# Patient Record
Sex: MALE | Race: WHITE | URBAN - METROPOLITAN AREA
[De-identification: names, ages, dates, MRNs, and addresses within clinical notes are randomized per-mention and may not be internally consistent; named-entity substitution may affect disease eponyms.]

---

## 2023-04-26 ENCOUNTER — OFFICE (OUTPATIENT)
Dept: URBAN - METROPOLITAN AREA CLINIC 112 | Facility: CLINIC | Age: 83
Setting detail: OPHTHALMOLOGY
End: 2023-04-26
Payer: MEDICARE

## 2023-04-26 DIAGNOSIS — H35.3132: ICD-10-CM

## 2023-04-26 DIAGNOSIS — Z96.1: ICD-10-CM

## 2023-04-26 DIAGNOSIS — H26.493: ICD-10-CM

## 2023-04-26 PROCEDURE — 92014 COMPRE OPH EXAM EST PT 1/>: CPT | Performed by: OPHTHALMOLOGY

## 2023-04-26 PROCEDURE — 92134 CPTRZ OPH DX IMG PST SGM RTA: CPT | Performed by: OPHTHALMOLOGY

## 2023-04-26 ASSESSMENT — REFRACTION_AUTOREFRACTION
OS_SPHERE: UTP
OD_AXIS: 086
OD_CYLINDER: -1.50
OD_SPHERE: +0.25

## 2023-04-26 ASSESSMENT — KERATOMETRY
OS_K1POWER_DIOPTERS: 43.75
OD_AXISANGLE_DEGREES: 121
OD_K1POWER_DIOPTERS: 44.25
METHOD_AUTO_MANUAL: AUTO
OS_K2POWER_DIOPTERS: 44.75
OS_AXISANGLE_DEGREES: 160
OD_K2POWER_DIOPTERS: 45.00

## 2023-04-26 ASSESSMENT — REFRACTION_CURRENTRX
OD_AXIS: 41
OS_OVR_VA: 20/
OD_CYLINDER: -0.25
OD_SPHERE: +2.25
OD_SPHERE: +4.25
OS_SPHERE: +4.25
OS_SPHERE: +2.25
OS_OVR_VA: 20/
OD_OVR_VA: 20/
OD_OVR_VA: 20/

## 2023-04-26 ASSESSMENT — VISUAL ACUITY
OD_BCVA: 20/150
OS_BCVA: 20/40

## 2023-04-26 ASSESSMENT — REFRACTION_MANIFEST
OD_ADD: +2.75
OS_SPHERE: -0.50
OS_AXIS: 80
OD_AXIS: 80
OD_SPHERE: PL
OS_VA1: 20/20
OS_CYLINDER: -0.50
OD_VA1: 20/20
OS_ADD: +2.75
OD_CYLINDER: -0.50

## 2023-04-26 ASSESSMENT — TONOMETRY
OS_IOP_MMHG: 16
OD_IOP_MMHG: 21

## 2023-04-26 ASSESSMENT — CONFRONTATIONAL VISUAL FIELD TEST (CVF)
OS_FINDINGS: FULL
OD_FINDINGS: FULL

## 2023-04-26 ASSESSMENT — SPHEQUIV_DERIVED
OS_SPHEQUIV: -0.75
OD_SPHEQUIV: -0.5

## 2023-04-26 ASSESSMENT — AXIALLENGTH_DERIVED
OD_AL: 23.3755
OS_AL: 23.6086

## 2023-05-15 ENCOUNTER — ASC (OUTPATIENT)
Dept: URBAN - METROPOLITAN AREA SURGERY 8 | Facility: SURGERY | Age: 83
Setting detail: OPHTHALMOLOGY
End: 2023-05-15
Payer: MEDICARE

## 2023-05-15 DIAGNOSIS — H26.492: ICD-10-CM

## 2023-05-15 PROBLEM — H26.491 PSC, OBSCURING VISION; RIGHT EYE, LEFT EYE: Status: ACTIVE | Noted: 2023-05-15

## 2023-05-15 PROCEDURE — 66821 AFTER CATARACT LASER SURGERY: CPT | Performed by: OPHTHALMOLOGY

## 2023-05-15 ASSESSMENT — REFRACTION_MANIFEST
OS_ADD: +2.75
OS_SPHERE: -0.50
OS_CYLINDER: -0.50
OD_AXIS: 80
OD_ADD: +2.75
OD_SPHERE: PL
OS_AXIS: 80
OD_VA1: 20/20
OD_CYLINDER: -0.50
OS_VA1: 20/20

## 2023-05-15 ASSESSMENT — KERATOMETRY
OS_K2POWER_DIOPTERS: 44.75
OD_K2POWER_DIOPTERS: 45.00
OD_K1POWER_DIOPTERS: 44.25
METHOD_AUTO_MANUAL: AUTO
OS_K1POWER_DIOPTERS: 43.75
OS_AXISANGLE_DEGREES: 160
OD_AXISANGLE_DEGREES: 121

## 2023-05-15 ASSESSMENT — REFRACTION_CURRENTRX
OD_CYLINDER: -0.25
OD_SPHERE: +2.25
OD_OVR_VA: 20/
OD_SPHERE: +4.25
OD_OVR_VA: 20/
OS_OVR_VA: 20/
OS_OVR_VA: 20/
OD_AXIS: 41
OS_SPHERE: +2.25
OS_SPHERE: +4.25

## 2023-05-15 ASSESSMENT — AXIALLENGTH_DERIVED
OD_AL: 23.3755
OS_AL: 23.6086

## 2023-05-15 ASSESSMENT — REFRACTION_AUTOREFRACTION
OD_CYLINDER: -1.50
OD_AXIS: 086
OD_SPHERE: +0.25
OS_SPHERE: UTP

## 2023-05-15 ASSESSMENT — SPHEQUIV_DERIVED
OD_SPHEQUIV: -0.5
OS_SPHEQUIV: -0.75

## 2023-05-15 ASSESSMENT — VISUAL ACUITY
OS_BCVA: 20/40
OD_BCVA: 20/150

## 2023-06-19 ENCOUNTER — OFFICE (OUTPATIENT)
Dept: URBAN - METROPOLITAN AREA CLINIC 94 | Facility: CLINIC | Age: 83
Setting detail: OPHTHALMOLOGY
End: 2023-06-19
Payer: MEDICARE

## 2023-06-19 DIAGNOSIS — Z96.1: ICD-10-CM

## 2023-06-19 DIAGNOSIS — H35.3132: ICD-10-CM

## 2023-06-19 DIAGNOSIS — H26.491: ICD-10-CM

## 2023-06-19 DIAGNOSIS — H26.492: ICD-10-CM

## 2023-06-19 PROCEDURE — 99024 POSTOP FOLLOW-UP VISIT: CPT | Performed by: OPHTHALMOLOGY

## 2023-06-19 ASSESSMENT — REFRACTION_CURRENTRX
OD_SPHERE: +4.25
OD_OVR_VA: 20/
OS_OVR_VA: 20/
OS_SPHERE: +4.25
OS_SPHERE: +2.25
OS_OVR_VA: 20/
OD_SPHERE: +2.25
OD_CYLINDER: -0.25
OD_AXIS: 41
OD_OVR_VA: 20/

## 2023-06-19 ASSESSMENT — KERATOMETRY
OS_K1POWER_DIOPTERS: 44.00
OS_K2POWER_DIOPTERS: 44.75
OD_K1POWER_DIOPTERS: 44.50
OD_AXISANGLE_DEGREES: 162
OD_K2POWER_DIOPTERS: 45.00
OS_AXISANGLE_DEGREES: 005
METHOD_AUTO_MANUAL: AUTO

## 2023-06-19 ASSESSMENT — AXIALLENGTH_DERIVED
OS_AL: 23.6114
OS_AL: 23.5143

## 2023-06-19 ASSESSMENT — REFRACTION_MANIFEST
OS_VA1: 20/50-
OD_VA1: 20/40-
OS_CYLINDER: -0.75
OD_ADD: +3.25
OS_ADD: +3.25
OD_AXIS: 90
OS_SPHERE: -0.25
OD_CYLINDER: -0.75
OS_AXIS: 85
OD_SPHERE: PL

## 2023-06-19 ASSESSMENT — TONOMETRY
OD_IOP_MMHG: 16
OS_IOP_MMHG: 13

## 2023-06-19 ASSESSMENT — VISUAL ACUITY
OS_BCVA: 20/40-2
OD_BCVA: 20/70-2

## 2023-06-19 ASSESSMENT — SPHEQUIV_DERIVED
OS_SPHEQUIV: -0.625
OS_SPHEQUIV: -0.875

## 2023-06-19 ASSESSMENT — REFRACTION_AUTOREFRACTION
OD_AXIS: 088
OS_AXIS: 084
OS_CYLINDER: -1.25
OD_CYLINDER: -1.25
OS_SPHERE: -0.25
OD_SPHERE: PLANO

## 2023-11-28 ENCOUNTER — EMERGENCY (EMERGENCY)
Facility: HOSPITAL | Age: 83
LOS: 1 days | Discharge: HOME | End: 2023-11-28
Attending: EMERGENCY MEDICINE
Payer: MEDICARE

## 2023-11-28 VITALS
DIASTOLIC BLOOD PRESSURE: 90 MMHG | WEIGHT: 166.01 LBS | OXYGEN SATURATION: 96 % | HEART RATE: 77 BPM | TEMPERATURE: 98 F | RESPIRATION RATE: 18 BRPM | SYSTOLIC BLOOD PRESSURE: 128 MMHG

## 2023-11-28 LAB
ALBUMIN SERPL ELPH-MCNC: 3.8 G/DL — SIGNIFICANT CHANGE UP (ref 3.3–5.2)
ALBUMIN SERPL ELPH-MCNC: 3.8 G/DL — SIGNIFICANT CHANGE UP (ref 3.3–5.2)
ALP SERPL-CCNC: 166 U/L — HIGH (ref 40–120)
ALP SERPL-CCNC: 166 U/L — HIGH (ref 40–120)
ALT FLD-CCNC: 572 U/L — HIGH
ALT FLD-CCNC: 572 U/L — HIGH
ANION GAP SERPL CALC-SCNC: 14 MMOL/L — SIGNIFICANT CHANGE UP (ref 5–17)
ANION GAP SERPL CALC-SCNC: 14 MMOL/L — SIGNIFICANT CHANGE UP (ref 5–17)
AST SERPL-CCNC: 771 U/L — HIGH
AST SERPL-CCNC: 771 U/L — HIGH
BASOPHILS # BLD AUTO: 0.14 K/UL — SIGNIFICANT CHANGE UP (ref 0–0.2)
BASOPHILS # BLD AUTO: 0.14 K/UL — SIGNIFICANT CHANGE UP (ref 0–0.2)
BASOPHILS NFR BLD AUTO: 1.1 % — SIGNIFICANT CHANGE UP (ref 0–2)
BASOPHILS NFR BLD AUTO: 1.1 % — SIGNIFICANT CHANGE UP (ref 0–2)
BILIRUB SERPL-MCNC: 0.6 MG/DL — SIGNIFICANT CHANGE UP (ref 0.4–2)
BILIRUB SERPL-MCNC: 0.6 MG/DL — SIGNIFICANT CHANGE UP (ref 0.4–2)
BUN SERPL-MCNC: 16.8 MG/DL — SIGNIFICANT CHANGE UP (ref 8–20)
BUN SERPL-MCNC: 16.8 MG/DL — SIGNIFICANT CHANGE UP (ref 8–20)
CALCIUM SERPL-MCNC: 9.4 MG/DL — SIGNIFICANT CHANGE UP (ref 8.4–10.5)
CALCIUM SERPL-MCNC: 9.4 MG/DL — SIGNIFICANT CHANGE UP (ref 8.4–10.5)
CHLORIDE SERPL-SCNC: 101 MMOL/L — SIGNIFICANT CHANGE UP (ref 96–108)
CHLORIDE SERPL-SCNC: 101 MMOL/L — SIGNIFICANT CHANGE UP (ref 96–108)
CK MB CFR SERPL CALC: 264.4 NG/ML — HIGH (ref 0–6.7)
CK MB CFR SERPL CALC: 264.4 NG/ML — HIGH (ref 0–6.7)
CK SERPL-CCNC: CRITICAL HIGH U/L (ref 30–200)
CK SERPL-CCNC: CRITICAL HIGH U/L (ref 30–200)
CO2 SERPL-SCNC: 22 MMOL/L — SIGNIFICANT CHANGE UP (ref 22–29)
CO2 SERPL-SCNC: 22 MMOL/L — SIGNIFICANT CHANGE UP (ref 22–29)
CREAT SERPL-MCNC: 0.62 MG/DL — SIGNIFICANT CHANGE UP (ref 0.5–1.3)
CREAT SERPL-MCNC: 0.62 MG/DL — SIGNIFICANT CHANGE UP (ref 0.5–1.3)
CRP SERPL-MCNC: 7 MG/L — HIGH
CRP SERPL-MCNC: 7 MG/L — HIGH
EGFR: 95 ML/MIN/1.73M2 — SIGNIFICANT CHANGE UP
EGFR: 95 ML/MIN/1.73M2 — SIGNIFICANT CHANGE UP
EOSINOPHIL # BLD AUTO: 0.26 K/UL — SIGNIFICANT CHANGE UP (ref 0–0.5)
EOSINOPHIL # BLD AUTO: 0.26 K/UL — SIGNIFICANT CHANGE UP (ref 0–0.5)
EOSINOPHIL NFR BLD AUTO: 2.1 % — SIGNIFICANT CHANGE UP (ref 0–6)
EOSINOPHIL NFR BLD AUTO: 2.1 % — SIGNIFICANT CHANGE UP (ref 0–6)
ERYTHROCYTE [SEDIMENTATION RATE] IN BLOOD: 56 MM/HR — HIGH (ref 0–15)
ERYTHROCYTE [SEDIMENTATION RATE] IN BLOOD: 56 MM/HR — HIGH (ref 0–15)
GLUCOSE SERPL-MCNC: 93 MG/DL — SIGNIFICANT CHANGE UP (ref 70–99)
GLUCOSE SERPL-MCNC: 93 MG/DL — SIGNIFICANT CHANGE UP (ref 70–99)
HCT VFR BLD CALC: 38.1 % — LOW (ref 39–50)
HCT VFR BLD CALC: 38.1 % — LOW (ref 39–50)
HGB BLD-MCNC: 13.1 G/DL — SIGNIFICANT CHANGE UP (ref 13–17)
HGB BLD-MCNC: 13.1 G/DL — SIGNIFICANT CHANGE UP (ref 13–17)
IMM GRANULOCYTES NFR BLD AUTO: 0.5 % — SIGNIFICANT CHANGE UP (ref 0–0.9)
IMM GRANULOCYTES NFR BLD AUTO: 0.5 % — SIGNIFICANT CHANGE UP (ref 0–0.9)
LYMPHOCYTES # BLD AUTO: 17 % — SIGNIFICANT CHANGE UP (ref 13–44)
LYMPHOCYTES # BLD AUTO: 17 % — SIGNIFICANT CHANGE UP (ref 13–44)
LYMPHOCYTES # BLD AUTO: 2.08 K/UL — SIGNIFICANT CHANGE UP (ref 1–3.3)
LYMPHOCYTES # BLD AUTO: 2.08 K/UL — SIGNIFICANT CHANGE UP (ref 1–3.3)
MCHC RBC-ENTMCNC: 31.8 PG — SIGNIFICANT CHANGE UP (ref 27–34)
MCHC RBC-ENTMCNC: 31.8 PG — SIGNIFICANT CHANGE UP (ref 27–34)
MCHC RBC-ENTMCNC: 34.4 GM/DL — SIGNIFICANT CHANGE UP (ref 32–36)
MCHC RBC-ENTMCNC: 34.4 GM/DL — SIGNIFICANT CHANGE UP (ref 32–36)
MCV RBC AUTO: 92.5 FL — SIGNIFICANT CHANGE UP (ref 80–100)
MCV RBC AUTO: 92.5 FL — SIGNIFICANT CHANGE UP (ref 80–100)
MONOCYTES # BLD AUTO: 1.06 K/UL — HIGH (ref 0–0.9)
MONOCYTES # BLD AUTO: 1.06 K/UL — HIGH (ref 0–0.9)
MONOCYTES NFR BLD AUTO: 8.7 % — SIGNIFICANT CHANGE UP (ref 2–14)
MONOCYTES NFR BLD AUTO: 8.7 % — SIGNIFICANT CHANGE UP (ref 2–14)
NEUTROPHILS # BLD AUTO: 8.65 K/UL — HIGH (ref 1.8–7.4)
NEUTROPHILS # BLD AUTO: 8.65 K/UL — HIGH (ref 1.8–7.4)
NEUTROPHILS NFR BLD AUTO: 70.6 % — SIGNIFICANT CHANGE UP (ref 43–77)
NEUTROPHILS NFR BLD AUTO: 70.6 % — SIGNIFICANT CHANGE UP (ref 43–77)
PLATELET # BLD AUTO: 416 K/UL — HIGH (ref 150–400)
PLATELET # BLD AUTO: 416 K/UL — HIGH (ref 150–400)
POTASSIUM SERPL-MCNC: 4.7 MMOL/L — SIGNIFICANT CHANGE UP (ref 3.5–5.3)
POTASSIUM SERPL-MCNC: 4.7 MMOL/L — SIGNIFICANT CHANGE UP (ref 3.5–5.3)
POTASSIUM SERPL-SCNC: 4.7 MMOL/L — SIGNIFICANT CHANGE UP (ref 3.5–5.3)
POTASSIUM SERPL-SCNC: 4.7 MMOL/L — SIGNIFICANT CHANGE UP (ref 3.5–5.3)
PROT SERPL-MCNC: 7.2 G/DL — SIGNIFICANT CHANGE UP (ref 6.6–8.7)
PROT SERPL-MCNC: 7.2 G/DL — SIGNIFICANT CHANGE UP (ref 6.6–8.7)
RBC # BLD: 4.12 M/UL — LOW (ref 4.2–5.8)
RBC # BLD: 4.12 M/UL — LOW (ref 4.2–5.8)
RBC # FLD: 13.8 % — SIGNIFICANT CHANGE UP (ref 10.3–14.5)
RBC # FLD: 13.8 % — SIGNIFICANT CHANGE UP (ref 10.3–14.5)
SODIUM SERPL-SCNC: 137 MMOL/L — SIGNIFICANT CHANGE UP (ref 135–145)
SODIUM SERPL-SCNC: 137 MMOL/L — SIGNIFICANT CHANGE UP (ref 135–145)
WBC # BLD: 12.25 K/UL — HIGH (ref 3.8–10.5)
WBC # BLD: 12.25 K/UL — HIGH (ref 3.8–10.5)
WBC # FLD AUTO: 12.25 K/UL — HIGH (ref 3.8–10.5)
WBC # FLD AUTO: 12.25 K/UL — HIGH (ref 3.8–10.5)

## 2023-11-28 PROCEDURE — 73030 X-RAY EXAM OF SHOULDER: CPT | Mod: 26,50

## 2023-11-28 PROCEDURE — 86140 C-REACTIVE PROTEIN: CPT

## 2023-11-28 PROCEDURE — 84443 ASSAY THYROID STIM HORMONE: CPT

## 2023-11-28 PROCEDURE — 84436 ASSAY OF TOTAL THYROXINE: CPT

## 2023-11-28 PROCEDURE — 99284 EMERGENCY DEPT VISIT MOD MDM: CPT

## 2023-11-28 PROCEDURE — 99283 EMERGENCY DEPT VISIT LOW MDM: CPT

## 2023-11-28 PROCEDURE — 82550 ASSAY OF CK (CPK): CPT

## 2023-11-28 PROCEDURE — 36415 COLL VENOUS BLD VENIPUNCTURE: CPT

## 2023-11-28 PROCEDURE — 80053 COMPREHEN METABOLIC PANEL: CPT

## 2023-11-28 PROCEDURE — 85652 RBC SED RATE AUTOMATED: CPT

## 2023-11-28 PROCEDURE — 73030 X-RAY EXAM OF SHOULDER: CPT

## 2023-11-28 PROCEDURE — 82553 CREATINE MB FRACTION: CPT

## 2023-11-28 PROCEDURE — 85025 COMPLETE CBC W/AUTO DIFF WBC: CPT

## 2023-11-28 RX ORDER — SODIUM CHLORIDE 9 MG/ML
1000 INJECTION INTRAMUSCULAR; INTRAVENOUS; SUBCUTANEOUS ONCE
Refills: 0 | Status: COMPLETED | OUTPATIENT
Start: 2023-11-28 | End: 2023-11-28

## 2023-11-28 RX ADMIN — SODIUM CHLORIDE 1000 MILLILITER(S): 9 INJECTION INTRAMUSCULAR; INTRAVENOUS; SUBCUTANEOUS at 13:31

## 2023-11-28 NOTE — ED PROVIDER NOTE - NS ED ROS FT
Review of Systems:  	•	CONSTITUTIONAL - no fever or chills. No weight loss  	•	EYES - no eye pain, no blurred vision  	•	ENT - no sore throat, no rhinorrhea   	•	RESPIRATORY - no shortness of breath, no cough  	•	CARDIAC - no chest pain, no palpitations  	•	GI - no abd pain, no nausea, no vomiting, no diarrhea, no constipation, no bleeding  	•	GENITO-URINARY - No dysuria, frequency, or hematuria  	•	MUSCULOSKELETAL - +WEAKNESS  	•	NEUROLOGIC - no headache, no gait abnormality, no LOC  	•	SKIN - No abrasions, no lacerations, no rashes  	•	PSYCH - no anxiety, non suicidal, non homicidal, no hallucination, no depression

## 2023-11-28 NOTE — ED PROVIDER NOTE - DATE/TIME 1
Set to eprescribe pending your approval    LAST SEEN: 1/19/21    NEXT APPOINTMENT: NONE 28-Nov-2023 15:00

## 2023-11-28 NOTE — ED PROVIDER NOTE - CLINICAL SUMMARY MEDICAL DECISION MAKING FREE TEXT BOX
83-year-old male patient presents the ED complaining of weakness.  Patient neurologically intact.  Proximal muscle weakness in his shoulder and legs.  Will workup for statin-induced myopathy,  myositis, thyroid induced myopathy. 83-year-old male patient presents the ED complaining of weakness.  Patient neurologically intact.  Proximal muscle weakness in his shoulder and legs.  Will workup for statin-induced myopathy,  myositis, thyroid induced myopathy.    The pt is clinically sober, AA&Ox3, free from distracting injury.  Throughout our interactions in the ED today, the pt has demonstrated concrete thinking/reasoning, has maintained an orderly/reasonable conversation, appears to have intact insight/judgment/reason and therefore in our opinion has capacity to make decisions.  Given the pt’s presentation, we communicated our concern for myositis, possible rhabdomyolysis in laymans terms.    The pt verbalized an understanding of our worries. We’ve told the patient that the ED evaluation is incomplete & many troublesome conditions haven’t been r/o. We have discussed the need for further ED w/u so we can get more information about rhabdomyolysis.  We have discussed the range of possible dx, potential testing & tx options.  We’ve made  numerous efforts to prevent the pt from leaving AMA.  Our discussions included the potential outcomes of leaving AMA, including worsening of their condition, becoming permanently disabled/in pain/critically ill, or death.  Despite these efforts, we were unable to convince the pt to stay.  The pt is refusing any  further care and is leaving against medical advice. We have attempted to offer tx/rx/guidance for any dangerous conditions which are most likely and/or dangerous.  We have answered all questions and have implored the pt to return ASAP to complete the w/u.  A staff member witnessed the patient consenting to AMA.

## 2023-11-28 NOTE — ED PROVIDER NOTE - DIFFERENTIAL DIAGNOSIS
Differential Diagnosis Statin induced myopathy versus myositis versus GBS versus other neurological cause of weakness  Versus thyroid induced myopathy

## 2023-11-28 NOTE — ED PROVIDER NOTE - PATIENT PORTAL LINK FT
You can access the FollowMyHealth Patient Portal offered by [facility] by registering at the following website: [email or web address]. By joining [employer]’s FollowMyHealth portal, you will also be able to view your health information using other applications (apps) compatible with our system.

## 2023-11-28 NOTE — ED PROVIDER NOTE - OBJECTIVE STATEMENT
An 83-year-old male patient with a history of hyperlipidemia, hypertension presents the ED complaining of muscle weakness.  Patient states that over the past 3 days he has been experiencing weakness in his shoulders and his legs.  Patient states that he feels like he is having difficulty lifting his arms over his head secondary to weakness.  Patient also reports feeling like he is weak when he is walking.  He denies any numbness or tingling, vision changes, difficulty ambulating, headache or dizziness.  No fevers at home.  No chest pain or shortness of breath.  No abdominal pain.  Patient is taking Lipitor, states that he stopped taking 1 week ago at the discretion of his primary care doctor.  patient states that he was also prescribed a medication recently for his liver, but is unsure what it was.  No further complaints at this time

## 2023-11-28 NOTE — ED PROVIDER NOTE - NSFOLLOWUPINSTRUCTIONS_ED_ALL_ED_FT
Myopathy      Please follow-up with your primary care doctor tomorrow.  Please discontinue taking your statin medication and the new medication that was prescribed.  Doctor.  Please hydrate well over the next couple days.  Please return to the ED for any worsening symptoms.    Myopathy is a condition that causes muscles to be weak and not work well. There are many different types of myopathies. Myopathies may be passed from parent to child (inherited), or they may be caused by external factors (acquired).    Inherited myopathies may cause symptoms at birth or early in life. Acquired myopathies may start suddenly at any age. There is no cure for most myopathies.    What are the causes?  Common causes of myopathy include:  Endocrine disorders, such as thyroid disease.  Metabolic disorders, which are usually inherited.  Infection or inflammation of the muscles. This is often triggered by viruses or because the body's defense system (immune system) is attacking the muscles.  Certain medicines, such as cholesterol-lowering medicines.  In some cases, the cause is not known.    What increases the risk?  You are more likely to develop this condition if you:  Have a family history of myopathy.  Take medicines that lower cholesterol (statins).  What are the signs or symptoms?  Symptoms of myopathy can range from mild to severe. Common symptoms of this condition include:  Muscle weakness.  Cramps.  Stiffness.  Sudden muscle tightening (spasms).  These symptoms are usually felt close to the center of the body (proximal). Depending on the type of myopathy, one muscle group may be more affected than others. In inherited myopathies, symptoms vary among family members.    Other symptoms of myopathy include:  Muscle pain or tenderness.  Muscle weakness that gets progressively worse.  Feeling tired (fatigue).  Heart problems.  Trouble breathing.  Trouble swallowing.  Double vision.  How is this diagnosed?  A person having a blood sample taken from the arm.  This condition is diagnosed based on your medical history and tests, which may include:  Blood tests.  Removal of a small piece of muscle tissue to be tested (biopsy).  Electromyogram (EMG).  MRI.  Electrocardiogram (ECG).  Genetic testing.  How is this treated?  Treatment for this condition depends on the type of myopathy. Treatment may include:  Over-the-counter medicines such as acetaminophen or ibuprofen.  Prescription medicines, such as disease-modifying antirheumatic drugs (DMARDs) or drugs that suppress the immune system.  Physical therapy.  A brace to help stabilize your muscles.  Follow these instructions at home:  If you have a brace:    Wear the brace as told by your health care provider. Remove it only as told by your health care provider.  Check the skin around the brace every day. Tell your health care provider about any concerns.  Loosen the brace if any part of your body tingles, becomes numb, or turns cold and blue.  Keep the brace clean.  If the brace is not waterproof:  Do not let it get wet.  Cover it with a watertight covering when you take a bath or shower.  Ask your health care provider when it is safe to drive if you are wearing a brace.  General instructions    Take over-the-counter and prescription medicines only as told by your health care provider.  Maintain a healthy weight. Follow instructions from your health care provider about eating, drinking, and physical activity.  If physical therapy is prescribed, do exercises as told by your health care provider or physical therapist.  Keep all follow-up visits. This is important.  Contact a health care provider if:  You have trouble managing your symptoms at home.  You have a fever.  Get help right away if:  You have breathing problems.  You have chest pain.  These symptoms may be an emergency. Get help right away. Call 911.  Do not wait to see if the symptoms will go away.  Do not drive yourself to the hospital.  Summary  Myopathy is a condition that causes muscles to be weak and not work well.  There is no cure for most myopathies.  This condition may be treated with medicines, physical therapy, and a brace to help stabilize your muscles.  This information is not intended to replace advice given to you by your health care provider. Make sure you discuss any questions you have with your health care provider.

## 2023-11-28 NOTE — ED PROVIDER NOTE - ATTENDING CONTRIBUTION TO CARE
I, Marta Mancera, have personally seen and examined this patient. I have fully participated in the care of this patient. I have reviewed all pertinent clinical information, including history, physical exam, plan and the Resident's note and agree except as noted below.     Patient with progressive weakness initially of the upper extremities and then also slightly the lower extremities notes unable to lift his arms past shoulder height has difficulty going up stairs but able to walk.  Bilateral.  No headache no vision changes.   No tenderness  to proximal muscles.  On exam has 5/5 strength with gait intact, some difficulty getting from sit to stand. No sensory deficits.    labs noted for rhabdo, normal renal function.   Splane to patient concern for myositis may be progressive could become more weak and puts him at risk of falls.  Also concern he could have progression of his rhabdomyolysis which can cause kidney failure resulting in electrolyte derangements cardiac arrhythmias permanent disability and death.  Explained to patient he would benefit from admission for IV hydration and continued monitoring and further workup.  Patient declines extensive discussion patient leaving AGAINST MEDICAL ADVICE

## 2023-11-28 NOTE — ED PROVIDER NOTE - PHYSICAL EXAMINATION
Const: Pt is well appearing. Awake, alert and oriented to person, place, & time. In no acute distress.   HEENT: NC/AT. Moist mucous membranes.  Eyes: PERRLA. No scleral icterus. EOMI.  Neck:. Soft and supple. Full ROM without pain. No cervical midline tenderness.   Cardiac: Regular rate and regular rhythm. +S1/S2. Peripheral pulses 2+ and symmetric. No LE edema.  Resp: Speaking in full sentences, breath sounds equal and clear bilaterally. No wheezes, rales or rhonchi.  Abd: Soft, non-tender, non-distended. Normal bowel sounds in all 4 quadrants. No guarding or rebound.  MSK: Patient has difficulty raising arms above his head secondary to what he says is weakness.  Patient has full strength in , flexion extension of the arms.  Skin: No rashes, abrasions or lacerations.  Lymph: No cervical lymphadenopathy.  Neuro: A&Ox3, moving all extremities symmetrically, follows commands, motor maria de jesus upper and lower ext 5/5, sensory symm and intact CN 2-12 grossly intact, no ataxia, no nystagmus, no dysmetria, no ddk, symm maria de jesus, no pronator drift

## 2023-11-28 NOTE — ED ADULT NURSE NOTE - OBJECTIVE STATEMENT
Pt c/o weakness for the past week. States last week he woke up and could not raise his arms above his head. Says it has gotten better since then but still feels stiffness in the legs and arms. Denies fevers, chills, cp, N/V/D. Denies falls/LOC.

## 2023-11-28 NOTE — ED PROVIDER NOTE - PROGRESS NOTE DETAILS
Discussed with patient results, extreme elevated creatinine kinase, elevated liver enzymes likely secondary to his medications.  Discussed with patient need to stay inpatient for IV fluids, renal function monitoring, concern for rhabdo.  Patient is adamant that he does not want to stay in the hospital, states that he will go home and follow-up with his primary care doctor.  I discussed with patient the risk of going home including falls, acute kidney failure, worsening weakness, and even death.  Patient states that he understands all these risks and still wishes to go home.  Discussed with patient that he should follow-up with his primary care doctor and return to the ED for any worsening symptoms, hydrate well at home.  Discontinue his medication until he follows up with his primary care doctor.  Patient understands risk and agrees to sign out AMA.

## 2023-12-02 ENCOUNTER — INPATIENT (INPATIENT)
Facility: HOSPITAL | Age: 83
LOS: 9 days | Discharge: HOME | DRG: 501 | End: 2023-12-12
Attending: STUDENT IN AN ORGANIZED HEALTH CARE EDUCATION/TRAINING PROGRAM | Admitting: HOSPITALIST
Payer: MEDICARE

## 2023-12-02 VITALS
DIASTOLIC BLOOD PRESSURE: 73 MMHG | SYSTOLIC BLOOD PRESSURE: 141 MMHG | TEMPERATURE: 98 F | RESPIRATION RATE: 22 BRPM | WEIGHT: 169.76 LBS | HEART RATE: 90 BPM | OXYGEN SATURATION: 96 % | HEIGHT: 66 IN

## 2023-12-02 DIAGNOSIS — R74.8 ABNORMAL LEVELS OF OTHER SERUM ENZYMES: ICD-10-CM

## 2023-12-02 LAB
24R-OH-CALCIDIOL SERPL-MCNC: 32.2 NG/ML — SIGNIFICANT CHANGE UP (ref 30–80)
24R-OH-CALCIDIOL SERPL-MCNC: 32.2 NG/ML — SIGNIFICANT CHANGE UP (ref 30–80)
ALBUMIN SERPL ELPH-MCNC: 3.4 G/DL — SIGNIFICANT CHANGE UP (ref 3.3–5.2)
ALBUMIN SERPL ELPH-MCNC: 3.4 G/DL — SIGNIFICANT CHANGE UP (ref 3.3–5.2)
ALP SERPL-CCNC: 157 U/L — HIGH (ref 40–120)
ALP SERPL-CCNC: 157 U/L — HIGH (ref 40–120)
ALT FLD-CCNC: 637 U/L — HIGH
ALT FLD-CCNC: 637 U/L — HIGH
ANION GAP SERPL CALC-SCNC: 13 MMOL/L — SIGNIFICANT CHANGE UP (ref 5–17)
ANION GAP SERPL CALC-SCNC: 13 MMOL/L — SIGNIFICANT CHANGE UP (ref 5–17)
APPEARANCE UR: ABNORMAL
APPEARANCE UR: ABNORMAL
AST SERPL-CCNC: 761 U/L — HIGH
AST SERPL-CCNC: 761 U/L — HIGH
BACTERIA # UR AUTO: NEGATIVE /HPF — SIGNIFICANT CHANGE UP
BACTERIA # UR AUTO: NEGATIVE /HPF — SIGNIFICANT CHANGE UP
BASOPHILS # BLD AUTO: 0.11 K/UL — SIGNIFICANT CHANGE UP (ref 0–0.2)
BASOPHILS # BLD AUTO: 0.11 K/UL — SIGNIFICANT CHANGE UP (ref 0–0.2)
BASOPHILS NFR BLD AUTO: 0.9 % — SIGNIFICANT CHANGE UP (ref 0–2)
BASOPHILS NFR BLD AUTO: 0.9 % — SIGNIFICANT CHANGE UP (ref 0–2)
BILIRUB SERPL-MCNC: 0.5 MG/DL — SIGNIFICANT CHANGE UP (ref 0.4–2)
BILIRUB SERPL-MCNC: 0.5 MG/DL — SIGNIFICANT CHANGE UP (ref 0.4–2)
BILIRUB UR-MCNC: ABNORMAL
BILIRUB UR-MCNC: ABNORMAL
BUN SERPL-MCNC: 15.7 MG/DL — SIGNIFICANT CHANGE UP (ref 8–20)
BUN SERPL-MCNC: 15.7 MG/DL — SIGNIFICANT CHANGE UP (ref 8–20)
CALCIUM SERPL-MCNC: 8.8 MG/DL — SIGNIFICANT CHANGE UP (ref 8.4–10.5)
CALCIUM SERPL-MCNC: 8.8 MG/DL — SIGNIFICANT CHANGE UP (ref 8.4–10.5)
CALCIUM SERPL-MCNC: 9.3 MG/DL — SIGNIFICANT CHANGE UP (ref 8.4–10.5)
CALCIUM SERPL-MCNC: 9.3 MG/DL — SIGNIFICANT CHANGE UP (ref 8.4–10.5)
CAST: 7 /LPF — HIGH (ref 0–4)
CAST: 7 /LPF — HIGH (ref 0–4)
CHLORIDE SERPL-SCNC: 100 MMOL/L — SIGNIFICANT CHANGE UP (ref 96–108)
CHLORIDE SERPL-SCNC: 100 MMOL/L — SIGNIFICANT CHANGE UP (ref 96–108)
CK MB CFR SERPL CALC: 244.8 NG/ML — HIGH (ref 0–6.7)
CK MB CFR SERPL CALC: 244.8 NG/ML — HIGH (ref 0–6.7)
CK SERPL-CCNC: CRITICAL HIGH U/L (ref 30–200)
CK SERPL-CCNC: CRITICAL HIGH U/L (ref 30–200)
CO2 SERPL-SCNC: 24 MMOL/L — SIGNIFICANT CHANGE UP (ref 22–29)
CO2 SERPL-SCNC: 24 MMOL/L — SIGNIFICANT CHANGE UP (ref 22–29)
COLOR SPEC: SIGNIFICANT CHANGE UP
COLOR SPEC: SIGNIFICANT CHANGE UP
CREAT SERPL-MCNC: 0.52 MG/DL — SIGNIFICANT CHANGE UP (ref 0.5–1.3)
CREAT SERPL-MCNC: 0.52 MG/DL — SIGNIFICANT CHANGE UP (ref 0.5–1.3)
CRP SERPL-MCNC: 7 MG/L — HIGH
CRP SERPL-MCNC: 7 MG/L — HIGH
CRP SERPL-MCNC: 8 MG/L — HIGH
CRP SERPL-MCNC: 8 MG/L — HIGH
DIFF PNL FLD: ABNORMAL
DIFF PNL FLD: ABNORMAL
EGFR: 101 ML/MIN/1.73M2 — SIGNIFICANT CHANGE UP
EGFR: 101 ML/MIN/1.73M2 — SIGNIFICANT CHANGE UP
EOSINOPHIL # BLD AUTO: 0.25 K/UL — SIGNIFICANT CHANGE UP (ref 0–0.5)
EOSINOPHIL # BLD AUTO: 0.25 K/UL — SIGNIFICANT CHANGE UP (ref 0–0.5)
EOSINOPHIL NFR BLD AUTO: 2.1 % — SIGNIFICANT CHANGE UP (ref 0–6)
EOSINOPHIL NFR BLD AUTO: 2.1 % — SIGNIFICANT CHANGE UP (ref 0–6)
ERYTHROCYTE [SEDIMENTATION RATE] IN BLOOD: 34 MM/HR — HIGH (ref 0–15)
ERYTHROCYTE [SEDIMENTATION RATE] IN BLOOD: 34 MM/HR — HIGH (ref 0–15)
ERYTHROCYTE [SEDIMENTATION RATE] IN BLOOD: 53 MM/HR — HIGH (ref 0–15)
ERYTHROCYTE [SEDIMENTATION RATE] IN BLOOD: 53 MM/HR — HIGH (ref 0–15)
FERRITIN SERPL-MCNC: 620 NG/ML — HIGH (ref 30–400)
FERRITIN SERPL-MCNC: 620 NG/ML — HIGH (ref 30–400)
FOLATE SERPL-MCNC: 6.8 NG/ML — SIGNIFICANT CHANGE UP
FOLATE SERPL-MCNC: 6.8 NG/ML — SIGNIFICANT CHANGE UP
GLUCOSE SERPL-MCNC: 92 MG/DL — SIGNIFICANT CHANGE UP (ref 70–99)
GLUCOSE SERPL-MCNC: 92 MG/DL — SIGNIFICANT CHANGE UP (ref 70–99)
GLUCOSE UR QL: NEGATIVE MG/DL — SIGNIFICANT CHANGE UP
GLUCOSE UR QL: NEGATIVE MG/DL — SIGNIFICANT CHANGE UP
HCT VFR BLD CALC: 36.9 % — LOW (ref 39–50)
HCT VFR BLD CALC: 36.9 % — LOW (ref 39–50)
HGB BLD-MCNC: 12.7 G/DL — LOW (ref 13–17)
HGB BLD-MCNC: 12.7 G/DL — LOW (ref 13–17)
IMM GRANULOCYTES NFR BLD AUTO: 0.5 % — SIGNIFICANT CHANGE UP (ref 0–0.9)
IMM GRANULOCYTES NFR BLD AUTO: 0.5 % — SIGNIFICANT CHANGE UP (ref 0–0.9)
IRON SATN MFR SERPL: 13 % — LOW (ref 16–55)
IRON SATN MFR SERPL: 13 % — LOW (ref 16–55)
IRON SATN MFR SERPL: 36 UG/DL — LOW (ref 59–158)
IRON SATN MFR SERPL: 36 UG/DL — LOW (ref 59–158)
KETONES UR-MCNC: NEGATIVE MG/DL — SIGNIFICANT CHANGE UP
KETONES UR-MCNC: NEGATIVE MG/DL — SIGNIFICANT CHANGE UP
LDH SERPL L TO P-CCNC: 1208 U/L — HIGH (ref 98–192)
LDH SERPL L TO P-CCNC: 1208 U/L — HIGH (ref 98–192)
LDH SERPL L TO P-CCNC: 1285 U/L — HIGH (ref 98–192)
LDH SERPL L TO P-CCNC: 1285 U/L — HIGH (ref 98–192)
LEUKOCYTE ESTERASE UR-ACNC: ABNORMAL
LEUKOCYTE ESTERASE UR-ACNC: ABNORMAL
LYMPHOCYTES # BLD AUTO: 1.85 K/UL — SIGNIFICANT CHANGE UP (ref 1–3.3)
LYMPHOCYTES # BLD AUTO: 1.85 K/UL — SIGNIFICANT CHANGE UP (ref 1–3.3)
LYMPHOCYTES # BLD AUTO: 15.3 % — SIGNIFICANT CHANGE UP (ref 13–44)
LYMPHOCYTES # BLD AUTO: 15.3 % — SIGNIFICANT CHANGE UP (ref 13–44)
MCHC RBC-ENTMCNC: 32.1 PG — SIGNIFICANT CHANGE UP (ref 27–34)
MCHC RBC-ENTMCNC: 32.1 PG — SIGNIFICANT CHANGE UP (ref 27–34)
MCHC RBC-ENTMCNC: 34.4 GM/DL — SIGNIFICANT CHANGE UP (ref 32–36)
MCHC RBC-ENTMCNC: 34.4 GM/DL — SIGNIFICANT CHANGE UP (ref 32–36)
MCV RBC AUTO: 93.2 FL — SIGNIFICANT CHANGE UP (ref 80–100)
MCV RBC AUTO: 93.2 FL — SIGNIFICANT CHANGE UP (ref 80–100)
MONOCYTES # BLD AUTO: 1.05 K/UL — HIGH (ref 0–0.9)
MONOCYTES # BLD AUTO: 1.05 K/UL — HIGH (ref 0–0.9)
MONOCYTES NFR BLD AUTO: 8.7 % — SIGNIFICANT CHANGE UP (ref 2–14)
MONOCYTES NFR BLD AUTO: 8.7 % — SIGNIFICANT CHANGE UP (ref 2–14)
NEUTROPHILS # BLD AUTO: 8.8 K/UL — HIGH (ref 1.8–7.4)
NEUTROPHILS # BLD AUTO: 8.8 K/UL — HIGH (ref 1.8–7.4)
NEUTROPHILS NFR BLD AUTO: 72.5 % — SIGNIFICANT CHANGE UP (ref 43–77)
NEUTROPHILS NFR BLD AUTO: 72.5 % — SIGNIFICANT CHANGE UP (ref 43–77)
NITRITE UR-MCNC: NEGATIVE — SIGNIFICANT CHANGE UP
NITRITE UR-MCNC: NEGATIVE — SIGNIFICANT CHANGE UP
NT-PROBNP SERPL-SCNC: 71 PG/ML — SIGNIFICANT CHANGE UP (ref 0–300)
NT-PROBNP SERPL-SCNC: 71 PG/ML — SIGNIFICANT CHANGE UP (ref 0–300)
PH UR: 5.5 — SIGNIFICANT CHANGE UP (ref 5–8)
PH UR: 5.5 — SIGNIFICANT CHANGE UP (ref 5–8)
PLATELET # BLD AUTO: 427 K/UL — HIGH (ref 150–400)
PLATELET # BLD AUTO: 427 K/UL — HIGH (ref 150–400)
POTASSIUM SERPL-MCNC: 4.4 MMOL/L — SIGNIFICANT CHANGE UP (ref 3.5–5.3)
POTASSIUM SERPL-MCNC: 4.4 MMOL/L — SIGNIFICANT CHANGE UP (ref 3.5–5.3)
POTASSIUM SERPL-SCNC: 4.4 MMOL/L — SIGNIFICANT CHANGE UP (ref 3.5–5.3)
POTASSIUM SERPL-SCNC: 4.4 MMOL/L — SIGNIFICANT CHANGE UP (ref 3.5–5.3)
PROT SERPL-MCNC: 6.8 G/DL — SIGNIFICANT CHANGE UP (ref 6.6–8.7)
PROT SERPL-MCNC: 6.8 G/DL — SIGNIFICANT CHANGE UP (ref 6.6–8.7)
PROT UR-MCNC: 100 MG/DL
PROT UR-MCNC: 100 MG/DL
PTH-INTACT FLD-MCNC: 23 PG/ML — SIGNIFICANT CHANGE UP (ref 15–65)
PTH-INTACT FLD-MCNC: 23 PG/ML — SIGNIFICANT CHANGE UP (ref 15–65)
RAPID RVP RESULT: SIGNIFICANT CHANGE UP
RAPID RVP RESULT: SIGNIFICANT CHANGE UP
RBC # BLD: 3.96 M/UL — LOW (ref 4.2–5.8)
RBC # BLD: 3.96 M/UL — LOW (ref 4.2–5.8)
RBC # FLD: 13.9 % — SIGNIFICANT CHANGE UP (ref 10.3–14.5)
RBC # FLD: 13.9 % — SIGNIFICANT CHANGE UP (ref 10.3–14.5)
RBC CASTS # UR COMP ASSIST: 272 /HPF — HIGH (ref 0–4)
RBC CASTS # UR COMP ASSIST: 272 /HPF — HIGH (ref 0–4)
SARS-COV-2 RNA SPEC QL NAA+PROBE: SIGNIFICANT CHANGE UP
SARS-COV-2 RNA SPEC QL NAA+PROBE: SIGNIFICANT CHANGE UP
SODIUM SERPL-SCNC: 137 MMOL/L — SIGNIFICANT CHANGE UP (ref 135–145)
SODIUM SERPL-SCNC: 137 MMOL/L — SIGNIFICANT CHANGE UP (ref 135–145)
SP GR SPEC: 1.03 — SIGNIFICANT CHANGE UP (ref 1–1.03)
SP GR SPEC: 1.03 — SIGNIFICANT CHANGE UP (ref 1–1.03)
SQUAMOUS # UR AUTO: 1 /HPF — SIGNIFICANT CHANGE UP (ref 0–5)
SQUAMOUS # UR AUTO: 1 /HPF — SIGNIFICANT CHANGE UP (ref 0–5)
T3 SERPL-MCNC: 95 NG/DL — SIGNIFICANT CHANGE UP (ref 80–200)
T3 SERPL-MCNC: 95 NG/DL — SIGNIFICANT CHANGE UP (ref 80–200)
T4 AB SER-ACNC: 5.6 UG/DL — SIGNIFICANT CHANGE UP (ref 4.5–12)
T4 AB SER-ACNC: 5.6 UG/DL — SIGNIFICANT CHANGE UP (ref 4.5–12)
TIBC SERPL-MCNC: 276 UG/DL — SIGNIFICANT CHANGE UP (ref 220–430)
TIBC SERPL-MCNC: 276 UG/DL — SIGNIFICANT CHANGE UP (ref 220–430)
TRANSFERRIN SERPL-MCNC: 193 MG/DL — SIGNIFICANT CHANGE UP (ref 180–329)
TRANSFERRIN SERPL-MCNC: 193 MG/DL — SIGNIFICANT CHANGE UP (ref 180–329)
TSH SERPL-MCNC: 1.31 UIU/ML — SIGNIFICANT CHANGE UP (ref 0.27–4.2)
TSH SERPL-MCNC: 1.31 UIU/ML — SIGNIFICANT CHANGE UP (ref 0.27–4.2)
UROBILINOGEN FLD QL: 1 MG/DL — SIGNIFICANT CHANGE UP (ref 0.2–1)
UROBILINOGEN FLD QL: 1 MG/DL — SIGNIFICANT CHANGE UP (ref 0.2–1)
VIT B12 SERPL-MCNC: 340 PG/ML — SIGNIFICANT CHANGE UP (ref 232–1245)
VIT B12 SERPL-MCNC: 340 PG/ML — SIGNIFICANT CHANGE UP (ref 232–1245)
WBC # BLD: 12.12 K/UL — HIGH (ref 3.8–10.5)
WBC # BLD: 12.12 K/UL — HIGH (ref 3.8–10.5)
WBC # FLD AUTO: 12.12 K/UL — HIGH (ref 3.8–10.5)
WBC # FLD AUTO: 12.12 K/UL — HIGH (ref 3.8–10.5)
WBC UR QL: 1 /HPF — SIGNIFICANT CHANGE UP (ref 0–5)
WBC UR QL: 1 /HPF — SIGNIFICANT CHANGE UP (ref 0–5)

## 2023-12-02 PROCEDURE — 99223 1ST HOSP IP/OBS HIGH 75: CPT

## 2023-12-02 PROCEDURE — 93010 ELECTROCARDIOGRAM REPORT: CPT

## 2023-12-02 PROCEDURE — 99497 ADVNCD CARE PLAN 30 MIN: CPT | Mod: 25

## 2023-12-02 PROCEDURE — 99285 EMERGENCY DEPT VISIT HI MDM: CPT

## 2023-12-02 PROCEDURE — 71046 X-RAY EXAM CHEST 2 VIEWS: CPT | Mod: 26

## 2023-12-02 PROCEDURE — 99222 1ST HOSP IP/OBS MODERATE 55: CPT

## 2023-12-02 RX ORDER — PREGABALIN 225 MG/1
1000 CAPSULE ORAL DAILY
Refills: 0 | Status: DISCONTINUED | OUTPATIENT
Start: 2023-12-02 | End: 2023-12-12

## 2023-12-02 RX ORDER — ENOXAPARIN SODIUM 100 MG/ML
40 INJECTION SUBCUTANEOUS EVERY 24 HOURS
Refills: 0 | Status: COMPLETED | OUTPATIENT
Start: 2023-12-02 | End: 2023-12-07

## 2023-12-02 RX ORDER — PANTOPRAZOLE SODIUM 20 MG/1
40 TABLET, DELAYED RELEASE ORAL
Refills: 0 | Status: DISCONTINUED | OUTPATIENT
Start: 2023-12-02 | End: 2023-12-12

## 2023-12-02 RX ORDER — SODIUM CHLORIDE 9 MG/ML
1000 INJECTION INTRAMUSCULAR; INTRAVENOUS; SUBCUTANEOUS ONCE
Refills: 0 | Status: COMPLETED | OUTPATIENT
Start: 2023-12-02 | End: 2023-12-02

## 2023-12-02 RX ORDER — FOLIC ACID 0.8 MG
1 TABLET ORAL DAILY
Refills: 0 | Status: DISCONTINUED | OUTPATIENT
Start: 2023-12-02 | End: 2023-12-12

## 2023-12-02 RX ORDER — SODIUM CHLORIDE 9 MG/ML
1000 INJECTION, SOLUTION INTRAVENOUS
Refills: 0 | Status: DISCONTINUED | OUTPATIENT
Start: 2023-12-02 | End: 2023-12-03

## 2023-12-02 RX ORDER — LISINOPRIL 2.5 MG/1
10 TABLET ORAL DAILY
Refills: 0 | Status: DISCONTINUED | OUTPATIENT
Start: 2023-12-02 | End: 2023-12-03

## 2023-12-02 RX ADMIN — SODIUM CHLORIDE 1000 MILLILITER(S): 9 INJECTION INTRAMUSCULAR; INTRAVENOUS; SUBCUTANEOUS at 12:05

## 2023-12-02 RX ADMIN — SODIUM CHLORIDE 100 MILLILITER(S): 9 INJECTION, SOLUTION INTRAVENOUS at 15:27

## 2023-12-02 RX ADMIN — SODIUM CHLORIDE 1000 MILLILITER(S): 9 INJECTION INTRAMUSCULAR; INTRAVENOUS; SUBCUTANEOUS at 14:37

## 2023-12-02 RX ADMIN — SODIUM CHLORIDE 100 MILLILITER(S): 9 INJECTION, SOLUTION INTRAVENOUS at 20:17

## 2023-12-02 NOTE — ED ADULT NURSE NOTE - NSSEPSISSUSPECTED_ED_A_ED
[FreeTextEntry1] : Impression: Sprain anterior talofibular ligament right ankle\par \par This patient will discontinue the ankle Aircast as she is doing quite well.  She will be allowed to return to gym and playground this coming Monday.  She can go back to school tomorrow.  She will return as necessary. No

## 2023-12-02 NOTE — ED PROVIDER NOTE - CLINICAL SUMMARY MEDICAL DECISION MAKING FREE TEXT BOX
I, Isrrael Valadez MD, personally saw the patient with the resident, and completed the key components of the history and physical exam. I then discussed the management plan with the resident.  Patient with a past medical history of hyperlipidemia previously on a statin which was recently stopped is presenting for generalized muscle weakness and mild pain.  Present for the past week.  Was seen at this hospital and left AGAINST MEDICAL ADVICE after being found to have elevated CK.  Came back today due to persistent symptoms.  Denies any recent falls, fevers.  No recent diarrhea illnesses.  No history of similar episode. Concern for rhabdomyolysis versus a polymyositis versus less likely viral illness causing Guillain-Barré given no objective weakness and he is ambulatory here.  States that he feels tightness when he tries to raise his arms above his shoulders.  Lab work obtained, still with elevated CK.  Fluids given.  Will plan for admission.  Isrrael Valadez MD. I, Isrrael Valadez MD, personally saw the patient with the resident, and completed the key components of the history and physical exam. I then discussed the management plan with the resident.  Patient with a past medical history of hyperlipidemia previously on a statin which was recently stopped is presenting for generalized muscle weakness and mild pain.  Present for the past week.  Was seen at this hospital and left AGAINST MEDICAL ADVICE after being found to have elevated CK.  Came back today due to persistent symptoms.  Denies any recent falls, fevers.  No recent diarrhea illnesses.  No history of similar episode. Concern for rhabdomyolysis versus a polymyositis versus less likely viral illness causing Guillain-Barré given no objective weakness and he is ambulatory here.  States that he feels tightness when he tries to raise his arms above his shoulders.  Lab work obtained, still with elevated CK.  Fluids given.  Will plan for admission.  Isrrael Valdaez MD. Concern for rhabdomyolysis versus a polymyositis versus less likely viral illness causing Guillain-Barré given no objective weakness and he is ambulatory here.  States that he feels tightness when he tries to raise his arms above his shoulders.  Lab work obtained, still with elevated CK.  Fluids given.  Will plan for admission.  Isrrael Valadez MD.

## 2023-12-02 NOTE — ED ADULT TRIAGE NOTE - CHIEF COMPLAINT QUOTE
I was here last week for the same thing. both my shoulders hurt, I have trouble lifting them up, also a little weak in the legs, and SOB

## 2023-12-02 NOTE — ED PROVIDER NOTE - PHYSICAL EXAMINATION
Constitutional: Awake, Alert, non-toxic. No acute distress.  HEAD: Normocephalic, atraumatic.   EYES: PERRL, EOM intact, conjunctiva and sclera are clear bilaterally.  ENT: External ears normal. No rhinorrhea, no tracheal deviation   NECK: Supple, non-tender  CARDIOVASCULAR: regular rate and rhythm.  RESPIRATORY: Normal respiratory effort; breath sounds CTAB, no wheezes, rhonchi, or rales. Speaking in full sentences. No accessory muscle use.   ABDOMEN: Soft; non-tender, non-distended. No rebound or guarding.   MSK:  no lower extremity edema, no deformities  SKIN: Warm, dry  NEURO: A&O x3. Sensory and motor functions are grossly intact though endorses feeling limited raising his arms above his head. able to ambulate. Speech is normal. No facial droop.  PSYCH: Appearance and judgement seem appropriate for gender and age.

## 2023-12-02 NOTE — H&P ADULT - CONVERSATION DETAILS
pt is educated about advance directives, to discuss with his family and inform them about his wishes re advance directives , CPR , intubation   he wishes to get CPR / intubation to be kept alive at present   all questions answered

## 2023-12-02 NOTE — H&P ADULT - DECREASED STRENGTH DETAIL
can not lift both upper ext to or shoulder over 30 % , not able to lift  comb hair over shoulder   RLE not able to lift bended as sitting in the chair   able to get out of chair and stand

## 2023-12-02 NOTE — H&P ADULT - ASSESSMENT
84 yo M with HTN , hyperlipidemia presented with shoulder arm pain , weakness of arms and lower leg high CPK       1- Elevated CPK with muscle weakness ., myalgias   neurology and rheumotology consult pending recommandation   work up  serology myositis work up serology ordered ;  , aldolase , anti Phylicia 1 . BELIA , Anca .,LDH   TSH , hepatitis profile ,   IVF ordered   trend CPK   M1 2 ab , myoglobulin   hold lipitor   check LFTS . blood cx   PT   may need muscle biopsy     2- HTN   on lisinopril     DVT prophylaxis    82 yo M with HTN , hyperlipidemia presented with shoulder arm pain , weakness of arms and lower leg high CPK       1- Elevated CPK with muscle weakness ., myalgias   neurology and rheumotology consult pending recommandation   work up  serology myositis work up serology ordered ;  , aldolase , anti Phylicia 1 . BELIA , Anca .,LDH   TSH , hepatitis profile ,   IVF ordered   trend CPK   M1 2 ab , myoglobulin   hold lipitor   check LFTS . blood cx   PT   may need muscle biopsy     2- HTN   on lisinopril     DVT prophylaxis

## 2023-12-02 NOTE — ED PROVIDER NOTE - ATTENDING CONTRIBUTION TO CARE
I, Isrrael Valadez MD, personally saw the patient with the resident, and completed the key components of the history and physical exam. I then discussed the management plan with the resident.

## 2023-12-02 NOTE — CHART NOTE - NSCHARTNOTEFT_GEN_A_CORE
Received consult from Dr. Moeller and discussed over the phone.    Preliminary rec as follows:  Obtain myositis markers, ACE level, Hep B/C, Quantiferon TB. Agree with other labwork already obtained by primary doctor.  Obtain MRI of most affected proximal limb (e.g. upper arms, thighs). If signs of active inflammatory myositis, patient will need biopsy of affected muscle group by surgery for pathology.  Given severely high CPK, if no concern for active infection, start PDN 60mg/day for now as we continue to watch above results.    Formal note to follow.

## 2023-12-02 NOTE — PATIENT PROFILE ADULT - FALL HARM RISK - UNIVERSAL INTERVENTIONS
Bed in lowest position, wheels locked, appropriate side rails in place/Call bell, personal items and telephone in reach/Instruct patient to call for assistance before getting out of bed or chair/Non-slip footwear when patient is out of bed/[place] to call system/Physically safe environment - no spills, clutter or unnecessary equipment/Purposeful Proactive Rounding/Room/bathroom lighting operational, light cord in reach

## 2023-12-02 NOTE — H&P ADULT - NSHPLABSRESULTS_GEN_ALL_CORE
12.7   12.12 )-----------( 427      ( 02 Dec 2023 12:32 )             36.9     12-02    137  |  100  |  15.7  ----------------------------<  92  4.4   |  24.0  |  0.52    Ca    8.8      02 Dec 2023 12:32    TPro  6.8  /  Alb  3.4  /  TBili  0.5  /  DBili  x   /  AST  761<H>  /  ALT  637<H>  /  AlkPhos  157<H>  12-02  cpkeatine Kinase, Serum (12.02.23 @ 12:32)   Creatine Kinase, Serum: [id number]: TYPE:(C=Critical, N=Notification, A=Abnormal) C   TESTS: CPK   DATE/TIME CALLED: 12/02/2023 13:42:13 EST   CALLED TO: DR. SPRAGUE   READ BACK (2 Patient Identifiers)(Y/N): Y   READ BACK VALUES (Y/N): Y   CALLED BY: JV U/L      Historical Values  Creatine Kinase, Serum (12.02.23 @ 12:32)   Creatine Kinase, Serum: [id number]: TYPE:(C=Critical, N=Notification, A=Abnormal) C   TESTS: CPK   DATE/TIME CALLED: 12/02/2023 13:42:13 EST   CALLED TO: DR. SPRAGUE   READ BACK (2 Patient Identifiers)(Y/N): Y   READ BACK VALUES (Y/N): Y   CALLED BY: JV U/L  Creatine Kinase, Serum (11.28.23 @ 11:14)   Creatine Kinase, Serum: [id number]: TYPE:(C=Critical, N=Notification, A=Abnormal) C 12.7   12.12 )-----------( 427      ( 02 Dec 2023 12:32 )             36.9     12-02    137  |  100  |  15.7  ----------------------------<  92  4.4   |  24.0  |  0.52    Ca    8.8      02 Dec 2023 12:32    TPro  6.8  /  Alb  3.4  /  TBili  0.5  /  DBili  x   /  AST  761<H>  /  ALT  637<H>  /  AlkPhos  157<H>  12-02  cpkeatine Kinase, Serum (12.02.23 @ 12:32)   Creatine Kinase, Serum: [id number]: TYPE:(C=Critical, N=Notification, A=Abnormal) C   TESTS: CPK   DATE/TIME CALLED: 12/02/2023 13:42:13 EST   CALLED TO: DR. SPRAGUE   READ BACK (2 Patient Identifiers)(Y/N): Y   READ BACK VALUES (Y/N): Y   CALLED BY: JV U/L      Historical Values  Creatine Kinase, Serum (12.02.23 @ 12:32)   Creatine Kinase, Serum: [id number]: TYPE:(C=Critical, N=Notification, A=Abnormal) C   TESTS: CPK   DATE/TIME CALLED: 12/02/2023 13:42:13 EST   CALLED TO: DR. SPRAGUE   READ BACK (2 Patient Identifiers)(Y/N): Y   READ BACK VALUES (Y/N): Y   CALLED BY: JV U/L  Creatine Kinase, Serum (11.28.23 @ 11:14)   Creatine Kinase, Serum: [id number]: TYPE:(C=Critical, N=Notification, A=Abnormal) C

## 2023-12-02 NOTE — H&P ADULT - NSHPPHYSICALEXAM_GEN_ALL_CORE
Vital Signs Last 24 Hrs  T(C): 36.6 (02 Dec 2023 10:38), Max: 36.6 (02 Dec 2023 10:38)  T(F): 97.9 (02 Dec 2023 10:38), Max: 97.9 (02 Dec 2023 10:38)  HR: 90 (02 Dec 2023 10:38) (90 - 90)  BP: 141/73 (02 Dec 2023 10:38) (141/73 - 141/73)  BP(mean): --  RR: 22 (02 Dec 2023 10:38) (22 - 22)  SpO2: 96% (02 Dec 2023 10:38) (96% - 96%)    Parameters below as of 02 Dec 2023 10:38  Patient On (Oxygen Delivery Method): room air

## 2023-12-02 NOTE — ED PROVIDER NOTE - OBJECTIVE STATEMENT
Patient with a past medical history of hyperlipidemia previously on a statin which was recently stopped is presenting for generalized muscle weakness and mild pain.  Present for the past week.  Was seen at this hospital and left AGAINST MEDICAL ADVICE after being found to have elevated CK.  Came back today due to persistent symptoms.  Denies any recent falls, fevers.  No recent diarrhea illnesses.  No history of similar episode.

## 2023-12-02 NOTE — ED ADULT NURSE NOTE - OBJECTIVE STATEMENT
patient has multiple complaints, bilateral shoulder pain with bilateral leg weakness and sob as claimed.

## 2023-12-02 NOTE — H&P ADULT - HISTORY OF PRESENT ILLNESS
Pt is  83-year-old male with PMHx of HTN , hyperlipidemia presented to the ED with  complaining of muscle weakness.  He was in the ED for same nov 28 signed AMA   He states taht started about 1 months ago with weakness in right shoulder some pain and unable to lift his shoulder , then was feeling left shoulder now he can not raise his arm over to shoulder . He also admits now having weakness in legs right worse then left leg thigh area and feesl like stiff , can not lift sitting in the chair   He denies any febrile ilness last few weeks , no fever , no HA , no vision problems ,no dizziness , no cough , no SOB   + dry mouth  Patient was  taking Lipitor, states that he stopped taking 1 week ago per PCP   patient states that he was also prescribed a medication recently for his liver, but is unsure what it was.  No further complaints at this time

## 2023-12-03 LAB
ALBUMIN SERPL ELPH-MCNC: 2.9 G/DL — LOW (ref 3.3–5.2)
ALBUMIN SERPL ELPH-MCNC: 2.9 G/DL — LOW (ref 3.3–5.2)
ALP SERPL-CCNC: 136 U/L — HIGH (ref 40–120)
ALP SERPL-CCNC: 136 U/L — HIGH (ref 40–120)
ALT FLD-CCNC: 522 U/L — HIGH
ALT FLD-CCNC: 522 U/L — HIGH
ANION GAP SERPL CALC-SCNC: 14 MMOL/L — SIGNIFICANT CHANGE UP (ref 5–17)
ANION GAP SERPL CALC-SCNC: 14 MMOL/L — SIGNIFICANT CHANGE UP (ref 5–17)
AST SERPL-CCNC: 600 U/L — HIGH
AST SERPL-CCNC: 600 U/L — HIGH
BILIRUB DIRECT SERPL-MCNC: 0.1 MG/DL — SIGNIFICANT CHANGE UP (ref 0–0.3)
BILIRUB DIRECT SERPL-MCNC: 0.1 MG/DL — SIGNIFICANT CHANGE UP (ref 0–0.3)
BILIRUB INDIRECT FLD-MCNC: 0.3 MG/DL — SIGNIFICANT CHANGE UP (ref 0.2–1)
BILIRUB INDIRECT FLD-MCNC: 0.3 MG/DL — SIGNIFICANT CHANGE UP (ref 0.2–1)
BILIRUB SERPL-MCNC: 0.4 MG/DL — SIGNIFICANT CHANGE UP (ref 0.4–2)
BILIRUB SERPL-MCNC: 0.4 MG/DL — SIGNIFICANT CHANGE UP (ref 0.4–2)
BUN SERPL-MCNC: 18.6 MG/DL — SIGNIFICANT CHANGE UP (ref 8–20)
BUN SERPL-MCNC: 18.6 MG/DL — SIGNIFICANT CHANGE UP (ref 8–20)
CALCIUM SERPL-MCNC: 8.7 MG/DL — SIGNIFICANT CHANGE UP (ref 8.4–10.5)
CALCIUM SERPL-MCNC: 8.7 MG/DL — SIGNIFICANT CHANGE UP (ref 8.4–10.5)
CHLORIDE SERPL-SCNC: 104 MMOL/L — SIGNIFICANT CHANGE UP (ref 96–108)
CHLORIDE SERPL-SCNC: 104 MMOL/L — SIGNIFICANT CHANGE UP (ref 96–108)
CK MB CFR SERPL CALC: 169.6 NG/ML — HIGH (ref 0–6.7)
CK MB CFR SERPL CALC: 169.6 NG/ML — HIGH (ref 0–6.7)
CK SERPL-CCNC: CRITICAL HIGH U/L (ref 30–200)
CK SERPL-CCNC: CRITICAL HIGH U/L (ref 30–200)
CO2 SERPL-SCNC: 22 MMOL/L — SIGNIFICANT CHANGE UP (ref 22–29)
CO2 SERPL-SCNC: 22 MMOL/L — SIGNIFICANT CHANGE UP (ref 22–29)
CORTIS AM PEAK SERPL-MCNC: 6.5 UG/DL — SIGNIFICANT CHANGE UP (ref 6–18.4)
CORTIS AM PEAK SERPL-MCNC: 6.5 UG/DL — SIGNIFICANT CHANGE UP (ref 6–18.4)
CREAT SERPL-MCNC: 0.56 MG/DL — SIGNIFICANT CHANGE UP (ref 0.5–1.3)
CREAT SERPL-MCNC: 0.56 MG/DL — SIGNIFICANT CHANGE UP (ref 0.5–1.3)
CULTURE RESULTS: SIGNIFICANT CHANGE UP
CULTURE RESULTS: SIGNIFICANT CHANGE UP
EGFR: 98 ML/MIN/1.73M2 — SIGNIFICANT CHANGE UP
EGFR: 98 ML/MIN/1.73M2 — SIGNIFICANT CHANGE UP
ENA JO1 AB SER-ACNC: <0.2 AI — SIGNIFICANT CHANGE UP
ENA JO1 AB SER-ACNC: <0.2 AI — SIGNIFICANT CHANGE UP
GGT SERPL-CCNC: 29 U/L — SIGNIFICANT CHANGE UP (ref 9–50)
GGT SERPL-CCNC: 29 U/L — SIGNIFICANT CHANGE UP (ref 9–50)
GLUCOSE SERPL-MCNC: 98 MG/DL — SIGNIFICANT CHANGE UP (ref 70–99)
GLUCOSE SERPL-MCNC: 98 MG/DL — SIGNIFICANT CHANGE UP (ref 70–99)
HAV IGM SER-ACNC: SIGNIFICANT CHANGE UP
HAV IGM SER-ACNC: SIGNIFICANT CHANGE UP
HBV CORE IGM SER-ACNC: SIGNIFICANT CHANGE UP
HBV CORE IGM SER-ACNC: SIGNIFICANT CHANGE UP
HBV SURFACE AB SER-ACNC: SIGNIFICANT CHANGE UP
HBV SURFACE AB SER-ACNC: SIGNIFICANT CHANGE UP
HBV SURFACE AG SER-ACNC: SIGNIFICANT CHANGE UP
HBV SURFACE AG SER-ACNC: SIGNIFICANT CHANGE UP
HCV AB S/CO SERPL IA: 0.06 S/CO — SIGNIFICANT CHANGE UP (ref 0–0.99)
HCV AB S/CO SERPL IA: 0.06 S/CO — SIGNIFICANT CHANGE UP (ref 0–0.99)
HCV AB SERPL-IMP: SIGNIFICANT CHANGE UP
HCV AB SERPL-IMP: SIGNIFICANT CHANGE UP
MYOGLOBIN SERPL-MCNC: 6780 NG/ML — HIGH (ref 28–72)
MYOGLOBIN SERPL-MCNC: 6780 NG/ML — HIGH (ref 28–72)
POTASSIUM SERPL-MCNC: 4 MMOL/L — SIGNIFICANT CHANGE UP (ref 3.5–5.3)
POTASSIUM SERPL-MCNC: 4 MMOL/L — SIGNIFICANT CHANGE UP (ref 3.5–5.3)
POTASSIUM SERPL-SCNC: 4 MMOL/L — SIGNIFICANT CHANGE UP (ref 3.5–5.3)
POTASSIUM SERPL-SCNC: 4 MMOL/L — SIGNIFICANT CHANGE UP (ref 3.5–5.3)
PROT SERPL-MCNC: 5.6 G/DL — LOW (ref 6.6–8.7)
PROT SERPL-MCNC: 5.6 G/DL — LOW (ref 6.6–8.7)
RHEUMATOID FACT SERPL-ACNC: <10 IU/ML — SIGNIFICANT CHANGE UP (ref 0–13)
RHEUMATOID FACT SERPL-ACNC: <10 IU/ML — SIGNIFICANT CHANGE UP (ref 0–13)
SODIUM SERPL-SCNC: 140 MMOL/L — SIGNIFICANT CHANGE UP (ref 135–145)
SODIUM SERPL-SCNC: 140 MMOL/L — SIGNIFICANT CHANGE UP (ref 135–145)
SPECIMEN SOURCE: SIGNIFICANT CHANGE UP
SPECIMEN SOURCE: SIGNIFICANT CHANGE UP
T4 FREE SERPL-MCNC: 1.1 NG/DL — SIGNIFICANT CHANGE UP (ref 0.9–1.8)
T4 FREE SERPL-MCNC: 1.1 NG/DL — SIGNIFICANT CHANGE UP (ref 0.9–1.8)

## 2023-12-03 PROCEDURE — 99232 SBSQ HOSP IP/OBS MODERATE 35: CPT

## 2023-12-03 PROCEDURE — 99223 1ST HOSP IP/OBS HIGH 75: CPT

## 2023-12-03 PROCEDURE — 71045 X-RAY EXAM CHEST 1 VIEW: CPT | Mod: 26

## 2023-12-03 PROCEDURE — 99233 SBSQ HOSP IP/OBS HIGH 50: CPT

## 2023-12-03 RX ORDER — SODIUM BICARBONATE 1 MEQ/ML
0.08 SYRINGE (ML) INTRAVENOUS
Qty: 75 | Refills: 0 | Status: DISCONTINUED | OUTPATIENT
Start: 2023-12-03 | End: 2023-12-03

## 2023-12-03 RX ORDER — BUDESONIDE, MICRONIZED 100 %
0.5 POWDER (GRAM) MISCELLANEOUS EVERY 12 HOURS
Refills: 0 | Status: DISCONTINUED | OUTPATIENT
Start: 2023-12-03 | End: 2023-12-03

## 2023-12-03 RX ORDER — HYDROCORTISONE 20 MG
40 TABLET ORAL EVERY 8 HOURS
Refills: 0 | Status: DISCONTINUED | OUTPATIENT
Start: 2023-12-03 | End: 2023-12-03

## 2023-12-03 RX ORDER — IPRATROPIUM/ALBUTEROL SULFATE 18-103MCG
3 AEROSOL WITH ADAPTER (GRAM) INHALATION EVERY 6 HOURS
Refills: 0 | Status: DISCONTINUED | OUTPATIENT
Start: 2023-12-03 | End: 2023-12-03

## 2023-12-03 RX ORDER — SODIUM BICARBONATE 1 MEQ/ML
0.23 SYRINGE (ML) INTRAVENOUS
Qty: 150 | Refills: 0 | Status: DISCONTINUED | OUTPATIENT
Start: 2023-12-03 | End: 2023-12-05

## 2023-12-03 RX ADMIN — PREGABALIN 1000 MICROGRAM(S): 225 CAPSULE ORAL at 13:10

## 2023-12-03 RX ADMIN — Medication 1 MILLIGRAM(S): at 13:10

## 2023-12-03 RX ADMIN — Medication 120 MEQ/KG/HR: at 15:02

## 2023-12-03 RX ADMIN — LISINOPRIL 10 MILLIGRAM(S): 2.5 TABLET ORAL at 05:09

## 2023-12-03 RX ADMIN — Medication 60 MILLIGRAM(S): at 05:09

## 2023-12-03 RX ADMIN — ENOXAPARIN SODIUM 40 MILLIGRAM(S): 100 INJECTION SUBCUTANEOUS at 05:08

## 2023-12-03 RX ADMIN — PANTOPRAZOLE SODIUM 40 MILLIGRAM(S): 20 TABLET, DELAYED RELEASE ORAL at 05:09

## 2023-12-03 NOTE — CONSULT NOTE ADULT - ASSESSMENT
The patient is a 83y Male who is followed by neurology because of weakness, proximal>distal for about 3 weeks, painless with elevated muscle enzymes (rhabdo) and myoglobinuria.  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   Given overall picture this does not appear to be consistent with stroke or spine disease (cord compression or myelopathy)  PT/OT eval/treat  Watch renal function given rhabdomyolysis, may need higher bicarb solution    Further recommendations pending results of ordered work up.    will follow with you    Piyush George MD PhD   [id number] The patient is a 83y Male who is followed by neurology because of weakness, proximal>distal for about 3 weeks, painless with elevated muscle enzymes (rhabdo) and myoglobinuria.  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   Given overall picture this does not appear to be consistent with stroke or spine disease (cord compression or myelopathy)  PT/OT eval/treat  Watch renal function given rhabdomyolysis, may need higher bicarb solution    Further recommendations pending results of ordered work up.    will follow with you    Piyush George MD PhD   [id number] The patient is a 83y Male who is followed by neurology because of weakness, proximal>distal for about 3 weeks, painless with elevated muscle enzymes (rhabdo) and myoglobinuria.  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   Given overall picture this does not appear to be consistent with stroke or spine disease (cord compression or myelopathy)  PT/OT eval/treat  Watch renal function given rhabdomyolysis, may need higher bicarb solution    Further recommendations pending results of ordered work up.    will follow with you    Piyush George MD PhD   [id number]

## 2023-12-03 NOTE — CONSULT NOTE ADULT - SUBJECTIVE AND OBJECTIVE BOX
[facility]                                     Neu[facility] at [place]                                 Doris Coker, & Dar                                  [address]                                        [place], [place], [address]                                             [phone]    CC: weakness  HPI: The patient is a 83y Male who presented with b/l proximal weakness for 3 weeks.  It started in the right arm and proceeded to effect left arm and then legs.  He is unable to raise arms to brush hair or arise from seated position without using hands to push up.  He says it started suddenly 3 weeks ago.  He also noted his urine being dark at roughly the same time.  He has no family history of weakness to suggest a muscular dystrophy and has no physical features of myotonic dystrophy.  he was at [facility] about  a week ago with CPK about 15,000 but apparently signed out AMA prior to evaluation  he returned yesterday because he was not getting better.  Neurology is asked to evaluate.    PAST MEDICAL & SURGICAL HISTORY:  HLD (hyperlipidemia)    Hypertension      MEDICATIONS  (STANDING):  cyanocobalamin 1000 MICROGram(s) Oral daily  enoxaparin Injectable 40 milliGRAM(s) SubCutaneous every 24 hours  folic acid 1 milliGRAM(s) Oral daily  lactated ringers. 1000 milliLiter(s) (100 mL/Hr) IV Continuous <Continuous>  lisinopril 10 milliGRAM(s) Oral daily  pantoprazole    Tablet 40 milliGRAM(s) Oral before breakfast  predniSONE   Tablet 60 milliGRAM(s) Oral daily    MEDICATIONS  (PRN):    Allergies    No Known Allergies    Intolerances    SOCIAL HISTORY:  no tob,   (+) alcohol on occasion  no drugs    FAMILY HISTORY:  No family history of muscle weakness    ROS: 14 point ROS negative other than what is present in HPI or below    Vital Signs Last 24 Hrs  T(C): 36.9 (03 Dec 2023 10:55), Max: 36.9 (03 Dec 2023 10:55)  T(F): 98.4 (03 Dec 2023 10:55), Max: 98.4 (03 Dec 2023 10:55)  HR: 70 (03 Dec 2023 10:55) (66 - 86)  BP: 115/62 (03 Dec 2023 10:55) (115/62 - 147/71)  BP(mean): 89 (02 Dec 2023 19:13) (89 - 89)  RR: 16 (03 Dec 2023 10:55) (16 - 20)  SpO2: 94% (03 Dec 2023 10:55) (92% - 99%)    Parameters below as of 03 Dec 2023 10:55  Patient On (Oxygen Delivery Method): room air      General: NAD    Detailed Neurologic Exam:    Mental status: The patient is awake and alert and has normal attention span.  The patient is fully oriented in 3 spheres. The patient is oriented to current events. The patient is able to name objects, follow commands, repeat sentences.    Cranial nerves: Pupils equal and react symmetrically to light. There is no visual field deficit to confrontation. Extraocular motion is full with no nystagmus. There is no ptosis. Facial sensation is intact. Facial musculature is symmetric. Palate elevates symmetrically. Tongue is midline.    Motor: There is normal bulk and tone.  There is no tremor.  Strength is 1-2/5 in the right deltoid unable to abduct well  has 4+/5 distal power in arm. Has 2-3/5 IPS better (4+/5) in distal right leg.   Strength is 1-2/5 in the left deltoid unable to abduct well  has 4+/5 distal power in arm. Has 2/5 IPS better (4+/5) in distal leftleg.     Sensation: Intact to light touch and pin in 4 extremities    Reflexes: 2+ patella and blecps and plantar responses are flexor.    Cerebellar: There is no dysmetria on finger to nose testing given limitations of shoulder weakness.    Gait : deferred    LABS:                         12.7   12.12 )-----------( 427      ( 02 Dec 2023 12:32 )             36.9       12-02    137  |  100  |  15.7  ----------------------------<  92  4.4   |  24.0  |  0.52    Ca    8.8      02 Dec 2023 12:32    TPro  5.6<L>  /  Alb  2.9<L>  /  TBili  0.4  /  DBili  0.1  /  AST  600<H>  /  ALT  522<H>  /  AlkPhos  136<H>  12-03    Sedimentation Rate, Erythrocyte (12.02.23 @ 16:38)   Sedimentation Rate, Erythrocyte: 53:   C-Reactive Protein, Serum (12.02.23 @ 16:38)   C-Reactive Protein, Serum: 8 mg/L  Creatine Kinase, Serum in AM (12.03.23 @ 03:33)   Creatine Kinase, Serum: 22046:  CKMB Units (12.03.23 @ 03:33)   CKMB Units: 169.6 ng/mLHepatic Function Panel in AM (12.03.23 @ 03:33)   Myoglobin (12.02.23 @ 16:38)   Myoglobin: 6780 ng/mL  Lactate Dehydrogenase, Serum (12.02.23 @ 16:38)   Lactate Dehydrogenase, Serum: 1208 U/L  Indirect Reacting Bilirubin: 0.3 mg/dL  Protein Total: 5.6 g/dL  Albumin: 2.9 g/dL  Bilirubin Total: 0.4 mg/dL  Bilirubin Direct: 0.1 mg/dL  Alkaline Phosphatase: 136 U/L  Aspartate Aminotransferase (AST/SGOT): 600 U/L  Alanine Aminotransferase (ALT/SGPT): 522 U/L  Gamma Glutamyl Transferase, Serum (12.02.23 @ 16:38)   Gamma Glutamyl Transferase, Serum: 29 U/L  Transferrin, Serum (12.02.23 @ 16:38)   Transferrin, Serum: 193 mg/dL  Ferritin (12.02.23 @ 16:38)   Rheumatoid Factor Quant, Serum or Plasma (12.02.23 @ 16:38)   Rheumatoid Factor Quant, Serum or Plasma: <10: Test Repeated IU/mLFerritin: 620 ng/mLVitamin  Iron with Total Binding Capacity (12.02.23 @ 16:38)   Iron - Total Binding Capacity.: 276 ug/dL  % Saturation, Iron: 13 %  Iron Total: 36 ug/dL   B12, Serum (12.02.23 @ 16:38)   Vitamin B12, Serum: 340 pg/mL  Rheumatoid Factor Quant, Serum or Plasma (12.02.23 @ 16:38)   Rheumatoid Factor Quant, Serum or Plasma: <10: Test Repeated IU/mL  Urinalysis + Microscopic Examination (12.02.23 @ 15:00)   pH Urine: 5.5  Urine Appearance: Turbid  Color: Dark Yellow  Specific Gravity: 1.027  Protein, Urine: 100 mg/dL  Glucose Qualitative, Urine: Negative mg/dL  Ketone - Urine: Negative mg/dL  Blood, Urine: Large  Bilirubin: Small  Urobilinogen: 1.0 mg/dL  Leukocyte Esterase Concentration: Small  Nitrite: Negative  White Blood Cell - Urine: 1 /HPF  Red Blood Cell - Urine: 272 /HPF  Bacteria: Negative /HPF  Cast: 7 /LPF  Epithelial Cells: 1 /HPF  RADIOLOGY & ADDITIONAL STUDIES (independently reviewed unless otherwise noted):  no neuro studies                                [facility]                                     Neurology at [place]                                 Doris Coker, & Dar                                  [address]                                        [place], [place], [address]                                             [phone]    CC: weakness  HPI: The patient is a 83y Male who presented with b/l proximal weakness for 3 weeks.  It started in the right arm and proceeded to effect left arm and then legs.  He is unable to raise arms to brush hair or arise from seated position without using hands to push up.  He says it started suddenly 3 weeks ago.  He also noted his urine being dark at roughly the same time.  He has no family history of weakness to suggest a muscular dystrophy and has no physical features of myotonic dystrophy.  he was at [facility] about  a week ago with CPK about 15,000 but apparently signed out AMA prior to evaluation  he returned yesterday because he was not getting better.  Neurology is asked to evaluate.    PAST MEDICAL & SURGICAL HISTORY:  HLD (hyperlipidemia)    Hypertension      MEDICATIONS  (STANDING):  cyanocobalamin 1000 MICROGram(s) Oral daily  enoxaparin Injectable 40 milliGRAM(s) SubCutaneous every 24 hours  folic acid 1 milliGRAM(s) Oral daily  lactated ringers. 1000 milliLiter(s) (100 mL/Hr) IV Continuous <Continuous>  lisinopril 10 milliGRAM(s) Oral daily  pantoprazole    Tablet 40 milliGRAM(s) Oral before breakfast  predniSONE   Tablet 60 milliGRAM(s) Oral daily    MEDICATIONS  (PRN):    Allergies    No Known Allergies    Intolerances    SOCIAL HISTORY:  no tob,   (+) alcohol on occasion  no drugs    FAMILY HISTORY:  No family history of muscle weakness    ROS: 14 point ROS negative other than what is present in HPI or below    Vital Signs Last 24 Hrs  T(C): 36.9 (03 Dec 2023 10:55), Max: 36.9 (03 Dec 2023 10:55)  T(F): 98.4 (03 Dec 2023 10:55), Max: 98.4 (03 Dec 2023 10:55)  HR: 70 (03 Dec 2023 10:55) (66 - 86)  BP: 115/62 (03 Dec 2023 10:55) (115/62 - 147/71)  BP(mean): 89 (02 Dec 2023 19:13) (89 - 89)  RR: 16 (03 Dec 2023 10:55) (16 - 20)  SpO2: 94% (03 Dec 2023 10:55) (92% - 99%)    Parameters below as of 03 Dec 2023 10:55  Patient On (Oxygen Delivery Method): room air      General: NAD    Detailed Neurologic Exam:    Mental status: The patient is awake and alert and has normal attention span.  The patient is fully oriented in 3 spheres. The patient is oriented to current events. The patient is able to name objects, follow commands, repeat sentences.    Cranial nerves: Pupils equal and react symmetrically to light. There is no visual field deficit to confrontation. Extraocular motion is full with no nystagmus. There is no ptosis. Facial sensation is intact. Facial musculature is symmetric. Palate elevates symmetrically. Tongue is midline.    Motor: There is normal bulk and tone.  There is no tremor.  Strength is 1-2/5 in the right deltoid unable to abduct well  has 4+/5 distal power in arm. Has 2-3/5 IPS better (4+/5) in distal right leg.   Strength is 1-2/5 in the left deltoid unable to abduct well  has 4+/5 distal power in arm. Has 2/5 IPS better (4+/5) in distal leftleg.     Sensation: Intact to light touch and pin in 4 extremities    Reflexes: 2+ patella and blecps and plantar responses are flexor.    Cerebellar: There is no dysmetria on finger to nose testing given limitations of shoulder weakness.    Gait : deferred    LABS:                         12.7   12.12 )-----------( 427      ( 02 Dec 2023 12:32 )             36.9       12-02    137  |  100  |  15.7  ----------------------------<  92  4.4   |  24.0  |  0.52    Ca    8.8      02 Dec 2023 12:32    TPro  5.6<L>  /  Alb  2.9<L>  /  TBili  0.4  /  DBili  0.1  /  AST  600<H>  /  ALT  522<H>  /  AlkPhos  136<H>  12-03    Sedimentation Rate, Erythrocyte (12.02.23 @ 16:38)   Sedimentation Rate, Erythrocyte: 53:   C-Reactive Protein, Serum (12.02.23 @ 16:38)   C-Reactive Protein, Serum: 8 mg/L  Creatine Kinase, Serum in AM (12.03.23 @ 03:33)   Creatine Kinase, Serum: 57774:  CKMB Units (12.03.23 @ 03:33)   CKMB Units: 169.6 ng/mLHepatic Function Panel in AM (12.03.23 @ 03:33)   Myoglobin (12.02.23 @ 16:38)   Myoglobin: 6780 ng/mL  Lactate Dehydrogenase, Serum (12.02.23 @ 16:38)   Lactate Dehydrogenase, Serum: 1208 U/L  Indirect Reacting Bilirubin: 0.3 mg/dL  Protein Total: 5.6 g/dL  Albumin: 2.9 g/dL  Bilirubin Total: 0.4 mg/dL  Bilirubin Direct: 0.1 mg/dL  Alkaline Phosphatase: 136 U/L  Aspartate Aminotransferase (AST/SGOT): 600 U/L  Alanine Aminotransferase (ALT/SGPT): 522 U/L  Gamma Glutamyl Transferase, Serum (12.02.23 @ 16:38)   Gamma Glutamyl Transferase, Serum: 29 U/L  Transferrin, Serum (12.02.23 @ 16:38)   Transferrin, Serum: 193 mg/dL  Ferritin (12.02.23 @ 16:38)   Rheumatoid Factor Quant, Serum or Plasma (12.02.23 @ 16:38)   Rheumatoid Factor Quant, Serum or Plasma: <10: Test Repeated IU/mLFerritin: 620 ng/mLVitamin  Iron with Total Binding Capacity (12.02.23 @ 16:38)   Iron - Total Binding Capacity.: 276 ug/dL  % Saturation, Iron: 13 %  Iron Total: 36 ug/dL   B12, Serum (12.02.23 @ 16:38)   Vitamin B12, Serum: 340 pg/mL  Rheumatoid Factor Quant, Serum or Plasma (12.02.23 @ 16:38)   Rheumatoid Factor Quant, Serum or Plasma: <10: Test Repeated IU/mL  Urinalysis + Microscopic Examination (12.02.23 @ 15:00)   pH Urine: 5.5  Urine Appearance: Turbid  Color: Dark Yellow  Specific Gravity: 1.027  Protein, Urine: 100 mg/dL  Glucose Qualitative, Urine: Negative mg/dL  Ketone - Urine: Negative mg/dL  Blood, Urine: Large  Bilirubin: Small  Urobilinogen: 1.0 mg/dL  Leukocyte Esterase Concentration: Small  Nitrite: Negative  White Blood Cell - Urine: 1 /HPF  Red Blood Cell - Urine: 272 /HPF  Bacteria: Negative /HPF  Cast: 7 /LPF  Epithelial Cells: 1 /HPF  RADIOLOGY & ADDITIONAL STUDIES (independently reviewed unless otherwise noted):  no neuro studies                                [facility]                                     Neurology at [place]                                 Doris Coker, & Dar                                  [address]                                        [place], [place], [address]                                             [phone]    CC: weakness  HPI: The patient is a 83y Male who presented with b/l proximal weakness for 3 weeks.  It started in the right arm and proceeded to effect left arm and then legs.  He is unable to raise arms to brush hair or arise from seated position without using hands to push up.  He says it started suddenly 3 weeks ago.  He also noted his urine being dark at roughly the same time.  He has no family history of weakness to suggest a muscular dystrophy and has no physical features of myotonic dystrophy.  he was at [facility] about  a week ago with CPK about 15,000 but apparently signed out AMA prior to evaluation  he returned yesterday because he was not getting better.  Neurology is asked to evaluate.    PAST MEDICAL & SURGICAL HISTORY:  HLD (hyperlipidemia)    Hypertension      MEDICATIONS  (STANDING):  cyanocobalamin 1000 MICROGram(s) Oral daily  enoxaparin Injectable 40 milliGRAM(s) SubCutaneous every 24 hours  folic acid 1 milliGRAM(s) Oral daily  lactated ringers. 1000 milliLiter(s) (100 mL/Hr) IV Continuous <Continuous>  lisinopril 10 milliGRAM(s) Oral daily  pantoprazole    Tablet 40 milliGRAM(s) Oral before breakfast  predniSONE   Tablet 60 milliGRAM(s) Oral daily    MEDICATIONS  (PRN):    Allergies    No Known Allergies    Intolerances    SOCIAL HISTORY:  no tob,   (+) alcohol on occasion  no drugs    FAMILY HISTORY:  No family history of muscle weakness    ROS: 14 point ROS negative other than what is present in HPI or below    Vital Signs Last 24 Hrs  T(C): 36.9 (03 Dec 2023 10:55), Max: 36.9 (03 Dec 2023 10:55)  T(F): 98.4 (03 Dec 2023 10:55), Max: 98.4 (03 Dec 2023 10:55)  HR: 70 (03 Dec 2023 10:55) (66 - 86)  BP: 115/62 (03 Dec 2023 10:55) (115/62 - 147/71)  BP(mean): 89 (02 Dec 2023 19:13) (89 - 89)  RR: 16 (03 Dec 2023 10:55) (16 - 20)  SpO2: 94% (03 Dec 2023 10:55) (92% - 99%)    Parameters below as of 03 Dec 2023 10:55  Patient On (Oxygen Delivery Method): room air      General: NAD    Detailed Neurologic Exam:    Mental status: The patient is awake and alert and has normal attention span.  The patient is fully oriented in 3 spheres. The patient is oriented to current events. The patient is able to name objects, follow commands, repeat sentences.    Cranial nerves: Pupils equal and react symmetrically to light. There is no visual field deficit to confrontation. Extraocular motion is full with no nystagmus. There is no ptosis. Facial sensation is intact. Facial musculature is symmetric. Palate elevates symmetrically. Tongue is midline.    Motor: There is normal bulk and tone.  There is no tremor.  Strength is 1-2/5 in the right deltoid unable to abduct well  has 4+/5 distal power in arm. Has 2-3/5 IPS better (4+/5) in distal right leg.   Strength is 1-2/5 in the left deltoid unable to abduct well  has 4+/5 distal power in arm. Has 2/5 IPS better (4+/5) in distal leftleg.     Sensation: Intact to light touch and pin in 4 extremities    Reflexes: 2+ patella and blecps and plantar responses are flexor.    Cerebellar: There is no dysmetria on finger to nose testing given limitations of shoulder weakness.    Gait : deferred    LABS:                         12.7   12.12 )-----------( 427      ( 02 Dec 2023 12:32 )             36.9       12-02    137  |  100  |  15.7  ----------------------------<  92  4.4   |  24.0  |  0.52    Ca    8.8      02 Dec 2023 12:32    TPro  5.6<L>  /  Alb  2.9<L>  /  TBili  0.4  /  DBili  0.1  /  AST  600<H>  /  ALT  522<H>  /  AlkPhos  136<H>  12-03    Sedimentation Rate, Erythrocyte (12.02.23 @ 16:38)   Sedimentation Rate, Erythrocyte: 53:   C-Reactive Protein, Serum (12.02.23 @ 16:38)   C-Reactive Protein, Serum: 8 mg/L  Creatine Kinase, Serum in AM (12.03.23 @ 03:33)   Creatine Kinase, Serum: 46257:  CKMB Units (12.03.23 @ 03:33)   CKMB Units: 169.6 ng/mLHepatic Function Panel in AM (12.03.23 @ 03:33)   Myoglobin (12.02.23 @ 16:38)   Myoglobin: 6780 ng/mL  Lactate Dehydrogenase, Serum (12.02.23 @ 16:38)   Lactate Dehydrogenase, Serum: 1208 U/L  Indirect Reacting Bilirubin: 0.3 mg/dL  Protein Total: 5.6 g/dL  Albumin: 2.9 g/dL  Bilirubin Total: 0.4 mg/dL  Bilirubin Direct: 0.1 mg/dL  Alkaline Phosphatase: 136 U/L  Aspartate Aminotransferase (AST/SGOT): 600 U/L  Alanine Aminotransferase (ALT/SGPT): 522 U/L  Gamma Glutamyl Transferase, Serum (12.02.23 @ 16:38)   Gamma Glutamyl Transferase, Serum: 29 U/L  Transferrin, Serum (12.02.23 @ 16:38)   Transferrin, Serum: 193 mg/dL  Ferritin (12.02.23 @ 16:38)   Rheumatoid Factor Quant, Serum or Plasma (12.02.23 @ 16:38)   Rheumatoid Factor Quant, Serum or Plasma: <10: Test Repeated IU/mLFerritin: 620 ng/mLVitamin  Iron with Total Binding Capacity (12.02.23 @ 16:38)   Iron - Total Binding Capacity.: 276 ug/dL  % Saturation, Iron: 13 %  Iron Total: 36 ug/dL   B12, Serum (12.02.23 @ 16:38)   Vitamin B12, Serum: 340 pg/mL  Rheumatoid Factor Quant, Serum or Plasma (12.02.23 @ 16:38)   Rheumatoid Factor Quant, Serum or Plasma: <10: Test Repeated IU/mL  Urinalysis + Microscopic Examination (12.02.23 @ 15:00)   pH Urine: 5.5  Urine Appearance: Turbid  Color: Dark Yellow  Specific Gravity: 1.027  Protein, Urine: 100 mg/dL  Glucose Qualitative, Urine: Negative mg/dL  Ketone - Urine: Negative mg/dL  Blood, Urine: Large  Bilirubin: Small  Urobilinogen: 1.0 mg/dL  Leukocyte Esterase Concentration: Small  Nitrite: Negative  White Blood Cell - Urine: 1 /HPF  Red Blood Cell - Urine: 272 /HPF  Bacteria: Negative /HPF  Cast: 7 /LPF  Epithelial Cells: 1 /HPF  RADIOLOGY & ADDITIONAL STUDIES (independently reviewed unless otherwise noted):  no neuro studies

## 2023-12-03 NOTE — PROGRESS NOTE ADULT - ASSESSMENT
84 yo M with HTN , hyperlipidemia presented with shoulder arm pain , weakness of arms and lower leg high CPK       1-  muscle weakness   r/o myopathy inflamation vs infection   work up serology ordered   add na bicarbonate iv infusion   check myoglobulin in urine , serum in am   rhabdo IVF   monitor CPK   neurology and rheumotology rec appreciated   started empirical po prednisone 60 mg daily 12/2   MR of right shoulder ;   may need muscle biopsy   hold lipitor   trend  CPK , LFTS   monitor kidney function     2- HTN   will hold lisnipril givn risk of renal issues in setting of rhabdo high CPK   BP is controlled   start amlodipine as needed for BP control     DVT prophylaxis     OOB   DC clinical progress    82 yo M with HTN , hyperlipidemia presented with shoulder arm pain , weakness of arms and lower leg high CPK       1-  muscle weakness   r/o myopathy inflamation vs infection   work up serology ordered   add na bicarbonate iv infusion   check myoglobulin in urine , serum in am   rhabdo IVF   monitor CPK   neurology and rheumotology rec appreciated   started empirical po prednisone 60 mg daily 12/2   MR of right shoulder ;   may need muscle biopsy   hold lipitor   trend  CPK , LFTS   monitor kidney function     2- HTN   will hold lisnipril givn risk of renal issues in setting of rhabdo high CPK   BP is controlled   start amlodipine as needed for BP control     DVT prophylaxis     OOB   DC clinical progress

## 2023-12-03 NOTE — PROGRESS NOTE ADULT - SUBJECTIVE AND OBJECTIVE BOX
Patient is a 83y old  Male who presents with a chief complaint of     Patient seen and examined at bedside. No overnight events reported.     ALLERGIES:  No Known Allergies    MEDICATIONS  (STANDING):  cyanocobalamin 1000 MICROGram(s) Oral daily  enoxaparin Injectable 40 milliGRAM(s) SubCutaneous every 24 hours  folic acid 1 milliGRAM(s) Oral daily  lactated ringers. 1000 milliLiter(s) (100 mL/Hr) IV Continuous <Continuous>  lisinopril 10 milliGRAM(s) Oral daily  pantoprazole    Tablet 40 milliGRAM(s) Oral before breakfast  predniSONE   Tablet 60 milliGRAM(s) Oral daily    MEDICATIONS  (PRN):    Vital Signs Last 24 Hrs  T(F): 98.4 (03 Dec 2023 10:55), Max: 98.4 (03 Dec 2023 10:55)  HR: 70 (03 Dec 2023 10:55) (66 - 86)  BP: 115/62 (03 Dec 2023 10:55) (115/62 - 147/71)  RR: 16 (03 Dec 2023 10:55) (16 - 20)  SpO2: 94% (03 Dec 2023 10:55) (92% - 99%)  I&O's Summary    03 Dec 2023 07:01  -  03 Dec 2023 14:06  --------------------------------------------------------  IN: 480 mL / OUT: 0 mL / NET: 480 mL        PHYSICAL EXAM:  General:   ENT:   Neck:   Lungs:   Cardio: RRR, S1/S2, No murmur  Abdomen: Soft, Nontender, Nondistended; Bowel sounds present  Extremities: No calf tenderness, No pitting edema    LABS:                        12.7   12.12 )-----------( 427      ( 02 Dec 2023 12:32 )             36.9     12-02    137  |  100  |  15.7  ----------------------------<  92  4.4   |  24.0  |  0.52    Ca    8.8      02 Dec 2023 12:32    TPro  5.6  /  Alb  2.9  /  TBili  0.4  /  DBili  0.1  /  AST  600  /  ALT  522  /  AlkPhos  136  12-03                CARDIAC MARKERS ( 03 Dec 2023 03:33 )  x     / x     / 35520 U/L / x     / 169.6 ng/mL  CARDIAC MARKERS ( 02 Dec 2023 12:32 )  x     / x     / 50204 U/L / x     / 244.8 ng/mL        TSH 1.31   TSH with FT4 reflex --  Total T3 95                  Urinalysis Basic - ( 02 Dec 2023 15:00 )    Color: Dark Yellow / Appearance: Turbid / S.027 / pH: x  Gluc: x / Ketone: Negative mg/dL  / Bili: Small / Urobili: 1.0 mg/dL   Blood: x / Protein: 100 mg/dL / Nitrite: Negative   Leuk Esterase: Small / RBC: 272 /HPF / WBC 1 /HPF   Sq Epi: x / Non Sq Epi: 1 /HPF / Bacteria: Negative /HPF          RADIOLOGY & ADDITIONAL TESTS:       Patient is a 83y old  Male who presents with a chief complaint of     Patient seen and examined at bedside. No overnight events reported.     ALLERGIES:  No Known Allergies    MEDICATIONS  (STANDING):  cyanocobalamin 1000 MICROGram(s) Oral daily  enoxaparin Injectable 40 milliGRAM(s) SubCutaneous every 24 hours  folic acid 1 milliGRAM(s) Oral daily  lactated ringers. 1000 milliLiter(s) (100 mL/Hr) IV Continuous <Continuous>  lisinopril 10 milliGRAM(s) Oral daily  pantoprazole    Tablet 40 milliGRAM(s) Oral before breakfast  predniSONE   Tablet 60 milliGRAM(s) Oral daily    MEDICATIONS  (PRN):    Vital Signs Last 24 Hrs  T(F): 98.4 (03 Dec 2023 10:55), Max: 98.4 (03 Dec 2023 10:55)  HR: 70 (03 Dec 2023 10:55) (66 - 86)  BP: 115/62 (03 Dec 2023 10:55) (115/62 - 147/71)  RR: 16 (03 Dec 2023 10:55) (16 - 20)  SpO2: 94% (03 Dec 2023 10:55) (92% - 99%)  I&O's Summary    03 Dec 2023 07:01  -  03 Dec 2023 14:06  --------------------------------------------------------  IN: 480 mL / OUT: 0 mL / NET: 480 mL        PHYSICAL EXAM:  General:   ENT:   Neck:   Lungs:   Cardio: RRR, S1/S2, No murmur  Abdomen: Soft, Nontender, Nondistended; Bowel sounds present  Extremities: No calf tenderness, No pitting edema    LABS:                        12.7   12.12 )-----------( 427      ( 02 Dec 2023 12:32 )             36.9     12-02    137  |  100  |  15.7  ----------------------------<  92  4.4   |  24.0  |  0.52    Ca    8.8      02 Dec 2023 12:32    TPro  5.6  /  Alb  2.9  /  TBili  0.4  /  DBili  0.1  /  AST  600  /  ALT  522  /  AlkPhos  136  12-03                CARDIAC MARKERS ( 03 Dec 2023 03:33 )  x     / x     / 24752 U/L / x     / 169.6 ng/mL  CARDIAC MARKERS ( 02 Dec 2023 12:32 )  x     / x     / 55748 U/L / x     / 244.8 ng/mL        TSH 1.31   TSH with FT4 reflex --  Total T3 95                  Urinalysis Basic - ( 02 Dec 2023 15:00 )    Color: Dark Yellow / Appearance: Turbid / S.027 / pH: x  Gluc: x / Ketone: Negative mg/dL  / Bili: Small / Urobili: 1.0 mg/dL   Blood: x / Protein: 100 mg/dL / Nitrite: Negative   Leuk Esterase: Small / RBC: 272 /HPF / WBC 1 /HPF   Sq Epi: x / Non Sq Epi: 1 /HPF / Bacteria: Negative /HPF          RADIOLOGY & ADDITIONAL TESTS:       Patient is a 83y old  Male who presents with a chief complaint of     Patient seen and examined at bedside. He is stable   denies any pain in arm or leg   c/w weakness feels stiff in the right leg below knee     neurology consult noted , d/w Dr Jefferson rheumotology on  , rec appreciated   MR of arm pending     cont po prednisone     ALLERGIES:  No Known Allergies    MEDICATIONS  (STANDING):  cyanocobalamin 1000 MICROGram(s) Oral daily  enoxaparin Injectable 40 milliGRAM(s) SubCutaneous every 24 hours  folic acid 1 milliGRAM(s) Oral daily  lactated ringers. 1000 milliLiter(s) (100 mL/Hr) IV Continuous <Continuous>  lisinopril 10 milliGRAM(s) Oral daily  pantoprazole    Tablet 40 milliGRAM(s) Oral before breakfast  predniSONE   Tablet 60 milliGRAM(s) Oral daily    MEDICATIONS  (PRN):    Vital Signs Last 24 Hrs  T(F): 98.4 (03 Dec 2023 10:55), Max: 98.4 (03 Dec 2023 10:55)  HR: 70 (03 Dec 2023 10:55) (66 - 86)  BP: 115/62 (03 Dec 2023 10:55) (115/62 - 147/71)  RR: 16 (03 Dec 2023 10:55) (16 - 20)  SpO2: 94% (03 Dec 2023 10:55) (92% - 99%)  I&O's Summary    03 Dec 2023 07:01  -  03 Dec 2023 14:06  --------------------------------------------------------  IN: 480 mL / OUT: 0 mL / NET: 480 mL        PHYSICAL EXAM:  General: awake alert   Neck: supple . no JVD   Lungs: CTA bilateral   Cardio: RRR, S1/S2, No murmur  Abdomen: Soft, Nontender, Nondistended; Bowel sounds present  Extremities: No calf tenderness, No pitting edema  Musculoskletal : arm weakness     LABS:                        12.7   12.12 )-----------( 427      ( 02 Dec 2023 12:32 )             36.9     12-    137  |  100  |  15.7  ----------------------------<  92  4.4   |  24.0  |  0.52    Ca    8.8      02 Dec 2023 12:32    TPro  5.6  /  Alb  2.9  /  TBili  0.4  /  DBili  0.1  /  AST  600  /  ALT  522  /  AlkPhos  136  12-03                CARDIAC MARKERS ( 03 Dec 2023 03:33 )  x     / x     / 57465 U/L / x     / 169.6 ng/mL  CARDIAC MARKERS ( 02 Dec 2023 12:32 )  x     / x     / 33437 U/L / x     / 244.8 ng/mL        TSH 1.31   TSH with FT4 reflex --  Total T3 95                  Urinalysis Basic - ( 02 Dec 2023 15:00 )    Color: Dark Yellow / Appearance: Turbid / S.027 / pH: x  Gluc: x / Ketone: Negative mg/dL  / Bili: Small / Urobili: 1.0 mg/dL   Blood: x / Protein: 100 mg/dL / Nitrite: Negative   Leuk Esterase: Small / RBC: 272 /HPF / WBC 1 /HPF   Sq Epi: x / Non Sq Epi: 1 /HPF / Bacteria: Negative /HPF          RADIOLOGY & ADDITIONAL TESTS:       Patient is a 83y old  Male who presents with a chief complaint of     Patient seen and examined at bedside. He is stable   denies any pain in arm or leg   c/w weakness feels stiff in the right leg below knee     neurology consult noted , d/w Dr Jefferson rheumotology on  , rec appreciated   MR of arm pending     cont po prednisone     ALLERGIES:  No Known Allergies    MEDICATIONS  (STANDING):  cyanocobalamin 1000 MICROGram(s) Oral daily  enoxaparin Injectable 40 milliGRAM(s) SubCutaneous every 24 hours  folic acid 1 milliGRAM(s) Oral daily  lactated ringers. 1000 milliLiter(s) (100 mL/Hr) IV Continuous <Continuous>  lisinopril 10 milliGRAM(s) Oral daily  pantoprazole    Tablet 40 milliGRAM(s) Oral before breakfast  predniSONE   Tablet 60 milliGRAM(s) Oral daily    MEDICATIONS  (PRN):    Vital Signs Last 24 Hrs  T(F): 98.4 (03 Dec 2023 10:55), Max: 98.4 (03 Dec 2023 10:55)  HR: 70 (03 Dec 2023 10:55) (66 - 86)  BP: 115/62 (03 Dec 2023 10:55) (115/62 - 147/71)  RR: 16 (03 Dec 2023 10:55) (16 - 20)  SpO2: 94% (03 Dec 2023 10:55) (92% - 99%)  I&O's Summary    03 Dec 2023 07:01  -  03 Dec 2023 14:06  --------------------------------------------------------  IN: 480 mL / OUT: 0 mL / NET: 480 mL        PHYSICAL EXAM:  General: awake alert   Neck: supple . no JVD   Lungs: CTA bilateral   Cardio: RRR, S1/S2, No murmur  Abdomen: Soft, Nontender, Nondistended; Bowel sounds present  Extremities: No calf tenderness, No pitting edema  Musculoskletal : arm weakness     LABS:                        12.7   12.12 )-----------( 427      ( 02 Dec 2023 12:32 )             36.9     12-    137  |  100  |  15.7  ----------------------------<  92  4.4   |  24.0  |  0.52    Ca    8.8      02 Dec 2023 12:32    TPro  5.6  /  Alb  2.9  /  TBili  0.4  /  DBili  0.1  /  AST  600  /  ALT  522  /  AlkPhos  136  12-03                CARDIAC MARKERS ( 03 Dec 2023 03:33 )  x     / x     / 31184 U/L / x     / 169.6 ng/mL  CARDIAC MARKERS ( 02 Dec 2023 12:32 )  x     / x     / 48531 U/L / x     / 244.8 ng/mL        TSH 1.31   TSH with FT4 reflex --  Total T3 95                  Urinalysis Basic - ( 02 Dec 2023 15:00 )    Color: Dark Yellow / Appearance: Turbid / S.027 / pH: x  Gluc: x / Ketone: Negative mg/dL  / Bili: Small / Urobili: 1.0 mg/dL   Blood: x / Protein: 100 mg/dL / Nitrite: Negative   Leuk Esterase: Small / RBC: 272 /HPF / WBC 1 /HPF   Sq Epi: x / Non Sq Epi: 1 /HPF / Bacteria: Negative /HPF          RADIOLOGY & ADDITIONAL TESTS:

## 2023-12-04 LAB
ACE SERPL-CCNC: 16 U/L — SIGNIFICANT CHANGE UP (ref 14–82)
ACE SERPL-CCNC: 16 U/L — SIGNIFICANT CHANGE UP (ref 14–82)
ALBUMIN SERPL ELPH-MCNC: 2.4 G/DL — LOW (ref 3.3–5.2)
ALBUMIN SERPL ELPH-MCNC: 2.4 G/DL — LOW (ref 3.3–5.2)
ALDOLASE SERPL-CCNC: 100.8 U/L — HIGH (ref 3.3–10.3)
ALDOLASE SERPL-CCNC: 100.8 U/L — HIGH (ref 3.3–10.3)
ALDOLASE SERPL-CCNC: 91.8 U/L — HIGH (ref 3.3–10.3)
ALDOLASE SERPL-CCNC: 91.8 U/L — HIGH (ref 3.3–10.3)
ALP SERPL-CCNC: 116 U/L — SIGNIFICANT CHANGE UP (ref 40–120)
ALP SERPL-CCNC: 116 U/L — SIGNIFICANT CHANGE UP (ref 40–120)
ALT FLD-CCNC: 478 U/L — HIGH
ALT FLD-CCNC: 478 U/L — HIGH
ANION GAP SERPL CALC-SCNC: 11 MMOL/L — SIGNIFICANT CHANGE UP (ref 5–17)
ANION GAP SERPL CALC-SCNC: 11 MMOL/L — SIGNIFICANT CHANGE UP (ref 5–17)
ANION GAP SERPL CALC-SCNC: 12 MMOL/L — SIGNIFICANT CHANGE UP (ref 5–17)
ANION GAP SERPL CALC-SCNC: 12 MMOL/L — SIGNIFICANT CHANGE UP (ref 5–17)
AST SERPL-CCNC: 506 U/L — HIGH
AST SERPL-CCNC: 506 U/L — HIGH
BASOPHILS # BLD AUTO: 0.1 K/UL — SIGNIFICANT CHANGE UP (ref 0–0.2)
BASOPHILS # BLD AUTO: 0.1 K/UL — SIGNIFICANT CHANGE UP (ref 0–0.2)
BASOPHILS NFR BLD AUTO: 0.7 % — SIGNIFICANT CHANGE UP (ref 0–2)
BASOPHILS NFR BLD AUTO: 0.7 % — SIGNIFICANT CHANGE UP (ref 0–2)
BILIRUB SERPL-MCNC: 0.3 MG/DL — LOW (ref 0.4–2)
BILIRUB SERPL-MCNC: 0.3 MG/DL — LOW (ref 0.4–2)
BUN SERPL-MCNC: 14.4 MG/DL — SIGNIFICANT CHANGE UP (ref 8–20)
BUN SERPL-MCNC: 14.4 MG/DL — SIGNIFICANT CHANGE UP (ref 8–20)
BUN SERPL-MCNC: 16.6 MG/DL — SIGNIFICANT CHANGE UP (ref 8–20)
BUN SERPL-MCNC: 16.6 MG/DL — SIGNIFICANT CHANGE UP (ref 8–20)
CALCIUM SERPL-MCNC: 7.9 MG/DL — LOW (ref 8.4–10.5)
CALCIUM SERPL-MCNC: 7.9 MG/DL — LOW (ref 8.4–10.5)
CALCIUM SERPL-MCNC: 8.2 MG/DL — LOW (ref 8.4–10.5)
CALCIUM SERPL-MCNC: 8.2 MG/DL — LOW (ref 8.4–10.5)
CHLORIDE SERPL-SCNC: 102 MMOL/L — SIGNIFICANT CHANGE UP (ref 96–108)
CHLORIDE SERPL-SCNC: 102 MMOL/L — SIGNIFICANT CHANGE UP (ref 96–108)
CHLORIDE SERPL-SCNC: 104 MMOL/L — SIGNIFICANT CHANGE UP (ref 96–108)
CHLORIDE SERPL-SCNC: 104 MMOL/L — SIGNIFICANT CHANGE UP (ref 96–108)
CO2 SERPL-SCNC: 24 MMOL/L — SIGNIFICANT CHANGE UP (ref 22–29)
CO2 SERPL-SCNC: 24 MMOL/L — SIGNIFICANT CHANGE UP (ref 22–29)
CO2 SERPL-SCNC: 28 MMOL/L — SIGNIFICANT CHANGE UP (ref 22–29)
CO2 SERPL-SCNC: 28 MMOL/L — SIGNIFICANT CHANGE UP (ref 22–29)
CREAT SERPL-MCNC: 0.47 MG/DL — LOW (ref 0.5–1.3)
CREAT SERPL-MCNC: 0.47 MG/DL — LOW (ref 0.5–1.3)
CREAT SERPL-MCNC: 0.53 MG/DL — SIGNIFICANT CHANGE UP (ref 0.5–1.3)
CREAT SERPL-MCNC: 0.53 MG/DL — SIGNIFICANT CHANGE UP (ref 0.5–1.3)
EGFR: 100 ML/MIN/1.73M2 — SIGNIFICANT CHANGE UP
EGFR: 100 ML/MIN/1.73M2 — SIGNIFICANT CHANGE UP
EGFR: 104 ML/MIN/1.73M2 — SIGNIFICANT CHANGE UP
EGFR: 104 ML/MIN/1.73M2 — SIGNIFICANT CHANGE UP
EOSINOPHIL # BLD AUTO: 0.27 K/UL — SIGNIFICANT CHANGE UP (ref 0–0.5)
EOSINOPHIL # BLD AUTO: 0.27 K/UL — SIGNIFICANT CHANGE UP (ref 0–0.5)
EOSINOPHIL NFR BLD AUTO: 1.9 % — SIGNIFICANT CHANGE UP (ref 0–6)
EOSINOPHIL NFR BLD AUTO: 1.9 % — SIGNIFICANT CHANGE UP (ref 0–6)
GLUCOSE SERPL-MCNC: 115 MG/DL — HIGH (ref 70–99)
GLUCOSE SERPL-MCNC: 115 MG/DL — HIGH (ref 70–99)
GLUCOSE SERPL-MCNC: 166 MG/DL — HIGH (ref 70–99)
GLUCOSE SERPL-MCNC: 166 MG/DL — HIGH (ref 70–99)
HCT VFR BLD CALC: 30 % — LOW (ref 39–50)
HCT VFR BLD CALC: 30 % — LOW (ref 39–50)
HGB BLD-MCNC: 10.3 G/DL — LOW (ref 13–17)
HGB BLD-MCNC: 10.3 G/DL — LOW (ref 13–17)
IMM GRANULOCYTES NFR BLD AUTO: 0.5 % — SIGNIFICANT CHANGE UP (ref 0–0.9)
IMM GRANULOCYTES NFR BLD AUTO: 0.5 % — SIGNIFICANT CHANGE UP (ref 0–0.9)
LDH SERPL L TO P-CCNC: 912 U/L — HIGH (ref 98–192)
LDH SERPL L TO P-CCNC: 912 U/L — HIGH (ref 98–192)
LYMPHOCYTES # BLD AUTO: 19.8 % — SIGNIFICANT CHANGE UP (ref 13–44)
LYMPHOCYTES # BLD AUTO: 19.8 % — SIGNIFICANT CHANGE UP (ref 13–44)
LYMPHOCYTES # BLD AUTO: 2.78 K/UL — SIGNIFICANT CHANGE UP (ref 1–3.3)
LYMPHOCYTES # BLD AUTO: 2.78 K/UL — SIGNIFICANT CHANGE UP (ref 1–3.3)
MCHC RBC-ENTMCNC: 32 PG — SIGNIFICANT CHANGE UP (ref 27–34)
MCHC RBC-ENTMCNC: 32 PG — SIGNIFICANT CHANGE UP (ref 27–34)
MCHC RBC-ENTMCNC: 34.3 GM/DL — SIGNIFICANT CHANGE UP (ref 32–36)
MCHC RBC-ENTMCNC: 34.3 GM/DL — SIGNIFICANT CHANGE UP (ref 32–36)
MCV RBC AUTO: 93.2 FL — SIGNIFICANT CHANGE UP (ref 80–100)
MCV RBC AUTO: 93.2 FL — SIGNIFICANT CHANGE UP (ref 80–100)
MITOCHONDRIA AB SER-ACNC: SIGNIFICANT CHANGE UP
MITOCHONDRIA AB SER-ACNC: SIGNIFICANT CHANGE UP
MONOCYTES # BLD AUTO: 1.17 K/UL — HIGH (ref 0–0.9)
MONOCYTES # BLD AUTO: 1.17 K/UL — HIGH (ref 0–0.9)
MONOCYTES NFR BLD AUTO: 8.3 % — SIGNIFICANT CHANGE UP (ref 2–14)
MONOCYTES NFR BLD AUTO: 8.3 % — SIGNIFICANT CHANGE UP (ref 2–14)
MYOGLOBIN SERPL-MCNC: 4900 NG/ML — HIGH (ref 28–72)
MYOGLOBIN SERPL-MCNC: 4900 NG/ML — HIGH (ref 28–72)
NEUTROPHILS # BLD AUTO: 9.67 K/UL — HIGH (ref 1.8–7.4)
NEUTROPHILS # BLD AUTO: 9.67 K/UL — HIGH (ref 1.8–7.4)
NEUTROPHILS NFR BLD AUTO: 68.8 % — SIGNIFICANT CHANGE UP (ref 43–77)
NEUTROPHILS NFR BLD AUTO: 68.8 % — SIGNIFICANT CHANGE UP (ref 43–77)
PLATELET # BLD AUTO: 350 K/UL — SIGNIFICANT CHANGE UP (ref 150–400)
PLATELET # BLD AUTO: 350 K/UL — SIGNIFICANT CHANGE UP (ref 150–400)
POTASSIUM SERPL-MCNC: 3.1 MMOL/L — LOW (ref 3.5–5.3)
POTASSIUM SERPL-MCNC: 3.1 MMOL/L — LOW (ref 3.5–5.3)
POTASSIUM SERPL-MCNC: 4.6 MMOL/L — SIGNIFICANT CHANGE UP (ref 3.5–5.3)
POTASSIUM SERPL-MCNC: 4.6 MMOL/L — SIGNIFICANT CHANGE UP (ref 3.5–5.3)
POTASSIUM SERPL-SCNC: 3.1 MMOL/L — LOW (ref 3.5–5.3)
POTASSIUM SERPL-SCNC: 3.1 MMOL/L — LOW (ref 3.5–5.3)
POTASSIUM SERPL-SCNC: 4.6 MMOL/L — SIGNIFICANT CHANGE UP (ref 3.5–5.3)
POTASSIUM SERPL-SCNC: 4.6 MMOL/L — SIGNIFICANT CHANGE UP (ref 3.5–5.3)
PROT SERPL-MCNC: 5 G/DL — LOW (ref 6.6–8.7)
PROT SERPL-MCNC: 5 G/DL — LOW (ref 6.6–8.7)
RBC # BLD: 3.22 M/UL — LOW (ref 4.2–5.8)
RBC # BLD: 3.22 M/UL — LOW (ref 4.2–5.8)
RBC # FLD: 13.8 % — SIGNIFICANT CHANGE UP (ref 10.3–14.5)
RBC # FLD: 13.8 % — SIGNIFICANT CHANGE UP (ref 10.3–14.5)
SODIUM SERPL-SCNC: 140 MMOL/L — SIGNIFICANT CHANGE UP (ref 135–145)
SODIUM SERPL-SCNC: 140 MMOL/L — SIGNIFICANT CHANGE UP (ref 135–145)
SODIUM SERPL-SCNC: 141 MMOL/L — SIGNIFICANT CHANGE UP (ref 135–145)
SODIUM SERPL-SCNC: 141 MMOL/L — SIGNIFICANT CHANGE UP (ref 135–145)
SSA-52 (RO52) (ENA) ANTIBODY, IGG: 9 AU/ML — SIGNIFICANT CHANGE UP (ref 0–40)
SSA-52 (RO52) (ENA) ANTIBODY, IGG: 9 AU/ML — SIGNIFICANT CHANGE UP (ref 0–40)
SSA-60 (RO60) (ENA) ANTIBODY, IGG: 0 AU/ML — SIGNIFICANT CHANGE UP (ref 0–40)
SSA-60 (RO60) (ENA) ANTIBODY, IGG: 0 AU/ML — SIGNIFICANT CHANGE UP (ref 0–40)
WBC # BLD: 14.06 K/UL — HIGH (ref 3.8–10.5)
WBC # BLD: 14.06 K/UL — HIGH (ref 3.8–10.5)
WBC # FLD AUTO: 14.06 K/UL — HIGH (ref 3.8–10.5)
WBC # FLD AUTO: 14.06 K/UL — HIGH (ref 3.8–10.5)

## 2023-12-04 PROCEDURE — 99223 1ST HOSP IP/OBS HIGH 75: CPT

## 2023-12-04 PROCEDURE — 99232 SBSQ HOSP IP/OBS MODERATE 35: CPT

## 2023-12-04 PROCEDURE — 99233 SBSQ HOSP IP/OBS HIGH 50: CPT

## 2023-12-04 RX ORDER — POTASSIUM CHLORIDE 20 MEQ
40 PACKET (EA) ORAL EVERY 4 HOURS
Refills: 0 | Status: COMPLETED | OUTPATIENT
Start: 2023-12-04 | End: 2023-12-04

## 2023-12-04 RX ADMIN — ENOXAPARIN SODIUM 40 MILLIGRAM(S): 100 INJECTION SUBCUTANEOUS at 06:04

## 2023-12-04 RX ADMIN — Medication 120 MEQ/KG/HR: at 22:25

## 2023-12-04 RX ADMIN — Medication 60 MILLIGRAM(S): at 06:05

## 2023-12-04 RX ADMIN — PREGABALIN 1000 MICROGRAM(S): 225 CAPSULE ORAL at 12:44

## 2023-12-04 RX ADMIN — Medication 120 MEQ/KG/HR: at 12:43

## 2023-12-04 RX ADMIN — PANTOPRAZOLE SODIUM 40 MILLIGRAM(S): 20 TABLET, DELAYED RELEASE ORAL at 06:05

## 2023-12-04 RX ADMIN — Medication 40 MILLIEQUIVALENT(S): at 06:05

## 2023-12-04 RX ADMIN — Medication 1 MILLIGRAM(S): at 12:44

## 2023-12-04 RX ADMIN — Medication 120 MEQ/KG/HR: at 16:34

## 2023-12-04 RX ADMIN — Medication 40 MILLIEQUIVALENT(S): at 09:18

## 2023-12-04 NOTE — PROGRESS NOTE ADULT - ASSESSMENT
The patient is a 83y Male who is followed by neurology because of weakness, proximal>distal for about 3 weeks, painless with elevated muscle enzymes (rhabdo) and myoglobinuria.  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     - myoglobin and LDH trending down with steroids    - need to follow serial CPKs  Given overall picture this does not appear to be consistent with stroke or spine disease (cord compression or myelopathy)  PT/OT eval/treat  Watch renal function given rhabdomyolysis, may need higher bicarb solution - stable BUN/Cr 14.4/0.53    MRI RUE pending    will follow with you    Piyush George MD PhD   [id number] The patient is a 83y Male who is followed by neurology because of weakness, proximal>distal for about 3 weeks, painless with elevated muscle enzymes (rhabdo) and myoglobinuria.  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     - myoglobin and LDH trending down with steroids    - need to follow serial CPKs  Given overall picture this does not appear to be consistent with stroke or spine disease (cord compression or myelopathy)  PT/OT eval/treat  Watch renal function given rhabdomyolysis, may need higher bicarb solution - stable BUN/Cr 14.4/0.53    MRI RUE pending    will follow with you    Piyush George MD PhD   [id number]

## 2023-12-04 NOTE — PROGRESS NOTE ADULT - SUBJECTIVE AND OBJECTIVE BOX
[facility]                                     Neurology at [place]                                 Doris Coker, & Dar                                  [address]                                        [place], [place], [address]                                             [phone]    CC: weakness  HPI: The patient is a 83y Male who presented with b/l proximal weakness for 3 weeks.  It started in the right arm and proceeded to effect left arm and then legs.  He is unable to raise arms to brush hair or arise from seated position without using hands to push up.  He says it started suddenly 3 weeks ago.  He also noted his urine being dark at roughly the same time.  He has no family history of weakness to suggest a muscular dystrophy and has no physical features of myotonic dystrophy.  he was at [facility] about  a week ago with CPK about 15,000 but apparently signed out AMA prior to evaluation  he returned yesterday because he was not getting better.  Neurology is asked to evaluate.    Interval history: no neuro events, remains with proximal weakness of 4 extremities, has neck soreness    Review of systems (neurology): Denies headache or dizziness. (+) weakness, no numbness.  Denies speech/language deficits. Denies diplopia/blurred vision.  Denies confusion    MEDICATIONS  (STANDING):  cyanocobalamin 1000 MICROGram(s) Oral daily  enoxaparin Injectable 40 milliGRAM(s) SubCutaneous every 24 hours  folic acid 1 milliGRAM(s) Oral daily  pantoprazole    Tablet 40 milliGRAM(s) Oral before breakfast  predniSONE   Tablet 60 milliGRAM(s) Oral daily  sodium bicarbonate  Infusion 0.234 mEq/kG/Hr (120 mL/Hr) IV Continuous <Continuous>    MEDICATIONS  (PRN):    Vital Signs Last 24 Hrs  T(C): 36.7 (04 Dec 2023 11:27), Max: 37.1 (03 Dec 2023 18:02)  T(F): 98 (04 Dec 2023 11:27), Max: 98.8 (03 Dec 2023 18:02)  HR: 70 (04 Dec 2023 11:27) (64 - 71)  BP: 147/71 (04 Dec 2023 11:27) (114/57 - 147/71)  BP(mean): 81 (03 Dec 2023 18:02) (81 - 81)  RR: 17 (04 Dec 2023 11:27) (16 - 17)  SpO2: 96% (04 Dec 2023 11:27) (93% - 96%)    Parameters below as of 04 Dec 2023 11:27  Patient On (Oxygen Delivery Method): room air    Detailed Neurologic Exam:    Mental status: The patient is awake and alert and has normal attention span.  The patient is fully oriented in 3 spheres. The patient is oriented to current events. The patient is able to name objects, follow commands, repeat sentences.    Cranial nerves: Pupils equal and react symmetrically to light. There is no visual field deficit to confrontation. Extraocular motion is full with no nystagmus. There is no ptosis. Facial sensation is intact. Facial musculature is symmetric. Palate elevates symmetrically. Tongue is midline.    Motor: There is normal bulk and tone.  There is no tremor.  Strength is 1-2/5 in the right deltoid unable to abduct well  has 4+/5 distal power in arm. Has 2/5 IPS better (4+/5) in distal right leg.   Strength is 1-2/5 in the left deltoid unable to abduct well  has 4+/5 distal power in arm. Has 1-2/5 IPS better (4+/5) in distal leftleg.     Sensation: Intact to light touch and pin in 4 extremities    Reflexes: 2+ patella and biceps and plantar responses are flexor.    Cerebellar: There is no dysmetria on finger to nose testing given limitations of shoulder weakness.    Gait : deferred    LABS:                                    10.3   14.06 )-----------( 350      ( 04 Dec 2023 03:06 )             30.0     12-04    141  |  102  |  14.4  ----------------------------<  166<H>  4.6   |  28.0  |  0.53    Ca    8.2<L>      04 Dec 2023 10:35    TPro  5.0<L>  /  Alb  2.4<L>  /  TBili  0.3<L>  /  DBili  x   /  AST  506<H>  /  ALT  478<H>  /  AlkPhos  116  12-04    LIVER FUNCTIONS - ( 04 Dec 2023 03:06 )  Alb: 2.4 g/dL / Pro: 5.0 g/dL / ALK PHOS: 116 U/L / ALT: 478 U/L / AST: 506 U/L / GGT: x           MOSES-1 Antibody (12.02.23 @ 16:38)   MOSES-1 Antibody: <0.2: Fluorescent Bead Immunoassay   Sedimentation Rate, Erythrocyte (12.02.23 @ 16:38)   Sedimentation Rate, Erythrocyte: 53:   C-Reactive Protein, Serum (12.02.23 @ 16:38)   C-Reactive Protein, Serum: 8 mg/L  Creatine Kinase, Serum in AM (12.03.23 @ 03:33)   Creatine Kinase, Serum: 51642:  CKMB Units (12.03.23 @ 03:33)   CKMB Units: 169.6 ng/mLHepatic Function Panel in AM (12.03.23 @ 03:33)   Myoglobin (12.04.23 @ 03:06)   Myoglobin: 4900 ng/mL  Myoglobin (12.02.23 @ 16:38)   Myoglobin: 6780 ng/mL  Lactate Dehydrogenase, Serum in AM (12.04.23 @ 03:06)   Lactate Dehydrogenase, Serum: 912 U/L  Lactate Dehydrogenase, Serum (12.02.23 @ 16:38)   Lactate Dehydrogenase, Serum: 1208 U/L  Indirect Reacting Bilirubin: 0.3 mg/dL  Protein Total: 5.6 g/dL  Albumin: 2.9 g/dL  Bilirubin Total: 0.4 mg/dL  Bilirubin Direct: 0.1 mg/dL  Alkaline Phosphatase: 136 U/L  Aspartate Aminotransferase (AST/SGOT): 600 U/L  Alanine Aminotransferase (ALT/SGPT): 522 U/L  Gamma Glutamyl Transferase, Serum (12.02.23 @ 16:38)   Gamma Glutamyl Transferase, Serum: 29 U/L  Transferrin, Serum (12.02.23 @ 16:38)   Transferrin, Serum: 193 mg/dL  Ferritin (12.02.23 @ 16:38)   Rheumatoid Factor Quant, Serum or Plasma (12.02.23 @ 16:38)   Rheumatoid Factor Quant, Serum or Plasma: <10: Test Repeated IU/mLFerritin: 620 ng/mLVitamin  Iron with Total Binding Capacity (12.02.23 @ 16:38)   Iron - Total Binding Capacity.: 276 ug/dL  % Saturation, Iron: 13 %  Iron Total: 36 ug/dL   B12, Serum (12.02.23 @ 16:38)   Vitamin B12, Serum: 340 pg/mL  Rheumatoid Factor Quant, Serum or Plasma (12.02.23 @ 16:38)   Rheumatoid Factor Quant, Serum or Plasma: <10: Test Repeated IU/mL  Urinalysis + Microscopic Examination (12.02.23 @ 15:00)   pH Urine: 5.5  Urine Appearance: Turbid  Color: Dark Yellow  Specific Gravity: 1.027  Protein, Urine: 100 mg/dL  Glucose Qualitative, Urine: Negative mg/dL  Ketone - Urine: Negative mg/dL  Blood, Urine: Large  Bilirubin: Small  Urobilinogen: 1.0 mg/dL  Leukocyte Esterase Concentration: Small  Nitrite: Negative  White Blood Cell - Urine: 1 /HPF  Red Blood Cell - Urine: 272 /HPF  Bacteria: Negative /HPF  Cast: 7 /LPF  Epithelial Cells: 1 /HPF  RADIOLOGY & ADDITIONAL STUDIES (independently reviewed unless otherwise noted):  no neuro studies                                [facility]                                     Neurology at [place]                                 Doris Coker, & Dar                                  [address]                                        [place], [place], [address]                                             [phone]    CC: weakness  HPI: The patient is a 83y Male who presented with b/l proximal weakness for 3 weeks.  It started in the right arm and proceeded to effect left arm and then legs.  He is unable to raise arms to brush hair or arise from seated position without using hands to push up.  He says it started suddenly 3 weeks ago.  He also noted his urine being dark at roughly the same time.  He has no family history of weakness to suggest a muscular dystrophy and has no physical features of myotonic dystrophy.  he was at [facility] about  a week ago with CPK about 15,000 but apparently signed out AMA prior to evaluation  he returned yesterday because he was not getting better.  Neurology is asked to evaluate.    Interval history: no neuro events, remains with proximal weakness of 4 extremities, has neck soreness    Review of systems (neurology): Denies headache or dizziness. (+) weakness, no numbness.  Denies speech/language deficits. Denies diplopia/blurred vision.  Denies confusion    MEDICATIONS  (STANDING):  cyanocobalamin 1000 MICROGram(s) Oral daily  enoxaparin Injectable 40 milliGRAM(s) SubCutaneous every 24 hours  folic acid 1 milliGRAM(s) Oral daily  pantoprazole    Tablet 40 milliGRAM(s) Oral before breakfast  predniSONE   Tablet 60 milliGRAM(s) Oral daily  sodium bicarbonate  Infusion 0.234 mEq/kG/Hr (120 mL/Hr) IV Continuous <Continuous>    MEDICATIONS  (PRN):    Vital Signs Last 24 Hrs  T(C): 36.7 (04 Dec 2023 11:27), Max: 37.1 (03 Dec 2023 18:02)  T(F): 98 (04 Dec 2023 11:27), Max: 98.8 (03 Dec 2023 18:02)  HR: 70 (04 Dec 2023 11:27) (64 - 71)  BP: 147/71 (04 Dec 2023 11:27) (114/57 - 147/71)  BP(mean): 81 (03 Dec 2023 18:02) (81 - 81)  RR: 17 (04 Dec 2023 11:27) (16 - 17)  SpO2: 96% (04 Dec 2023 11:27) (93% - 96%)    Parameters below as of 04 Dec 2023 11:27  Patient On (Oxygen Delivery Method): room air    Detailed Neurologic Exam:    Mental status: The patient is awake and alert and has normal attention span.  The patient is fully oriented in 3 spheres. The patient is oriented to current events. The patient is able to name objects, follow commands, repeat sentences.    Cranial nerves: Pupils equal and react symmetrically to light. There is no visual field deficit to confrontation. Extraocular motion is full with no nystagmus. There is no ptosis. Facial sensation is intact. Facial musculature is symmetric. Palate elevates symmetrically. Tongue is midline.    Motor: There is normal bulk and tone.  There is no tremor.  Strength is 1-2/5 in the right deltoid unable to abduct well  has 4+/5 distal power in arm. Has 2/5 IPS better (4+/5) in distal right leg.   Strength is 1-2/5 in the left deltoid unable to abduct well  has 4+/5 distal power in arm. Has 1-2/5 IPS better (4+/5) in distal leftleg.     Sensation: Intact to light touch and pin in 4 extremities    Reflexes: 2+ patella and biceps and plantar responses are flexor.    Cerebellar: There is no dysmetria on finger to nose testing given limitations of shoulder weakness.    Gait : deferred    LABS:                                    10.3   14.06 )-----------( 350      ( 04 Dec 2023 03:06 )             30.0     12-04    141  |  102  |  14.4  ----------------------------<  166<H>  4.6   |  28.0  |  0.53    Ca    8.2<L>      04 Dec 2023 10:35    TPro  5.0<L>  /  Alb  2.4<L>  /  TBili  0.3<L>  /  DBili  x   /  AST  506<H>  /  ALT  478<H>  /  AlkPhos  116  12-04    LIVER FUNCTIONS - ( 04 Dec 2023 03:06 )  Alb: 2.4 g/dL / Pro: 5.0 g/dL / ALK PHOS: 116 U/L / ALT: 478 U/L / AST: 506 U/L / GGT: x           MOSES-1 Antibody (12.02.23 @ 16:38)   MOSES-1 Antibody: <0.2: Fluorescent Bead Immunoassay   Sedimentation Rate, Erythrocyte (12.02.23 @ 16:38)   Sedimentation Rate, Erythrocyte: 53:   C-Reactive Protein, Serum (12.02.23 @ 16:38)   C-Reactive Protein, Serum: 8 mg/L  Creatine Kinase, Serum in AM (12.03.23 @ 03:33)   Creatine Kinase, Serum: 68471:  CKMB Units (12.03.23 @ 03:33)   CKMB Units: 169.6 ng/mLHepatic Function Panel in AM (12.03.23 @ 03:33)   Myoglobin (12.04.23 @ 03:06)   Myoglobin: 4900 ng/mL  Myoglobin (12.02.23 @ 16:38)   Myoglobin: 6780 ng/mL  Lactate Dehydrogenase, Serum in AM (12.04.23 @ 03:06)   Lactate Dehydrogenase, Serum: 912 U/L  Lactate Dehydrogenase, Serum (12.02.23 @ 16:38)   Lactate Dehydrogenase, Serum: 1208 U/L  Indirect Reacting Bilirubin: 0.3 mg/dL  Protein Total: 5.6 g/dL  Albumin: 2.9 g/dL  Bilirubin Total: 0.4 mg/dL  Bilirubin Direct: 0.1 mg/dL  Alkaline Phosphatase: 136 U/L  Aspartate Aminotransferase (AST/SGOT): 600 U/L  Alanine Aminotransferase (ALT/SGPT): 522 U/L  Gamma Glutamyl Transferase, Serum (12.02.23 @ 16:38)   Gamma Glutamyl Transferase, Serum: 29 U/L  Transferrin, Serum (12.02.23 @ 16:38)   Transferrin, Serum: 193 mg/dL  Ferritin (12.02.23 @ 16:38)   Rheumatoid Factor Quant, Serum or Plasma (12.02.23 @ 16:38)   Rheumatoid Factor Quant, Serum or Plasma: <10: Test Repeated IU/mLFerritin: 620 ng/mLVitamin  Iron with Total Binding Capacity (12.02.23 @ 16:38)   Iron - Total Binding Capacity.: 276 ug/dL  % Saturation, Iron: 13 %  Iron Total: 36 ug/dL   B12, Serum (12.02.23 @ 16:38)   Vitamin B12, Serum: 340 pg/mL  Rheumatoid Factor Quant, Serum or Plasma (12.02.23 @ 16:38)   Rheumatoid Factor Quant, Serum or Plasma: <10: Test Repeated IU/mL  Urinalysis + Microscopic Examination (12.02.23 @ 15:00)   pH Urine: 5.5  Urine Appearance: Turbid  Color: Dark Yellow  Specific Gravity: 1.027  Protein, Urine: 100 mg/dL  Glucose Qualitative, Urine: Negative mg/dL  Ketone - Urine: Negative mg/dL  Blood, Urine: Large  Bilirubin: Small  Urobilinogen: 1.0 mg/dL  Leukocyte Esterase Concentration: Small  Nitrite: Negative  White Blood Cell - Urine: 1 /HPF  Red Blood Cell - Urine: 272 /HPF  Bacteria: Negative /HPF  Cast: 7 /LPF  Epithelial Cells: 1 /HPF  RADIOLOGY & ADDITIONAL STUDIES (independently reviewed unless otherwise noted):  no neuro studies

## 2023-12-04 NOTE — CONSULT NOTE ADULT - SUBJECTIVE AND OBJECTIVE BOX
***Note is Preliminary and Not Complete***                                          [facility] Rheumatology [facility]                                                 Dr. Иван Jefferson MD                                   [address] [place] [address], [place], [place]  ---------------------------------------------------------------------------------------------------------    HISTORY OF PRESENT ILLNESS:  84 y/o male w/ HTN, HLD.   Patient presented to [facility] for progressive b/l proximal UE and LE weakness x 3 weeks.   Pt was on Lipitor but it was stopped 1 week prior to current hospitalization.     Labwork in hospital with:   CPK 15.3k -> 10.9k   AST, ALT elevated (secondary to muscle damage, likely unrelated to liver function)   WBC 12s, ESR 30s-50s, CRP 7-8, LDH 1.3k   Normal/neg GGT, RF, Moses-1, TSH, PTH, Hep B/C   CXR normal     PAST MEDICAL & SURGICAL HISTORY:  HLD (hyperlipidemia)  Hypertension    REVIEW OF SYSTEMS:  As per HPI    MEDICATIONS  (STANDING):  cyanocobalamin 1000 MICROGram(s) Oral daily  enoxaparin Injectable 40 milliGRAM(s) SubCutaneous every 24 hours  folic acid 1 milliGRAM(s) Oral daily  pantoprazole    Tablet 40 milliGRAM(s) Oral before breakfast  predniSONE   Tablet 60 milliGRAM(s) Oral daily  sodium bicarbonate  Infusion 0.234 mEq/kG/Hr (120 mL/Hr) IV Continuous <Continuous>    MEDICATIONS  (PRN):    Allergies  No Known Allergies  Intolerances    FAMILY HISTORY:  No pertinent family history in first degree relatives    SOCIAL HISTORY:  Denies EtOH, smoking    Vital Signs Last 24 Hrs  T(C): 36.6 (04 Dec 2023 16:04), Max: 37.1 (03 Dec 2023 18:02)  T(F): 97.8 (04 Dec 2023 16:04), Max: 98.8 (03 Dec 2023 18:02)  HR: 66 (04 Dec 2023 16:04) (64 - 71)  BP: 135/79 (04 Dec 2023 16:04) (114/57 - 147/71)  BP(mean): 81 (03 Dec 2023 18:02) (81 - 81)  RR: 17 (04 Dec 2023 16:04) (16 - 17)  SpO2: 93% (04 Dec 2023 16:04) (93% - 96%)    Parameters below as of 04 Dec 2023 11:27  Patient On (Oxygen Delivery Method): room air    Daily     Daily     PHYSICAL EXAM:  Constitutional: Appears in no acute distress.  HEENT: EOMI, PERRL. No conjunctival erythema. Moist mucous membranes. No nasopharyngeal ulcers.  Neck: Supple, no cervical lymphadenopathy, no thyromegaly.  Cardiovascular: RRR. S1, S2 auscultated. No murmurs or rubs.  Respiratory: Clear to auscultation bilaterally. No wheezes, rales, or crackles.  Gastrointestinal: Soft, nontender, nondistended. Bowel sounds present. No organomegaly.  MSK: No active joint swelling, erythema, or warmth. No joint tenderness to palpation. No joint deformities. Normal ROM of neck, back, b/l upper and lower extremities.  Skin: No rashes or lesions.  Neuro: No focal deficits. Motor strength 5/5 in major muscle groups of b/l UE and LE.  Psych: AAOx3. Normal affect and thought process.    LABS:                        10.3   14.06 )-----------( 350      ( 04 Dec 2023 03:06 )             30.0     12-04    141  |  102  |  14.4  ----------------------------<  166<H>  4.6   |  28.0  |  0.53    Ca    8.2<L>      04 Dec 2023 10:35    TPro  5.0<L>  /  Alb  2.4<L>  /  TBili  0.3<L>  /  DBili  x   /  AST  506<H>  /  ALT  478<H>  /  AlkPhos  116  12-04      Urinalysis Basic - ( 04 Dec 2023 10:35 )    Color: x / Appearance: x / SG: x / pH: x  Gluc: 166 mg/dL / Ketone: x  / Bili: x / Urobili: x   Blood: x / Protein: x / Nitrite: x   Leuk Esterase: x / RBC: x / WBC x   Sq Epi: x / Non Sq Epi: x / Bacteria: x      Lactate Dehydrogenase, Serum: 912 U/L *H* [98 - 192] (12-04-23 @ 03:06)  Creatine Kinase, Serum: 56807 U/L *HH* [30 - 200] (12-03-23 @ 03:33)  Angiotensin Converting Enzyme, Serum: 16 U/L [14 - 82] (12-02-23 @ 20:38)  Hepatitis B Surface Antibody: Nonreact (12-02-23 @ 20:38)  Culture Results:   No growth at 24 hours (12-02-23 @ 16:51)  MOSES-1 Antibody: <0.2 AI (12-02-23 @ 16:38)  Rheumatoid Factor Quant, Serum or Plasma: <10 IU/mL [0 - 13] (12-02-23 @ 16:38)  Lactate Dehydrogenase, Serum: 1208 U/L *H* [98 - 192] (12-02-23 @ 16:38)  Hepatitis C Virus Interpretation: Nonreact (12-02-23 @ 16:38)  Hepatitis B Core IgM Antibody: Nonreact (12-02-23 @ 16:38)  Hepatitis B Surface Antigen: Nonreact (12-02-23 @ 16:38)  C-Reactive Protein, Serum: 8 mg/L *H* (12-02-23 @ 16:38)  Sedimentation Rate, Erythrocyte: 53 mm/hr *H* [0 - 15] (12-02-23 @ 16:38)  Culture Results:   No growth at 24 hours (12-02-23 @ 16:38)  Vitamin D, 25-Hydroxy: 32.2 ng/mL [30.0 - 80.0] (12-02-23 @ 16:38)  Protein, Urine: 100 mg/dL *!* (12-02-23 @ 15:00)  Culture Results:   <10,000 CFU/mL Normal Urogenital Heidi (12-02-23 @ 15:00)  Creatine Kinase, Serum: 66958 U/L *HH* [30 - 200] (12-02-23 @ 12:32)  C-Reactive Protein, Serum: 7 mg/L *H* (12-02-23 @ 12:32)  Sedimentation Rate, Erythrocyte: 34 mm/hr *H* [0 - 15] (12-02-23 @ 12:32)  Lactate Dehydrogenase, Serum: 1285 U/L *H* [98 - 192] (12-02-23 @ 12:32)      RADIOLOGY & ADDITIONAL STUDIES:  < from: Xray Chest 1 View-PORTABLE IMMEDIATE (Xray Chest 1 View-PORTABLE IMMEDIATE .) (12.03.23 @ 09:31) >  FINDINGS/  IMPRESSION: Heart size, mediastinal and hilar contours are unchanged and   within normal limits. There may be new mild pulmonary venous congestion.   No large effusions or pneumothorax. Degenerative changes involving both   glenohumeral joints.    ASSESSMENT/PLANS:          Patient has acute onset of worsening b/l proximal UE and LE weakness.     - Pending lab workup for inflammatory myositis   - Pending MRI of RUE for signs of active inflammatory arthritis. 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   - Continue treatment for rhabdomyolysis as per parimary team.  - Please contact us when biopsy results for further taper of prednisone and any next steps.   PLANS:   ***Note is Preliminary and Not Complete***                                          [facility] Rheumatology [facility]                                                 Dr. Иван Jefferson MD                                   [address] [place] [address], [place], [place]  ---------------------------------------------------------------------------------------------------------    HISTORY OF PRESENT ILLNESS:  82 y/o male w/ HTN, HLD.   Patient presented to [facility] for progressive b/l proximal UE and LE weakness x 3 weeks.   Pt was on Lipitor but it was stopped 1 week prior to current hospitalization.     Labwork in hospital with:   CPK 15.3k -> 10.9k   AST, ALT elevated (secondary to muscle damage, likely unrelated to liver function)   WBC 12s, ESR 30s-50s, CRP 7-8, LDH 1.3k   Normal/neg GGT, RF, Moses-1, TSH, PTH, Hep B/C   CXR normal     PAST MEDICAL & SURGICAL HISTORY:  HLD (hyperlipidemia)  Hypertension    REVIEW OF SYSTEMS:  As per HPI    MEDICATIONS  (STANDING):  cyanocobalamin 1000 MICROGram(s) Oral daily  enoxaparin Injectable 40 milliGRAM(s) SubCutaneous every 24 hours  folic acid 1 milliGRAM(s) Oral daily  pantoprazole    Tablet 40 milliGRAM(s) Oral before breakfast  predniSONE   Tablet 60 milliGRAM(s) Oral daily  sodium bicarbonate  Infusion 0.234 mEq/kG/Hr (120 mL/Hr) IV Continuous <Continuous>    MEDICATIONS  (PRN):    Allergies  No Known Allergies  Intolerances    FAMILY HISTORY:  No pertinent family history in first degree relatives    SOCIAL HISTORY:  Denies EtOH, smoking    Vital Signs Last 24 Hrs  T(C): 36.6 (04 Dec 2023 16:04), Max: 37.1 (03 Dec 2023 18:02)  T(F): 97.8 (04 Dec 2023 16:04), Max: 98.8 (03 Dec 2023 18:02)  HR: 66 (04 Dec 2023 16:04) (64 - 71)  BP: 135/79 (04 Dec 2023 16:04) (114/57 - 147/71)  BP(mean): 81 (03 Dec 2023 18:02) (81 - 81)  RR: 17 (04 Dec 2023 16:04) (16 - 17)  SpO2: 93% (04 Dec 2023 16:04) (93% - 96%)    Parameters below as of 04 Dec 2023 11:27  Patient On (Oxygen Delivery Method): room air    Daily     Daily     PHYSICAL EXAM:  Constitutional: Appears in no acute distress.  HEENT: EOMI, PERRL. No conjunctival erythema. Moist mucous membranes. No nasopharyngeal ulcers.  Neck: Supple, no cervical lymphadenopathy, no thyromegaly.  Cardiovascular: RRR. S1, S2 auscultated. No murmurs or rubs.  Respiratory: Clear to auscultation bilaterally. No wheezes, rales, or crackles.  Gastrointestinal: Soft, nontender, nondistended. Bowel sounds present. No organomegaly.  MSK: No active joint swelling, erythema, or warmth. No joint tenderness to palpation. No joint deformities. Normal ROM of neck, back, b/l upper and lower extremities.  Skin: No rashes or lesions.  Neuro: No focal deficits. Motor strength 5/5 in major muscle groups of b/l UE and LE.  Psych: AAOx3. Normal affect and thought process.    LABS:                        10.3   14.06 )-----------( 350      ( 04 Dec 2023 03:06 )             30.0     12-04    141  |  102  |  14.4  ----------------------------<  166<H>  4.6   |  28.0  |  0.53    Ca    8.2<L>      04 Dec 2023 10:35    TPro  5.0<L>  /  Alb  2.4<L>  /  TBili  0.3<L>  /  DBili  x   /  AST  506<H>  /  ALT  478<H>  /  AlkPhos  116  12-04      Urinalysis Basic - ( 04 Dec 2023 10:35 )    Color: x / Appearance: x / SG: x / pH: x  Gluc: 166 mg/dL / Ketone: x  / Bili: x / Urobili: x   Blood: x / Protein: x / Nitrite: x   Leuk Esterase: x / RBC: x / WBC x   Sq Epi: x / Non Sq Epi: x / Bacteria: x      Lactate Dehydrogenase, Serum: 912 U/L *H* [98 - 192] (12-04-23 @ 03:06)  Creatine Kinase, Serum: 42715 U/L *HH* [30 - 200] (12-03-23 @ 03:33)  Angiotensin Converting Enzyme, Serum: 16 U/L [14 - 82] (12-02-23 @ 20:38)  Hepatitis B Surface Antibody: Nonreact (12-02-23 @ 20:38)  Culture Results:   No growth at 24 hours (12-02-23 @ 16:51)  MOSES-1 Antibody: <0.2 AI (12-02-23 @ 16:38)  Rheumatoid Factor Quant, Serum or Plasma: <10 IU/mL [0 - 13] (12-02-23 @ 16:38)  Lactate Dehydrogenase, Serum: 1208 U/L *H* [98 - 192] (12-02-23 @ 16:38)  Hepatitis C Virus Interpretation: Nonreact (12-02-23 @ 16:38)  Hepatitis B Core IgM Antibody: Nonreact (12-02-23 @ 16:38)  Hepatitis B Surface Antigen: Nonreact (12-02-23 @ 16:38)  C-Reactive Protein, Serum: 8 mg/L *H* (12-02-23 @ 16:38)  Sedimentation Rate, Erythrocyte: 53 mm/hr *H* [0 - 15] (12-02-23 @ 16:38)  Culture Results:   No growth at 24 hours (12-02-23 @ 16:38)  Vitamin D, 25-Hydroxy: 32.2 ng/mL [30.0 - 80.0] (12-02-23 @ 16:38)  Protein, Urine: 100 mg/dL *!* (12-02-23 @ 15:00)  Culture Results:   <10,000 CFU/mL Normal Urogenital Heidi (12-02-23 @ 15:00)  Creatine Kinase, Serum: 61202 U/L *HH* [30 - 200] (12-02-23 @ 12:32)  C-Reactive Protein, Serum: 7 mg/L *H* (12-02-23 @ 12:32)  Sedimentation Rate, Erythrocyte: 34 mm/hr *H* [0 - 15] (12-02-23 @ 12:32)  Lactate Dehydrogenase, Serum: 1285 U/L *H* [98 - 192] (12-02-23 @ 12:32)      RADIOLOGY & ADDITIONAL STUDIES:  < from: Xray Chest 1 View-PORTABLE IMMEDIATE (Xray Chest 1 View-PORTABLE IMMEDIATE .) (12.03.23 @ 09:31) >  FINDINGS/  IMPRESSION: Heart size, mediastinal and hilar contours are unchanged and   within normal limits. There may be new mild pulmonary venous congestion.   No large effusions or pneumothorax. Degenerative changes involving both   glenohumeral joints.    ASSESSMENT/PLANS:          Patient has acute onset of worsening b/l proximal UE and LE weakness.     - Pending lab workup for inflammatory myositis   - Pending MRI of RUE for signs of active inflammatory arthritis. 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   - Continue treatment for rhabdomyolysis as per parimary team.  - Please contact us when biopsy results for further taper of prednisone and any next steps.   PLANS:                                           [facility] Rheumat[facility]                                                 Dr. Иван Jefferson MD                                   [address] [place] [address], [place], [place]  ---------------------------------------------------------------------------------------------------------    HISTORY OF PRESENT ILLNESS:  82 y/o male w/ HTN, HLD.   Patient presented to [facility] for progressive, sudden onset b/l proximal UE and LE weakness x 3 weeks.  Pt was on Lipitor but it was stopped 1 week prior to current hospitalization.    Denies joint pains, swelling, redness, dysphagia, Raynaud's, SOB, cough, rash, calcinosis.  ROS negative unless otherwise stated above.    Labwork in hospital with:   CPK 15.3k -> 10.9k   AST, ALT elevated (secondary to muscle damage, likely unrelated to liver function)   WBC 12s, ESR 30s-50s, CRP 7-8, LDH 1.3k   Normal/neg GGT, RF, Moses-1, TSH, PTH, Hep B/C   CXR normal     PAST MEDICAL & SURGICAL HISTORY:  HLD (hyperlipidemia)  Hypertension    REVIEW OF SYSTEMS:  As per HPI    MEDICATIONS  (STANDING):  cyanocobalamin 1000 MICROGram(s) Oral daily  enoxaparin Injectable 40 milliGRAM(s) SubCutaneous every 24 hours  folic acid 1 milliGRAM(s) Oral daily  pantoprazole    Tablet 40 milliGRAM(s) Oral before breakfast  predniSONE   Tablet 60 milliGRAM(s) Oral daily  sodium bicarbonate  Infusion 0.234 mEq/kG/Hr (120 mL/Hr) IV Continuous <Continuous>    MEDICATIONS  (PRN):    Allergies  No Known Allergies  Intolerances    FAMILY HISTORY:  No pertinent family history in first degree relatives    SOCIAL HISTORY:  Denies EtOH, smoking    Vital Signs Last 24 Hrs  T(C): 36.6 (04 Dec 2023 16:04), Max: 37.1 (03 Dec 2023 18:02)  T(F): 97.8 (04 Dec 2023 16:04), Max: 98.8 (03 Dec 2023 18:02)  HR: 66 (04 Dec 2023 16:04) (64 - 71)  BP: 135/79 (04 Dec 2023 16:04) (114/57 - 147/71)  BP(mean): 81 (03 Dec 2023 18:02) (81 - 81)  RR: 17 (04 Dec 2023 16:04) (16 - 17)  SpO2: 93% (04 Dec 2023 16:04) (93% - 96%)    Parameters below as of 04 Dec 2023 11:27  Patient On (Oxygen Delivery Method): room air    Daily     Daily     PHYSICAL EXAM:  Constitutional: Appears in no acute distress.  HEENT: EOMI, PERRL. No conjunctival erythema. Moist mucous membranes. No nasopharyngeal ulcers.  Neck: Supple, no cervical lymphadenopathy, no thyromegaly.  Cardiovascular: RRR. S1, S2 auscultated. No murmurs or rubs.  Respiratory: Clear to auscultation bilaterally. No wheezes, rales, or crackles.  Gastrointestinal: Soft, nontender, nondistended. Bowel sounds present. No organomegaly.  MSK: No active joint swelling, erythema, or warmth. No joint tenderness to palpation. No joint deformities.  Skin: No rashes or lesions.  Neuro: No focal deficits. Motor strength 3+/5 in b/l upper arms, 5-/5 in b/l thighs, otherwise normal.  Psych: AAOx3. Normal affect and thought process.    LABS:                        10.3   14.06 )-----------( 350      ( 04 Dec 2023 03:06 )             30.0     12-04    141  |  102  |  14.4  ----------------------------<  166<H>  4.6   |  28.0  |  0.53    Ca    8.2<L>      04 Dec 2023 10:35    TPro  5.0<L>  /  Alb  2.4<L>  /  TBili  0.3<L>  /  DBili  x   /  AST  506<H>  /  ALT  478<H>  /  AlkPhos  116  12-04      Urinalysis Basic - ( 04 Dec 2023 10:35 )    Color: x / Appearance: x / SG: x / pH: x  Gluc: 166 mg/dL / Ketone: x  / Bili: x / Urobili: x   Blood: x / Protein: x / Nitrite: x   Leuk Esterase: x / RBC: x / WBC x   Sq Epi: x / Non Sq Epi: x / Bacteria: x      Lactate Dehydrogenase, Serum: 912 U/L *H* [98 - 192] (12-04-23 @ 03:06)  Creatine Kinase, Serum: 78982 U/L *HH* [30 - 200] (12-03-23 @ 03:33)  Angiotensin Converting Enzyme, Serum: 16 U/L [14 - 82] (12-02-23 @ 20:38)  Hepatitis B Surface Antibody: Nonreact (12-02-23 @ 20:38)  Culture Results:   No growth at 24 hours (12-02-23 @ 16:51)  MOSES-1 Antibody: <0.2 AI (12-02-23 @ 16:38)  Rheumatoid Factor Quant, Serum or Plasma: <10 IU/mL [0 - 13] (12-02-23 @ 16:38)  Lactate Dehydrogenase, Serum: 1208 U/L *H* [98 - 192] (12-02-23 @ 16:38)  Hepatitis C Virus Interpretation: Nonreact (12-02-23 @ 16:38)  Hepatitis B Core IgM Antibody: Nonreact (12-02-23 @ 16:38)  Hepatitis B Surface Antigen: Nonreact (12-02-23 @ 16:38)  C-Reactive Protein, Serum: 8 mg/L *H* (12-02-23 @ 16:38)  Sedimentation Rate, Erythrocyte: 53 mm/hr *H* [0 - 15] (12-02-23 @ 16:38)  Culture Results:   No growth at 24 hours (12-02-23 @ 16:38)  Vitamin D, 25-Hydroxy: 32.2 ng/mL [30.0 - 80.0] (12-02-23 @ 16:38)  Protein, Urine: 100 mg/dL *!* (12-02-23 @ 15:00)  Culture Results:   <10,000 CFU/mL Normal Urogenital Heidi (12-02-23 @ 15:00)  Creatine Kinase, Serum: 26531 U/L *HH* [30 - 200] (12-02-23 @ 12:32)  C-Reactive Protein, Serum: 7 mg/L *H* (12-02-23 @ 12:32)  Sedimentation Rate, Erythrocyte: 34 mm/hr *H* [0 - 15] (12-02-23 @ 12:32)  Lactate Dehydrogenase, Serum: 1285 U/L *H* [98 - 192] (12-02-23 @ 12:32)    RADIOLOGY & ADDITIONAL STUDIES:  < from: Xray Chest 1 View-PORTABLE IMMEDIATE (Xray Chest 1 View-PORTABLE IMMEDIATE .) (12.03.23 @ 09:31) >  FINDINGS/  IMPRESSION: Heart size, mediastinal and hilar contours are unchanged and   within normal limits. There may be new mild pulmonary venous congestion.   No large effusions or pneumothorax. Degenerative changes involving both   glenohumeral joints.    ASSESSMENT/PLANS:  82 y/o male w/ HTN, HLD with progressive, sudden onset b/l proximal UE and LE weakness x 3 weeks without any systemic symptoms.  Pt was on Lipitor but it was stopped 1 week prior to current hospitalization.  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   - Continue treatment for rhabdomyolysis as per parimary team.  - Please contact us when biopsy results for further taper of prednisone and any next steps.   - I recommend patient to have MRI and biopsy completed inpatient prior to discharge.  - After discharge, patient should follow up with rheumatology, my information as below that patient should contact for expedited post-hospital follow up:  Иван Jefferson MD  [address] [place], [address]  Tel [phone]  Fax [phone]                                           [facility] Rheumatology [facility]                                                 Dr. Иван Jefferson MD                                   [address] [place] [address], [place], [place]  ---------------------------------------------------------------------------------------------------------    HISTORY OF PRESENT ILLNESS:  84 y/o male w/ HTN, HLD.   Patient presented to [facility] for progressive, sudden onset b/l proximal UE and LE weakness x 3 weeks.  Pt was on Lipitor but it was stopped 1 week prior to current hospitalization.    Denies joint pains, swelling, redness, dysphagia, Raynaud's, SOB, cough, rash, calcinosis.  ROS negative unless otherwise stated above.    Labwork in hospital with:   CPK 15.3k -> 10.9k   AST, ALT elevated (secondary to muscle damage, likely unrelated to liver function)   WBC 12s, ESR 30s-50s, CRP 7-8, LDH 1.3k   Normal/neg GGT, RF, Moses-1, TSH, PTH, Hep B/C   CXR normal     PAST MEDICAL & SURGICAL HISTORY:  HLD (hyperlipidemia)  Hypertension    REVIEW OF SYSTEMS:  As per HPI    MEDICATIONS  (STANDING):  cyanocobalamin 1000 MICROGram(s) Oral daily  enoxaparin Injectable 40 milliGRAM(s) SubCutaneous every 24 hours  folic acid 1 milliGRAM(s) Oral daily  pantoprazole    Tablet 40 milliGRAM(s) Oral before breakfast  predniSONE   Tablet 60 milliGRAM(s) Oral daily  sodium bicarbonate  Infusion 0.234 mEq/kG/Hr (120 mL/Hr) IV Continuous <Continuous>    MEDICATIONS  (PRN):    Allergies  No Known Allergies  Intolerances    FAMILY HISTORY:  No pertinent family history in first degree relatives    SOCIAL HISTORY:  Denies EtOH, smoking    Vital Signs Last 24 Hrs  T(C): 36.6 (04 Dec 2023 16:04), Max: 37.1 (03 Dec 2023 18:02)  T(F): 97.8 (04 Dec 2023 16:04), Max: 98.8 (03 Dec 2023 18:02)  HR: 66 (04 Dec 2023 16:04) (64 - 71)  BP: 135/79 (04 Dec 2023 16:04) (114/57 - 147/71)  BP(mean): 81 (03 Dec 2023 18:02) (81 - 81)  RR: 17 (04 Dec 2023 16:04) (16 - 17)  SpO2: 93% (04 Dec 2023 16:04) (93% - 96%)    Parameters below as of 04 Dec 2023 11:27  Patient On (Oxygen Delivery Method): room air    Daily     Daily     PHYSICAL EXAM:  Constitutional: Appears in no acute distress.  HEENT: EOMI, PERRL. No conjunctival erythema. Moist mucous membranes. No nasopharyngeal ulcers.  Neck: Supple, no cervical lymphadenopathy, no thyromegaly.  Cardiovascular: RRR. S1, S2 auscultated. No murmurs or rubs.  Respiratory: Clear to auscultation bilaterally. No wheezes, rales, or crackles.  Gastrointestinal: Soft, nontender, nondistended. Bowel sounds present. No organomegaly.  MSK: No active joint swelling, erythema, or warmth. No joint tenderness to palpation. No joint deformities.  Skin: No rashes or lesions.  Neuro: No focal deficits. Motor strength 3+/5 in b/l upper arms, 5-/5 in b/l thighs, otherwise normal.  Psych: AAOx3. Normal affect and thought process.    LABS:                        10.3   14.06 )-----------( 350      ( 04 Dec 2023 03:06 )             30.0     12-04    141  |  102  |  14.4  ----------------------------<  166<H>  4.6   |  28.0  |  0.53    Ca    8.2<L>      04 Dec 2023 10:35    TPro  5.0<L>  /  Alb  2.4<L>  /  TBili  0.3<L>  /  DBili  x   /  AST  506<H>  /  ALT  478<H>  /  AlkPhos  116  12-04      Urinalysis Basic - ( 04 Dec 2023 10:35 )    Color: x / Appearance: x / SG: x / pH: x  Gluc: 166 mg/dL / Ketone: x  / Bili: x / Urobili: x   Blood: x / Protein: x / Nitrite: x   Leuk Esterase: x / RBC: x / WBC x   Sq Epi: x / Non Sq Epi: x / Bacteria: x      Lactate Dehydrogenase, Serum: 912 U/L *H* [98 - 192] (12-04-23 @ 03:06)  Creatine Kinase, Serum: 77696 U/L *HH* [30 - 200] (12-03-23 @ 03:33)  Angiotensin Converting Enzyme, Serum: 16 U/L [14 - 82] (12-02-23 @ 20:38)  Hepatitis B Surface Antibody: Nonreact (12-02-23 @ 20:38)  Culture Results:   No growth at 24 hours (12-02-23 @ 16:51)  MOSES-1 Antibody: <0.2 AI (12-02-23 @ 16:38)  Rheumatoid Factor Quant, Serum or Plasma: <10 IU/mL [0 - 13] (12-02-23 @ 16:38)  Lactate Dehydrogenase, Serum: 1208 U/L *H* [98 - 192] (12-02-23 @ 16:38)  Hepatitis C Virus Interpretation: Nonreact (12-02-23 @ 16:38)  Hepatitis B Core IgM Antibody: Nonreact (12-02-23 @ 16:38)  Hepatitis B Surface Antigen: Nonreact (12-02-23 @ 16:38)  C-Reactive Protein, Serum: 8 mg/L *H* (12-02-23 @ 16:38)  Sedimentation Rate, Erythrocyte: 53 mm/hr *H* [0 - 15] (12-02-23 @ 16:38)  Culture Results:   No growth at 24 hours (12-02-23 @ 16:38)  Vitamin D, 25-Hydroxy: 32.2 ng/mL [30.0 - 80.0] (12-02-23 @ 16:38)  Protein, Urine: 100 mg/dL *!* (12-02-23 @ 15:00)  Culture Results:   <10,000 CFU/mL Normal Urogenital Heidi (12-02-23 @ 15:00)  Creatine Kinase, Serum: 35580 U/L *HH* [30 - 200] (12-02-23 @ 12:32)  C-Reactive Protein, Serum: 7 mg/L *H* (12-02-23 @ 12:32)  Sedimentation Rate, Erythrocyte: 34 mm/hr *H* [0 - 15] (12-02-23 @ 12:32)  Lactate Dehydrogenase, Serum: 1285 U/L *H* [98 - 192] (12-02-23 @ 12:32)    RADIOLOGY & ADDITIONAL STUDIES:  < from: Xray Chest 1 View-PORTABLE IMMEDIATE (Xray Chest 1 View-PORTABLE IMMEDIATE .) (12.03.23 @ 09:31) >  FINDINGS/  IMPRESSION: Heart size, mediastinal and hilar contours are unchanged and   within normal limits. There may be new mild pulmonary venous congestion.   No large effusions or pneumothorax. Degenerative changes involving both   glenohumeral joints.    ASSESSMENT/PLANS:  84 y/o male w/ HTN, HLD with progressive, sudden onset b/l proximal UE and LE weakness x 3 weeks without any systemic symptoms.  Pt was on Lipitor but it was stopped 1 week prior to current hospitalization.  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   - Continue treatment for rhabdomyolysis as per parimary team.  - Please contact us when biopsy results for further taper of prednisone and any next steps.   - I recommend patient to have MRI and biopsy completed inpatient prior to discharge.  - After discharge, patient should follow up with rheumatology, my information as below that patient should contact for expedited post-hospital follow up:  Иван Jefferson MD  [address] [place], [address]  Tel [phone]  Fax [phone]

## 2023-12-04 NOTE — CONSULT NOTE ADULT - SUBJECTIVE AND OBJECTIVE BOX
HPI: Pt is  83-year-old male with PMHx of HTN , hyperlipidemia presents with diffuse muscle weakness which began approx one month ago.  The weakness started in his shoulders and has steadily progressed to include both upper and lower body; he has difficulty arising to a standing position and lifting his arms.  Pt found to have significant elevation of CPKs which we are called to evaluate.  He was seen in [facility] last week for same bur signed out AMA.  Pt was taking STATIN drug but has discontinued for > 1 week.  Pt has noted dark colored urine as well.      PAST MEDICAL & SURGICAL HISTORY:  HLD (hyperlipidemia)      Hypertension          FAMILY HISTORY:  No pertinent family history in first degree relatives        Social History:  No tobacco; no etoh nor drug abuse      MEDICATIONS  (STANDING):  cyanocobalamin 1000 MICROGram(s) Oral daily  enoxaparin Injectable 40 milliGRAM(s) SubCutaneous every 24 hours  folic acid 1 milliGRAM(s) Oral daily  pantoprazole    Tablet 40 milliGRAM(s) Oral before breakfast  potassium chloride    Tablet ER 40 milliEquivalent(s) Oral every 4 hours  predniSONE   Tablet 60 milliGRAM(s) Oral daily  sodium bicarbonate  Infusion 0.234 mEq/kG/Hr (120 mL/Hr) IV Continuous <Continuous>    MEDICATIONS  (PRN):      Allergies    No Known Allergies    Intolerances        REVIEW OF SYSTEMS:  CONSTITUTIONAL: No fever, weight loss, + fatigue + weakness  EYES: No eye pain, visual disturbances, or discharge  ENMT:  No difficulty hearing, tinnitus, vertigo; No sinus or throat pain  NECK: No pain or stiffness  BREASTS: No pain, masses, or nipple discharge  RESPIRATORY: No cough, wheezing, chills or hemoptysis; No shortness of breath  CARDIOVASCULAR: No chest pain, palpitations, dizziness, or leg swelling  GASTROINTESTINAL: No abdominal or epigastric pain. No nausea, vomiting, or hematemesis; No diarrhea or constipation. No melena or hematochezia.  GENITOURINARY: No dysuria, frequency, hematuria, or incontinence  NEUROLOGICAL: No headaches, memory loss, loss of strength, numbness, or tremors  SKIN: No itching, burning, rashes, or lesions   ENDOCRINE: No heat or cold intolerance; No hair loss  MUSCULOSKELETAL: No joint pain or swelling; + muscle weakness  ALLERY AND IMMUNOLOGIC: No hives or eczema      Vital Signs Last 24 Hrs  T(C): 36.7 (04 Dec 2023 05:05), Max: 37.1 (03 Dec 2023 18:02)  T(F): 98 (04 Dec 2023 05:05), Max: 98.8 (03 Dec 2023 18:02)  HR: 64 (04 Dec 2023 05:05) (64 - 86)  BP: 114/57 (04 Dec 2023 05:05) (114/57 - 147/71)  BP(mean): 81 (03 Dec 2023 18:02) (81 - 81)  RR: 16 (04 Dec 2023 05:05) (16 - 18)  SpO2: 93% (04 Dec 2023 05:05) (93% - 96%)    Parameters below as of 04 Dec 2023 05:05  Patient On (Oxygen Delivery Method): room air        PHYSICAL EXAM:  GENERAL: NAD, well-groomed, well-developed  HEAD:  Atraumatic, Normocephalic  EYES: EOMI, PERRLA, conjunctiva and sclera clear  ENMT: No tonsillar erythema, exudates, or enlargement; Moist mucous membranes, Good dentition, No lesions  NECK: Supple, No JVD, Normal thyroid  NERVOUS SYSTEM:  Alert & Oriented X3, Good concentration; Motor Strength 5/5 B/L upper and lower extremities; DTRs 2+ intact and symmetric  CHEST/LUNG: Clear to percussion bilaterally; No rales, rhonchi, wheezing, or rubs  HEART: Regular rate and rhythm; No murmurs, rubs, or gallops  ABDOMEN: Soft, Nontender, Nondistended; Bowel sounds present  EXTREMITIES:  2+ Peripheral Pulses, No clubbing, cyanosis, or edema  LYMPH: No lymphadenopathy noted  SKIN: No rashes or lesions      LABS:                        10.3   14.06 )-----------( 350      ( 04 Dec 2023 03:06 )             30.0     12-04    140  |  104  |  16.6  ----------------------------<  115<H>  3.1<L>   |  24.0  |  0.47<L>    Ca    7.9<L>      04 Dec 2023 03:06    TPro  5.0<L>  /  Alb  2.4<L>  /  TBili  0.3<L>  /  DBili  x   /  AST  506<H>  /  ALT  478<H>  /  AlkPhos  116  12-04      Urinalysis Basic - ( 04 Dec 2023 03:06 )    Color: x / Appearance: x / SG: x / pH: x  Gluc: 115 mg/dL / Ketone: x  / Bili: x / Urobili: x   Blood: x / Protein: x / Nitrite: x   Leuk Esterase: x / RBC: x / WBC x   Sq Epi: x / Non Sq Epi: x / Bacteria: x      Creatine Kinase, Serum: 48947: TYPE:(C=Critical, N=Notification, A=Abnormal) C   TESTS: CPK   DATE/TIME CALLED: 11/28/2023      Creatine Kinase, Serum: 47877: Critical value:   TYPE:(C=Critical, N=Notification, A=Abnormal) C   TESTS: _CPK   DATE/TIME CALLED: 12/03/2023      RADIOLOGY & ADDITIONAL TESTS:  < from: Xray Chest 1 View-PORTABLE IMMEDIATE (Xray Chest 1 View-PORTABLE IMMEDIATE .) (12.03.23 @ 09:31) >  ACC: [id number] EXAM:  XR CHEST PORTABLE IMMED 1V   ORDERED BY: CONTRERAS CARMONA     PROCEDURE DATE:  12/03/2023          INTERPRETATION:  XR CHEST IMMEDIATE dated 12/3/2023 9:31 AM    CLINICAL INFORMATION: Male, 83 years old.  SOB.    PRIOR STUDIES: 12/2/2023    FINDINGS/  IMPRESSION: Heart size, mediastinal and hilar contours are unchanged and   within normal limits. There may be new mild pulmonary venous congestion.   No large effusions or pneumothorax. Degenerative changes involving both   glenohumeral joints.    < end of copied text >     HPI: Pt is  83-year-old male with PMHx of HTN , hyperlipidemia presents with diffuse muscle weakness which began approx one month ago.  The weakness started in his shoulders and has steadily progressed to include both upper and lower body; he has difficulty arising to a standing position and lifting his arms.  Pt found to have significant elevation of CPKs which we are called to evaluate.  He was seen in [facility] last week for same bur signed out AMA.  Pt was taking STATIN drug but has discontinued for > 1 week.  Pt has noted dark colored urine as well.      PAST MEDICAL & SURGICAL HISTORY:  HLD (hyperlipidemia)      Hypertension          FAMILY HISTORY:  No pertinent family history in first degree relatives        Social History:  No tobacco; no etoh nor drug abuse      MEDICATIONS  (STANDING):  cyanocobalamin 1000 MICROGram(s) Oral daily  enoxaparin Injectable 40 milliGRAM(s) SubCutaneous every 24 hours  folic acid 1 milliGRAM(s) Oral daily  pantoprazole    Tablet 40 milliGRAM(s) Oral before breakfast  potassium chloride    Tablet ER 40 milliEquivalent(s) Oral every 4 hours  predniSONE   Tablet 60 milliGRAM(s) Oral daily  sodium bicarbonate  Infusion 0.234 mEq/kG/Hr (120 mL/Hr) IV Continuous <Continuous>    MEDICATIONS  (PRN):      Allergies    No Known Allergies    Intolerances        REVIEW OF SYSTEMS:  CONSTITUTIONAL: No fever, weight loss, + fatigue + weakness  EYES: No eye pain, visual disturbances, or discharge  ENMT:  No difficulty hearing, tinnitus, vertigo; No sinus or throat pain  NECK: No pain or stiffness  BREASTS: No pain, masses, or nipple discharge  RESPIRATORY: No cough, wheezing, chills or hemoptysis; No shortness of breath  CARDIOVASCULAR: No chest pain, palpitations, dizziness, or leg swelling  GASTROINTESTINAL: No abdominal or epigastric pain. No nausea, vomiting, or hematemesis; No diarrhea or constipation. No melena or hematochezia.  GENITOURINARY: No dysuria, frequency, hematuria, or incontinence  NEUROLOGICAL: No headaches, memory loss, loss of strength, numbness, or tremors  SKIN: No itching, burning, rashes, or lesions   ENDOCRINE: No heat or cold intolerance; No hair loss  MUSCULOSKELETAL: No joint pain or swelling; + muscle weakness  ALLERY AND IMMUNOLOGIC: No hives or eczema      Vital Signs Last 24 Hrs  T(C): 36.7 (04 Dec 2023 05:05), Max: 37.1 (03 Dec 2023 18:02)  T(F): 98 (04 Dec 2023 05:05), Max: 98.8 (03 Dec 2023 18:02)  HR: 64 (04 Dec 2023 05:05) (64 - 86)  BP: 114/57 (04 Dec 2023 05:05) (114/57 - 147/71)  BP(mean): 81 (03 Dec 2023 18:02) (81 - 81)  RR: 16 (04 Dec 2023 05:05) (16 - 18)  SpO2: 93% (04 Dec 2023 05:05) (93% - 96%)    Parameters below as of 04 Dec 2023 05:05  Patient On (Oxygen Delivery Method): room air        PHYSICAL EXAM:  GENERAL: NAD, well-groomed, well-developed  HEAD:  Atraumatic, Normocephalic  EYES: EOMI, PERRLA, conjunctiva and sclera clear  ENMT: No tonsillar erythema, exudates, or enlargement; Moist mucous membranes, Good dentition, No lesions  NECK: Supple, No JVD, Normal thyroid  NERVOUS SYSTEM:  Alert & Oriented X3, Good concentration; Motor Strength 5/5 B/L upper and lower extremities; DTRs 2+ intact and symmetric  CHEST/LUNG: Clear to percussion bilaterally; No rales, rhonchi, wheezing, or rubs  HEART: Regular rate and rhythm; No murmurs, rubs, or gallops  ABDOMEN: Soft, Nontender, Nondistended; Bowel sounds present  EXTREMITIES:  2+ Peripheral Pulses, No clubbing, cyanosis, or edema  LYMPH: No lymphadenopathy noted  SKIN: No rashes or lesions      LABS:                        10.3   14.06 )-----------( 350      ( 04 Dec 2023 03:06 )             30.0     12-04    140  |  104  |  16.6  ----------------------------<  115<H>  3.1<L>   |  24.0  |  0.47<L>    Ca    7.9<L>      04 Dec 2023 03:06    TPro  5.0<L>  /  Alb  2.4<L>  /  TBili  0.3<L>  /  DBili  x   /  AST  506<H>  /  ALT  478<H>  /  AlkPhos  116  12-04      Urinalysis Basic - ( 04 Dec 2023 03:06 )    Color: x / Appearance: x / SG: x / pH: x  Gluc: 115 mg/dL / Ketone: x  / Bili: x / Urobili: x   Blood: x / Protein: x / Nitrite: x   Leuk Esterase: x / RBC: x / WBC x   Sq Epi: x / Non Sq Epi: x / Bacteria: x      Creatine Kinase, Serum: 91249: TYPE:(C=Critical, N=Notification, A=Abnormal) C   TESTS: CPK   DATE/TIME CALLED: 11/28/2023      Creatine Kinase, Serum: 07931: Critical value:   TYPE:(C=Critical, N=Notification, A=Abnormal) C   TESTS: _CPK   DATE/TIME CALLED: 12/03/2023      RADIOLOGY & ADDITIONAL TESTS:  < from: Xray Chest 1 View-PORTABLE IMMEDIATE (Xray Chest 1 View-PORTABLE IMMEDIATE .) (12.03.23 @ 09:31) >  ACC: [id number] EXAM:  XR CHEST PORTABLE IMMED 1V   ORDERED BY: CONTRERAS CARMONA     PROCEDURE DATE:  12/03/2023          INTERPRETATION:  XR CHEST IMMEDIATE dated 12/3/2023 9:31 AM    CLINICAL INFORMATION: Male, 83 years old.  SOB.    PRIOR STUDIES: 12/2/2023    FINDINGS/  IMPRESSION: Heart size, mediastinal and hilar contours are unchanged and   within normal limits. There may be new mild pulmonary venous congestion.   No large effusions or pneumothorax. Degenerative changes involving both   glenohumeral joints.    < end of copied text >     HPI: Pt is  83-year-old male with PMHx of HTN , hyperlipidemia presents with diffuse muscle weakness which began approx one month ago.  The weakness started in his shoulders and has steadily progressed to include both upper and lower body; he has difficulty arising to a standing position and lifting his arms.  Pt found to have significant elevation of CPKs which we are called to evaluate.  He was seen in [facility] last week for same bur signed out AMA.  Pt was taking STATIN drug but has discontinued for > 1 week.  Pt has noted dark colored urine as well.      PAST MEDICAL & SURGICAL HISTORY:  HLD (hyperlipidemia)      Hypertension          FAMILY HISTORY:  No pertinent family history in first degree relatives        Social History:  No tobacco; no etoh nor drug abuse      MEDICATIONS  (STANDING):  cyanocobalamin 1000 MICROGram(s) Oral daily  enoxaparin Injectable 40 milliGRAM(s) SubCutaneous every 24 hours  folic acid 1 milliGRAM(s) Oral daily  pantoprazole    Tablet 40 milliGRAM(s) Oral before breakfast  potassium chloride    Tablet ER 40 milliEquivalent(s) Oral every 4 hours  predniSONE   Tablet 60 milliGRAM(s) Oral daily  sodium bicarbonate  Infusion 0.234 mEq/kG/Hr (120 mL/Hr) IV Continuous <Continuous>    MEDICATIONS  (PRN):      Allergies    No Known Allergies    Intolerances        REVIEW OF SYSTEMS:  CONSTITUTIONAL: No fever, weight loss, + fatigue + weakness  EYES: No eye pain, visual disturbances, or discharge  ENMT:  No difficulty hearing, tinnitus, vertigo; No sinus or throat pain  NECK: No pain or stiffness  BREASTS: No pain, masses, or nipple discharge  RESPIRATORY: No cough, wheezing, chills or hemoptysis; No shortness of breath  CARDIOVASCULAR: No chest pain, palpitations, dizziness, or leg swelling  GASTROINTESTINAL: No abdominal or epigastric pain. No nausea, vomiting, or hematemesis; No diarrhea or constipation. No melena or hematochezia.  GENITOURINARY: No dysuria, frequency, hematuria, or incontinence  NEUROLOGICAL: No headaches, memory loss, loss of strength, numbness, or tremors  SKIN: No itching, burning, rashes, or lesions   ENDOCRINE: No heat or cold intolerance; No hair loss  MUSCULOSKELETAL: No joint pain or swelling; + muscle weakness  ALLERY AND IMMUNOLOGIC: No hives or eczema      Vital Signs Last 24 Hrs  T(C): 36.7 (04 Dec 2023 05:05), Max: 37.1 (03 Dec 2023 18:02)  T(F): 98 (04 Dec 2023 05:05), Max: 98.8 (03 Dec 2023 18:02)  HR: 64 (04 Dec 2023 05:05) (64 - 86)  BP: 114/57 (04 Dec 2023 05:05) (114/57 - 147/71)  BP(mean): 81 (03 Dec 2023 18:02) (81 - 81)  RR: 16 (04 Dec 2023 05:05) (16 - 18)  SpO2: 93% (04 Dec 2023 05:05) (93% - 96%)    Parameters below as of 04 Dec 2023 05:05  Patient On (Oxygen Delivery Method): room air        PHYSICAL EXAM:  GENERAL: Comfortable  HEAD: No periorbital edema  EYES: Conjunctiva and sclera clear  ENMT: Dry mucous membranes, Good dentition, No lesions  NECK: Supple, No JVD  NERVOUS SYSTEM:  Alert & Oriented X3, intact and symmetric  CHEST/LUNG: Clear bilaterally  HEART: Regular rate and rhythm; No rub  ABDOMEN: Soft, Nontender, +BS  EXTREMITIES:  2+ Peripheral Pulses, + LE edema; no specific muscle tenderness  SKIN: No rashes or lesions      LABS:                        10.3   14.06 )-----------( 350      ( 04 Dec 2023 03:06 )             30.0     12-04    140  |  104  |  16.6  ----------------------------<  115<H>  3.1<L>   |  24.0  |  0.47<L>    Ca    7.9<L>      04 Dec 2023 03:06    TPro  5.0<L>  /  Alb  2.4<L>  /  TBili  0.3<L>  /  DBili  x   /  AST  506<H>  /  ALT  478<H>  /  AlkPhos  116  12-04      Urinalysis Basic - ( 04 Dec 2023 03:06 )    Color: x / Appearance: x / SG: x / pH: x  Gluc: 115 mg/dL / Ketone: x  / Bili: x / Urobili: x   Blood: x / Protein: x / Nitrite: x   Leuk Esterase: x / RBC: x / WBC x   Sq Epi: x / Non Sq Epi: x / Bacteria: x      Creatine Kinase, Serum: 31862: TYPE:(C=Critical, N=Notification, A=Abnormal) C   TESTS: CPK   DATE/TIME CALLED: 11/28/2023      Creatine Kinase, Serum: 77794: Critical value:   TYPE:(C=Critical, N=Notification, A=Abnormal) C   TESTS: _CPK   DATE/TIME CALLED: 12/03/2023      RADIOLOGY & ADDITIONAL TESTS:  < from: Xray Chest 1 View-PORTABLE IMMEDIATE (Xray Chest 1 View-PORTABLE IMMEDIATE .) (12.03.23 @ 09:31) >  ACC: [id number] EXAM:  XR CHEST PORTABLE IMMED 1V   ORDERED BY: CONTRERAS CARMONA     PROCEDURE DATE:  12/03/2023          INTERPRETATION:  XR CHEST IMMEDIATE dated 12/3/2023 9:31 AM    CLINICAL INFORMATION: Male, 83 years old.  SOB.    PRIOR STUDIES: 12/2/2023    FINDINGS/  IMPRESSION: Heart size, mediastinal and hilar contours are unchanged and   within normal limits. There may be new mild pulmonary venous congestion.   No large effusions or pneumothorax. Degenerative changes involving both   glenohumeral joints.    < end of copied text >     HPI: Pt is  83-year-old male with PMHx of HTN , hyperlipidemia presents with diffuse muscle weakness which began approx one month ago.  The weakness started in his shoulders and has steadily progressed to include both upper and lower body; he has difficulty arising to a standing position and lifting his arms.  Pt found to have significant elevation of CPKs which we are called to evaluate.  He was seen in [facility] last week for same bur signed out AMA.  Pt was taking STATIN drug but has discontinued for > 1 week.  Pt has noted dark colored urine as well.      PAST MEDICAL & SURGICAL HISTORY:  HLD (hyperlipidemia)      Hypertension          FAMILY HISTORY:  No pertinent family history in first degree relatives        Social History:  No tobacco; no etoh nor drug abuse      MEDICATIONS  (STANDING):  cyanocobalamin 1000 MICROGram(s) Oral daily  enoxaparin Injectable 40 milliGRAM(s) SubCutaneous every 24 hours  folic acid 1 milliGRAM(s) Oral daily  pantoprazole    Tablet 40 milliGRAM(s) Oral before breakfast  potassium chloride    Tablet ER 40 milliEquivalent(s) Oral every 4 hours  predniSONE   Tablet 60 milliGRAM(s) Oral daily  sodium bicarbonate  Infusion 0.234 mEq/kG/Hr (120 mL/Hr) IV Continuous <Continuous>    MEDICATIONS  (PRN):      Allergies    No Known Allergies    Intolerances        REVIEW OF SYSTEMS:  CONSTITUTIONAL: No fever, weight loss, + fatigue + weakness  EYES: No eye pain, visual disturbances, or discharge  ENMT:  No difficulty hearing, tinnitus, vertigo; No sinus or throat pain  NECK: No pain or stiffness  BREASTS: No pain, masses, or nipple discharge  RESPIRATORY: No cough, wheezing, chills or hemoptysis; No shortness of breath  CARDIOVASCULAR: No chest pain, palpitations, dizziness, or leg swelling  GASTROINTESTINAL: No abdominal or epigastric pain. No nausea, vomiting, or hematemesis; No diarrhea or constipation. No melena or hematochezia.  GENITOURINARY: No dysuria, frequency, hematuria, or incontinence  NEUROLOGICAL: No headaches, memory loss, loss of strength, numbness, or tremors  SKIN: No itching, burning, rashes, or lesions   ENDOCRINE: No heat or cold intolerance; No hair loss  MUSCULOSKELETAL: No joint pain or swelling; + muscle weakness  ALLERY AND IMMUNOLOGIC: No hives or eczema      Vital Signs Last 24 Hrs  T(C): 36.7 (04 Dec 2023 05:05), Max: 37.1 (03 Dec 2023 18:02)  T(F): 98 (04 Dec 2023 05:05), Max: 98.8 (03 Dec 2023 18:02)  HR: 64 (04 Dec 2023 05:05) (64 - 86)  BP: 114/57 (04 Dec 2023 05:05) (114/57 - 147/71)  BP(mean): 81 (03 Dec 2023 18:02) (81 - 81)  RR: 16 (04 Dec 2023 05:05) (16 - 18)  SpO2: 93% (04 Dec 2023 05:05) (93% - 96%)    Parameters below as of 04 Dec 2023 05:05  Patient On (Oxygen Delivery Method): room air        PHYSICAL EXAM:  GENERAL: Comfortable  HEAD: No periorbital edema  EYES: Conjunctiva and sclera clear  ENMT: Dry mucous membranes, Good dentition, No lesions  NECK: Supple, No JVD  NERVOUS SYSTEM:  Alert & Oriented X3, intact and symmetric  CHEST/LUNG: Clear bilaterally  HEART: Regular rate and rhythm; No rub  ABDOMEN: Soft, Nontender, +BS  EXTREMITIES:  2+ Peripheral Pulses, + LE edema; no specific muscle tenderness  SKIN: No rashes or lesions      LABS:                        10.3   14.06 )-----------( 350      ( 04 Dec 2023 03:06 )             30.0     12-04    140  |  104  |  16.6  ----------------------------<  115<H>  3.1<L>   |  24.0  |  0.47<L>    Ca    7.9<L>      04 Dec 2023 03:06    TPro  5.0<L>  /  Alb  2.4<L>  /  TBili  0.3<L>  /  DBili  x   /  AST  506<H>  /  ALT  478<H>  /  AlkPhos  116  12-04      Urinalysis Basic - ( 04 Dec 2023 03:06 )    Color: x / Appearance: x / SG: x / pH: x  Gluc: 115 mg/dL / Ketone: x  / Bili: x / Urobili: x   Blood: x / Protein: x / Nitrite: x   Leuk Esterase: x / RBC: x / WBC x   Sq Epi: x / Non Sq Epi: x / Bacteria: x      Creatine Kinase, Serum: 76451: TYPE:(C=Critical, N=Notification, A=Abnormal) C   TESTS: CPK   DATE/TIME CALLED: 11/28/2023      Creatine Kinase, Serum: 28712: Critical value:   TYPE:(C=Critical, N=Notification, A=Abnormal) C   TESTS: _CPK   DATE/TIME CALLED: 12/03/2023      RADIOLOGY & ADDITIONAL TESTS:  < from: Xray Chest 1 View-PORTABLE IMMEDIATE (Xray Chest 1 View-PORTABLE IMMEDIATE .) (12.03.23 @ 09:31) >  ACC: [id number] EXAM:  XR CHEST PORTABLE IMMED 1V   ORDERED BY: CONTRERAS CARMONA     PROCEDURE DATE:  12/03/2023          INTERPRETATION:  XR CHEST IMMEDIATE dated 12/3/2023 9:31 AM    CLINICAL INFORMATION: Male, 83 years old.  SOB.    PRIOR STUDIES: 12/2/2023    FINDINGS/  IMPRESSION: Heart size, mediastinal and hilar contours are unchanged and   within normal limits. There may be new mild pulmonary venous congestion.   No large effusions or pneumothorax. Degenerative changes involving both   glenohumeral joints.    < end of copied text >

## 2023-12-04 NOTE — CHART NOTE - NSCHARTNOTEFT_GEN_A_CORE
PA NOTE-MEDICINE    Called by RN with New Lab result of Potassium 3.1  Ordered Potassium Chloride ER 40 Meq po x 2 doses 4 hrs apart  Repeat BMP ordered for 11 AM   Continue to Monitor Pt  Recall PA for any changes in Pt Status   Will sign out to AM Team to Follow

## 2023-12-04 NOTE — PROGRESS NOTE ADULT - SUBJECTIVE AND OBJECTIVE BOX
Patient is a 83y old  Male who presents with a chief complaint of muscle weakness (04 Dec 2023 13:42)      Patient seen and examined at bedside. No overnight events reported.     ALLERGIES:  No Known Allergies    MEDICATIONS  (STANDING):  cyanocobalamin 1000 MICROGram(s) Oral daily  enoxaparin Injectable 40 milliGRAM(s) SubCutaneous every 24 hours  folic acid 1 milliGRAM(s) Oral daily  pantoprazole    Tablet 40 milliGRAM(s) Oral before breakfast  predniSONE   Tablet 60 milliGRAM(s) Oral daily  sodium bicarbonate  Infusion 0.234 mEq/kG/Hr (120 mL/Hr) IV Continuous <Continuous>    MEDICATIONS  (PRN):    Vital Signs Last 24 Hrs  T(F): 98 (04 Dec 2023 11:27), Max: 98.8 (03 Dec 2023 18:02)  HR: 70 (04 Dec 2023 11:27) (64 - 71)  BP: 147/71 (04 Dec 2023 11:27) (114/57 - 147/71)  RR: 17 (04 Dec 2023 11:27) (16 - 17)  SpO2: 96% (04 Dec 2023 11:27) (93% - 96%)  I&O's Summary    03 Dec 2023 07:01  -  04 Dec 2023 07:00  --------------------------------------------------------  IN: 480 mL / OUT: 0 mL / NET: 480 mL        PHYSICAL EXAM:  General: awake   Lungs: CTA   Cardio: RRR, S1/S2, No murmur  Abdomen: Soft, Nontender, Nondistended; Bowel sounds present  Extremities: No calf tenderness, No pitting edema    LABS:                        10.3   14.06 )-----------( 350      ( 04 Dec 2023 03:06 )             30.0     12-04    141  |  102  |  14.4  ----------------------------<  166  4.6   |  28.0  |  0.53    Ca    8.2      04 Dec 2023 10:35    TPro  5.0  /  Alb  2.4  /  TBili  0.3  /  DBili  x   /  AST  506  /  ALT  478  /  AlkPhos  116  12-04                CARDIAC MARKERS ( 03 Dec 2023 03:33 )  x     / x     / 66438 U/L / x     / 169.6 ng/mL  CARDIAC MARKERS ( 02 Dec 2023 12:32 )  x     / x     / 71448 U/L / x     / 244.8 ng/mL        TSH 1.31   TSH with FT4 reflex --  Total T3 95                  Urinalysis Basic - ( 04 Dec 2023 10:35 )    Color: x / Appearance: x / SG: x / pH: x  Gluc: 166 mg/dL / Ketone: x  / Bili: x / Urobili: x   Blood: x / Protein: x / Nitrite: x   Leuk Esterase: x / RBC: x / WBC x   Sq Epi: x / Non Sq Epi: x / Bacteria: x        Culture - Blood (collected 02 Dec 2023 16:51)  Source: .Blood Blood-Peripheral  Preliminary Report (03 Dec 2023 23:02):    No growth at 24 hours    Culture - Blood (collected 02 Dec 2023 16:38)  Source: .Blood Blood-Peripheral  Preliminary Report (03 Dec 2023 23:02):    No growth at 24 hours    Culture - Urine (collected 02 Dec 2023 15:00)  Source: Clean Catch Clean Catch (Midstream)  Final Report (03 Dec 2023 17:45):    <10,000 CFU/mL Normal Urogenital Heidi        RADIOLOGY & ADDITIONAL TESTS:       Patient is a 83y old  Male who presents with a chief complaint of muscle weakness (04 Dec 2023 13:42)      Patient seen and examined at bedside. No overnight events reported.     ALLERGIES:  No Known Allergies    MEDICATIONS  (STANDING):  cyanocobalamin 1000 MICROGram(s) Oral daily  enoxaparin Injectable 40 milliGRAM(s) SubCutaneous every 24 hours  folic acid 1 milliGRAM(s) Oral daily  pantoprazole    Tablet 40 milliGRAM(s) Oral before breakfast  predniSONE   Tablet 60 milliGRAM(s) Oral daily  sodium bicarbonate  Infusion 0.234 mEq/kG/Hr (120 mL/Hr) IV Continuous <Continuous>    MEDICATIONS  (PRN):    Vital Signs Last 24 Hrs  T(F): 98 (04 Dec 2023 11:27), Max: 98.8 (03 Dec 2023 18:02)  HR: 70 (04 Dec 2023 11:27) (64 - 71)  BP: 147/71 (04 Dec 2023 11:27) (114/57 - 147/71)  RR: 17 (04 Dec 2023 11:27) (16 - 17)  SpO2: 96% (04 Dec 2023 11:27) (93% - 96%)  I&O's Summary    03 Dec 2023 07:01  -  04 Dec 2023 07:00  --------------------------------------------------------  IN: 480 mL / OUT: 0 mL / NET: 480 mL        PHYSICAL EXAM:  General: awake   Lungs: CTA   Cardio: RRR, S1/S2, No murmur  Abdomen: Soft, Nontender, Nondistended; Bowel sounds present  Extremities: No calf tenderness, No pitting edema    LABS:                        10.3   14.06 )-----------( 350      ( 04 Dec 2023 03:06 )             30.0     12-04    141  |  102  |  14.4  ----------------------------<  166  4.6   |  28.0  |  0.53    Ca    8.2      04 Dec 2023 10:35    TPro  5.0  /  Alb  2.4  /  TBili  0.3  /  DBili  x   /  AST  506  /  ALT  478  /  AlkPhos  116  12-04                CARDIAC MARKERS ( 03 Dec 2023 03:33 )  x     / x     / 44132 U/L / x     / 169.6 ng/mL  CARDIAC MARKERS ( 02 Dec 2023 12:32 )  x     / x     / 81287 U/L / x     / 244.8 ng/mL        TSH 1.31   TSH with FT4 reflex --  Total T3 95                  Urinalysis Basic - ( 04 Dec 2023 10:35 )    Color: x / Appearance: x / SG: x / pH: x  Gluc: 166 mg/dL / Ketone: x  / Bili: x / Urobili: x   Blood: x / Protein: x / Nitrite: x   Leuk Esterase: x / RBC: x / WBC x   Sq Epi: x / Non Sq Epi: x / Bacteria: x        Culture - Blood (collected 02 Dec 2023 16:51)  Source: .Blood Blood-Peripheral  Preliminary Report (03 Dec 2023 23:02):    No growth at 24 hours    Culture - Blood (collected 02 Dec 2023 16:38)  Source: .Blood Blood-Peripheral  Preliminary Report (03 Dec 2023 23:02):    No growth at 24 hours    Culture - Urine (collected 02 Dec 2023 15:00)  Source: Clean Catch Clean Catch (Midstream)  Final Report (03 Dec 2023 17:45):    <10,000 CFU/mL Normal Urogenital Heidi        RADIOLOGY & ADDITIONAL TESTS:

## 2023-12-04 NOTE — PROGRESS NOTE ADULT - ASSESSMENT
84 yo M with HTN , hyperlipidemia presented with shoulder arm pain , weakness of arms and lower leg high CPK       1-  muscle weakness, myopathy suspected   inflamation vs infection   rheumotogy consulted d/w Dr Jefferson 12/2   work up in progress   cont  na bicarbonate iv infusion   cont empirical po prednisone   Mr of shoulder musckle P   may need muscle biopsy   aldolase P     2- rhabdomyolysis   cont IVF   nephrology consulted   cr is normal   monitor CPK   hold lipitor   trend  CPK , LFTS     3- Hypokalemia   replaced Po k given   repleted  BMP at noon   normal K     4-HTN   will hold lisinoipril givn risk of renal issues in setting of rhabdo high CPK   BP is controlled     DVT prophylaxis

## 2023-12-04 NOTE — CONSULT NOTE ADULT - ASSESSMENT
Acute(?) rhabdo w/o renal failure  UA: + blood + protein  Pt off STATIN approx 1 week  Must r/o underlying Rheumatologic or Endocrinologic etiology  TFTs, cortisol normal  - avoid potential nephrotoxins  - cont IVF/bicab infusion and monitor labs  - follow labs

## 2023-12-05 LAB
ANA TITR SER: NEGATIVE — SIGNIFICANT CHANGE UP
ANION GAP SERPL CALC-SCNC: 12 MMOL/L — SIGNIFICANT CHANGE UP (ref 5–17)
ANION GAP SERPL CALC-SCNC: 12 MMOL/L — SIGNIFICANT CHANGE UP (ref 5–17)
BUN SERPL-MCNC: 13 MG/DL — SIGNIFICANT CHANGE UP (ref 8–20)
BUN SERPL-MCNC: 13 MG/DL — SIGNIFICANT CHANGE UP (ref 8–20)
CALCIUM SERPL-MCNC: 8.6 MG/DL — SIGNIFICANT CHANGE UP (ref 8.4–10.5)
CALCIUM SERPL-MCNC: 8.6 MG/DL — SIGNIFICANT CHANGE UP (ref 8.4–10.5)
CCP IGG SERPL-ACNC: 17 UNITS — SIGNIFICANT CHANGE UP
CCP IGG SERPL-ACNC: 17 UNITS — SIGNIFICANT CHANGE UP
CHLORIDE SERPL-SCNC: 100 MMOL/L — SIGNIFICANT CHANGE UP (ref 96–108)
CHLORIDE SERPL-SCNC: 100 MMOL/L — SIGNIFICANT CHANGE UP (ref 96–108)
CK MB CFR SERPL CALC: 123.1 NG/ML — HIGH (ref 0–6.7)
CK MB CFR SERPL CALC: 123.1 NG/ML — HIGH (ref 0–6.7)
CK SERPL-CCNC: 8991 U/L — HIGH (ref 30–200)
CK SERPL-CCNC: 8991 U/L — HIGH (ref 30–200)
CO2 SERPL-SCNC: 30 MMOL/L — HIGH (ref 22–29)
CO2 SERPL-SCNC: 30 MMOL/L — HIGH (ref 22–29)
CREAT SERPL-MCNC: 0.48 MG/DL — LOW (ref 0.5–1.3)
CREAT SERPL-MCNC: 0.48 MG/DL — LOW (ref 0.5–1.3)
EGFR: 103 ML/MIN/1.73M2 — SIGNIFICANT CHANGE UP
EGFR: 103 ML/MIN/1.73M2 — SIGNIFICANT CHANGE UP
GAMMA INTERFERON BACKGROUND BLD IA-ACNC: 0.03 IU/ML — SIGNIFICANT CHANGE UP
GAMMA INTERFERON BACKGROUND BLD IA-ACNC: 0.03 IU/ML — SIGNIFICANT CHANGE UP
GLUCOSE SERPL-MCNC: 84 MG/DL — SIGNIFICANT CHANGE UP (ref 70–99)
GLUCOSE SERPL-MCNC: 84 MG/DL — SIGNIFICANT CHANGE UP (ref 70–99)
M TB IFN-G BLD-IMP: NEGATIVE — SIGNIFICANT CHANGE UP
M TB IFN-G BLD-IMP: NEGATIVE — SIGNIFICANT CHANGE UP
M TB IFN-G CD4+ BCKGRND COR BLD-ACNC: -0.01 IU/ML — SIGNIFICANT CHANGE UP
M TB IFN-G CD4+ BCKGRND COR BLD-ACNC: -0.01 IU/ML — SIGNIFICANT CHANGE UP
M TB IFN-G CD4+CD8+ BCKGRND COR BLD-ACNC: -0.01 IU/ML — SIGNIFICANT CHANGE UP
M TB IFN-G CD4+CD8+ BCKGRND COR BLD-ACNC: -0.01 IU/ML — SIGNIFICANT CHANGE UP
MAGNESIUM SERPL-MCNC: 2 MG/DL — SIGNIFICANT CHANGE UP (ref 1.6–2.6)
MAGNESIUM SERPL-MCNC: 2 MG/DL — SIGNIFICANT CHANGE UP (ref 1.6–2.6)
PHOSPHATE SERPL-MCNC: 2.8 MG/DL — SIGNIFICANT CHANGE UP (ref 2.4–4.7)
PHOSPHATE SERPL-MCNC: 2.8 MG/DL — SIGNIFICANT CHANGE UP (ref 2.4–4.7)
POTASSIUM SERPL-MCNC: 3.3 MMOL/L — LOW (ref 3.5–5.3)
POTASSIUM SERPL-MCNC: 3.3 MMOL/L — LOW (ref 3.5–5.3)
POTASSIUM SERPL-SCNC: 3.3 MMOL/L — LOW (ref 3.5–5.3)
POTASSIUM SERPL-SCNC: 3.3 MMOL/L — LOW (ref 3.5–5.3)
QUANT TB PLUS MITOGEN MINUS NIL: 4.73 IU/ML — SIGNIFICANT CHANGE UP
QUANT TB PLUS MITOGEN MINUS NIL: 4.73 IU/ML — SIGNIFICANT CHANGE UP
RF+CCP IGG SER-IMP: NEGATIVE — SIGNIFICANT CHANGE UP
RF+CCP IGG SER-IMP: NEGATIVE — SIGNIFICANT CHANGE UP
SODIUM SERPL-SCNC: 142 MMOL/L — SIGNIFICANT CHANGE UP (ref 135–145)
SODIUM SERPL-SCNC: 142 MMOL/L — SIGNIFICANT CHANGE UP (ref 135–145)

## 2023-12-05 PROCEDURE — 73219 MRI UPPER EXTREMITY W/DYE: CPT | Mod: 26,RT

## 2023-12-05 PROCEDURE — 99232 SBSQ HOSP IP/OBS MODERATE 35: CPT

## 2023-12-05 RX ORDER — POTASSIUM CHLORIDE 20 MEQ
20 PACKET (EA) ORAL
Refills: 0 | Status: COMPLETED | OUTPATIENT
Start: 2023-12-05 | End: 2023-12-06

## 2023-12-05 RX ORDER — SODIUM CHLORIDE 9 MG/ML
1000 INJECTION INTRAMUSCULAR; INTRAVENOUS; SUBCUTANEOUS
Refills: 0 | Status: DISCONTINUED | OUTPATIENT
Start: 2023-12-05 | End: 2023-12-08

## 2023-12-05 RX ORDER — LISINOPRIL 2.5 MG/1
5 TABLET ORAL DAILY
Refills: 0 | Status: DISCONTINUED | OUTPATIENT
Start: 2023-12-05 | End: 2023-12-12

## 2023-12-05 RX ORDER — POTASSIUM CHLORIDE 20 MEQ
40 PACKET (EA) ORAL ONCE
Refills: 0 | Status: COMPLETED | OUTPATIENT
Start: 2023-12-05 | End: 2023-12-05

## 2023-12-05 RX ADMIN — PREGABALIN 1000 MICROGRAM(S): 225 CAPSULE ORAL at 07:52

## 2023-12-05 RX ADMIN — Medication 40 MILLIEQUIVALENT(S): at 09:42

## 2023-12-05 RX ADMIN — Medication 120 MEQ/KG/HR: at 05:06

## 2023-12-05 RX ADMIN — ENOXAPARIN SODIUM 40 MILLIGRAM(S): 100 INJECTION SUBCUTANEOUS at 05:07

## 2023-12-05 RX ADMIN — SODIUM CHLORIDE 120 MILLILITER(S): 9 INJECTION INTRAMUSCULAR; INTRAVENOUS; SUBCUTANEOUS at 09:42

## 2023-12-05 RX ADMIN — SODIUM CHLORIDE 120 MILLILITER(S): 9 INJECTION INTRAMUSCULAR; INTRAVENOUS; SUBCUTANEOUS at 22:33

## 2023-12-05 RX ADMIN — PANTOPRAZOLE SODIUM 40 MILLIGRAM(S): 20 TABLET, DELAYED RELEASE ORAL at 05:07

## 2023-12-05 RX ADMIN — Medication 60 MILLIGRAM(S): at 05:07

## 2023-12-05 RX ADMIN — Medication 1 MILLIGRAM(S): at 07:52

## 2023-12-05 NOTE — PROGRESS NOTE ADULT - ASSESSMENT
82 yo M with HTN , hyperlipidemia presented with shoulder arm pain , weakness of arms and lower leg high CPK       1- muscle weakness, myopathy suspected   inflamation vs infection   rheumotogy consult appreciated   cont po prednisone   serology work up is P   cMR of arm to determine need for muscle biopsy    cont  na bicarbonate iv infusion     2- rhabdomyolysis   cont IVF with bicarbonate   nephrology follow up , cr is stable   CPK is trending down   cont to hold lipitor       3- Hypokalemia , recurrent   cont to replace keep over 4     4-HTN   resume lisinopril   cr is stable     DVT prophylaxis        84 yo M with HTN , hyperlipidemia presented with shoulder arm pain , weakness of arms and lower leg high CPK       1- muscle weakness, myopathy suspected   inflamation vs infection   rheumotogy consult appreciated   cont po prednisone   serology work up is P   cMR of arm to determine need for muscle biopsy    cont  na bicarbonate iv infusion     2- rhabdomyolysis   cont IVF with bicarbonate   nephrology follow up , cr is stable   CPK is trending down   cont to hold lipitor       3- Hypokalemia , recurrent   cont to replace keep over 4     4-HTN   resume lisinopril   cr is stable     DVT prophylaxis

## 2023-12-05 NOTE — PROGRESS NOTE ADULT - ASSESSMENT
Acute(?) rhabdo w/o renal failure  UA: + blood + protein   r/o underlying Rheumatologic or Endocrinologic etiology  TFTs, cortisol normal  CPKs trending down  - avoid potential nephrotoxins  - cont IVF, bicarb d/c'd  - await MRI  Rheum noted---> serologies pending; possible muscle biopsy  - follow labs

## 2023-12-05 NOTE — PROGRESS NOTE ADULT - SUBJECTIVE AND OBJECTIVE BOX
NEPHROLOGY INTERVAL HPI/OVERNIGHT EVENTS:  pt stable clinically  still with arm and leg weakness    MEDICATIONS  (STANDING):  cyanocobalamin 1000 MICROGram(s) Oral daily  enoxaparin Injectable 40 milliGRAM(s) SubCutaneous every 24 hours  folic acid 1 milliGRAM(s) Oral daily  pantoprazole    Tablet 40 milliGRAM(s) Oral before breakfast  predniSONE   Tablet 60 milliGRAM(s) Oral daily  sodium chloride 0.9%. 1000 milliLiter(s) (120 mL/Hr) IV Continuous <Continuous>    MEDICATIONS  (PRN):      Allergies    No Known Allergies        Vital Signs Last 24 Hrs  T(C): 36.4 (05 Dec 2023 08:02), Max: 36.6 (04 Dec 2023 16:04)  T(F): 97.6 (05 Dec 2023 08:02), Max: 97.9 (04 Dec 2023 16:34)  HR: 67 (05 Dec 2023 08:02) (64 - 71)  BP: 161/77 (05 Dec 2023 08:02) (135/79 - 161/77)  BP(mean): --  RR: 18 (05 Dec 2023 08:02) (17 - 18)  SpO2: 93% (05 Dec 2023 08:02) (92% - 95%)    Parameters below as of 05 Dec 2023 08:02  Patient On (Oxygen Delivery Method): room air        PHYSICAL EXAM:  GENERAL: Weak  HEENT: No periorbital edema  NECK: Supple, No JVD  NERVOUS SYSTEM:  Alert & Oriented X3, intact and symmetric  CHEST/LUNG: Clear bilaterally  HEART: Regular rate and rhythm; No rub  ABDOMEN: Soft, Nontender, +BS  EXTREMITIES: + LE edema; no specific muscle tenderness  SKIN: No rashes or lesions    LABS:                        10.3   14.06 )-----------( 350      ( 04 Dec 2023 03:06 )             30.0     12-05    142  |  100  |  13.0  ----------------------------<  84  3.3<L>   |  30.0<H>  |  0.48<L>    Ca    8.6      05 Dec 2023 04:50  Phos  2.8     12-05  Mg     2.0     12-05    TPro  5.0<L>  /  Alb  2.4<L>  /  TBili  0.3<L>  /  DBili  x   /  AST  506<H>  /  ALT  478<H>  /  AlkPhos  116  12-04      Creatine Kinase, Serum: 8991 U/L (12.05.23)    Urinalysis Basic - ( 05 Dec 2023 04:50 )    Color: x / Appearance: x / SG: x / pH: x  Gluc: 84 mg/dL / Ketone: x  / Bili: x / Urobili: x   Blood: x / Protein: x / Nitrite: x   Leuk Esterase: x / RBC: x / WBC x   Sq Epi: x / Non Sq Epi: x / Bacteria: x      Phosphorus: 2.8 mg/dL (12-05 @ 04:50)  Magnesium: 2.0 mg/dL (12-05 @ 04:50)      RADIOLOGY & ADDITIONAL TESTS:  < from: Xray Chest 1 View-PORTABLE IMMEDIATE (Xray Chest 1 View-PORTABLE IMMEDIATE .) (12.03.23 @ 09:31) >  ACC: [id number] EXAM:  XR CHEST PORTABLE IMMED 1V   ORDERED BY: CONTRERAS CARMONA     PROCEDURE DATE:  12/03/2023          INTERPRETATION:  XR CHEST IMMEDIATE dated 12/3/2023 9:31 AM    CLINICAL INFORMATION: Male, 83 years old.  SOB.    PRIOR STUDIES: 12/2/2023    FINDINGS/  IMPRESSION: Heart size, mediastinal and hilar contours are unchanged and   within normal limits. There may be new mild pulmonary venous congestion.   No large effusions or pneumothorax. Degenerative changes involving both   glenohumeral joints    < end of copied text >   NEPHROLOGY INTERVAL HPI/OVERNIGHT EVENTS:  pt stable clinically  still with arm and leg weakness    MEDICATIONS  (STANDING):  cyanocobalamin 1000 MICROGram(s) Oral daily  enoxaparin Injectable 40 milliGRAM(s) SubCutaneous every 24 hours  folic acid 1 milliGRAM(s) Oral daily  pantoprazole    Tablet 40 milliGRAM(s) Oral before breakfast  predniSONE   Tablet 60 milliGRAM(s) Oral daily  sodium chloride 0.9%. 1000 milliLiter(s) (120 mL/Hr) IV Continuous <Continuous>    MEDICATIONS  (PRN):      Allergies    No Known Allergies        Vital Signs Last 24 Hrs  T(C): 36.4 (05 Dec 2023 08:02), Max: 36.6 (04 Dec 2023 16:04)  T(F): 97.6 (05 Dec 2023 08:02), Max: 97.9 (04 Dec 2023 16:34)  HR: 67 (05 Dec 2023 08:02) (64 - 71)  BP: 161/77 (05 Dec 2023 08:02) (135/79 - 161/77)  BP(mean): --  RR: 18 (05 Dec 2023 08:02) (17 - 18)  SpO2: 93% (05 Dec 2023 08:02) (92% - 95%)    Parameters below as of 05 Dec 2023 08:02  Patient On (Oxygen Delivery Method): room air        PHYSICAL EXAM:  GENERAL: Weak  HEENT: No periorbital edema  NECK: Supple, No JVD  NERVOUS SYSTEM:  Alert & Oriented X3, intact and symmetric  CHEST/LUNG: Clear bilaterally  HEART: Regular rate and rhythm; No rub  ABDOMEN: Soft, Nontender, +BS  EXTREMITIES: + LE edema; no specific muscle tenderness  SKIN: No rashes or lesions    LABS:                        10.3   14.06 )-----------( 350      ( 04 Dec 2023 03:06 )             30.0     12-05    142  |  100  |  13.0  ----------------------------<  84  3.3<L>   |  30.0<H>  |  0.48<L>    Ca    8.6      05 Dec 2023 04:50  Phos  2.8     12-05  Mg     2.0     12-05    TPro  5.0<L>  /  Alb  2.4<L>  /  TBili  0.3<L>  /  DBili  x   /  AST  506<H>  /  ALT  478<H>  /  AlkPhos  116  12-04      Creatine Kinase, Serum: 8991 U/L (12.05.23)    Urinalysis Basic - ( 05 Dec 2023 04:50 )    Color: x / Appearance: x / SG: x / pH: x  Gluc: 84 mg/dL / Ketone: x  / Bili: x / Urobili: x   Blood: x / Protein: x / Nitrite: x   Leuk Esterase: x / RBC: x / WBC x   Sq Epi: x / Non Sq Epi: x / Bacteria: x      Phosphorus: 2.8 mg/dL (12-05 @ 04:50)  Magnesium: 2.0 mg/dL (12-05 @ 04:50)      RADIOLOGY & ADDITIONAL TESTS:  < from: Xray Chest 1 View-PORTABLE IMMEDIATE (Xray Chest 1 View-PORTABLE IMMEDIATE .) (12.03.23 @ 09:31) >  ACC: [id number] EXAM:  XR CHEST PORTABLE IMMED 1V   ORDERED BY: CONTRERAS CARMONA     PROCEDURE DATE:  12/03/2023          INTERPRETATION:  XR CHEST IMMEDIATE dated 12/3/2023 9:31 AM    CLINICAL INFORMATION: Male, 83 years old.  SOB.    PRIOR STUDIES: 12/2/2023    FINDINGS/  IMPRESSION: Heart size, mediastinal and hilar contours are unchanged and   within normal limits. There may be new mild pulmonary venous congestion.   No large effusions or pneumothorax. Degenerative changes involving both   glenohumeral joints    < end of copied text >

## 2023-12-06 LAB
ALBUMIN SERPL ELPH-MCNC: 2.1 G/DL — LOW (ref 3.3–5.2)
ALBUMIN SERPL ELPH-MCNC: 2.1 G/DL — LOW (ref 3.3–5.2)
ALBUMIN SERPL ELPH-MCNC: 2.7 G/DL — LOW (ref 3.3–5.2)
ALBUMIN SERPL ELPH-MCNC: 2.7 G/DL — LOW (ref 3.3–5.2)
ALP SERPL-CCNC: 126 U/L — HIGH (ref 40–120)
ALP SERPL-CCNC: 126 U/L — HIGH (ref 40–120)
ALP SERPL-CCNC: 92 U/L — SIGNIFICANT CHANGE UP (ref 40–120)
ALP SERPL-CCNC: 92 U/L — SIGNIFICANT CHANGE UP (ref 40–120)
ALT FLD-CCNC: 374 U/L — HIGH
ALT FLD-CCNC: 374 U/L — HIGH
ALT FLD-CCNC: 487 U/L — HIGH
ALT FLD-CCNC: 487 U/L — HIGH
ANION GAP SERPL CALC-SCNC: 11 MMOL/L — SIGNIFICANT CHANGE UP (ref 5–17)
ANION GAP SERPL CALC-SCNC: 11 MMOL/L — SIGNIFICANT CHANGE UP (ref 5–17)
ANION GAP SERPL CALC-SCNC: 9 MMOL/L — SIGNIFICANT CHANGE UP (ref 5–17)
ANION GAP SERPL CALC-SCNC: 9 MMOL/L — SIGNIFICANT CHANGE UP (ref 5–17)
ANISOCYTOSIS BLD QL: SLIGHT — SIGNIFICANT CHANGE UP
ANISOCYTOSIS BLD QL: SLIGHT — SIGNIFICANT CHANGE UP
APTT BLD: 31 SEC — SIGNIFICANT CHANGE UP (ref 24.5–35.6)
APTT BLD: 31 SEC — SIGNIFICANT CHANGE UP (ref 24.5–35.6)
AST SERPL-CCNC: 330 U/L — HIGH
AST SERPL-CCNC: 330 U/L — HIGH
AST SERPL-CCNC: 424 U/L — HIGH
AST SERPL-CCNC: 424 U/L — HIGH
BASOPHILS # BLD AUTO: 0.06 K/UL — SIGNIFICANT CHANGE UP (ref 0–0.2)
BASOPHILS # BLD AUTO: 0.06 K/UL — SIGNIFICANT CHANGE UP (ref 0–0.2)
BASOPHILS # BLD AUTO: 0.2 K/UL — SIGNIFICANT CHANGE UP (ref 0–0.2)
BASOPHILS # BLD AUTO: 0.2 K/UL — SIGNIFICANT CHANGE UP (ref 0–0.2)
BASOPHILS NFR BLD AUTO: 0.3 % — SIGNIFICANT CHANGE UP (ref 0–2)
BASOPHILS NFR BLD AUTO: 0.3 % — SIGNIFICANT CHANGE UP (ref 0–2)
BASOPHILS NFR BLD AUTO: 1.7 % — SIGNIFICANT CHANGE UP (ref 0–2)
BASOPHILS NFR BLD AUTO: 1.7 % — SIGNIFICANT CHANGE UP (ref 0–2)
BILIRUB SERPL-MCNC: 0.3 MG/DL — LOW (ref 0.4–2)
BILIRUB SERPL-MCNC: 0.3 MG/DL — LOW (ref 0.4–2)
BILIRUB SERPL-MCNC: 0.5 MG/DL — SIGNIFICANT CHANGE UP (ref 0.4–2)
BILIRUB SERPL-MCNC: 0.5 MG/DL — SIGNIFICANT CHANGE UP (ref 0.4–2)
BUN SERPL-MCNC: 13.4 MG/DL — SIGNIFICANT CHANGE UP (ref 8–20)
BUN SERPL-MCNC: 13.4 MG/DL — SIGNIFICANT CHANGE UP (ref 8–20)
BUN SERPL-MCNC: 16.2 MG/DL — SIGNIFICANT CHANGE UP (ref 8–20)
BUN SERPL-MCNC: 16.2 MG/DL — SIGNIFICANT CHANGE UP (ref 8–20)
BURR CELLS BLD QL SMEAR: PRESENT — SIGNIFICANT CHANGE UP
BURR CELLS BLD QL SMEAR: PRESENT — SIGNIFICANT CHANGE UP
CALCIUM SERPL-MCNC: 5.7 MG/DL — CRITICAL LOW (ref 8.4–10.5)
CALCIUM SERPL-MCNC: 5.7 MG/DL — CRITICAL LOW (ref 8.4–10.5)
CALCIUM SERPL-MCNC: 7.8 MG/DL — LOW (ref 8.4–10.5)
CALCIUM SERPL-MCNC: 7.8 MG/DL — LOW (ref 8.4–10.5)
CHLORIDE SERPL-SCNC: 105 MMOL/L — SIGNIFICANT CHANGE UP (ref 96–108)
CHLORIDE SERPL-SCNC: 105 MMOL/L — SIGNIFICANT CHANGE UP (ref 96–108)
CHLORIDE SERPL-SCNC: 72 MMOL/L — LOW (ref 96–108)
CHLORIDE SERPL-SCNC: 72 MMOL/L — LOW (ref 96–108)
CK MB CFR SERPL CALC: 47.7 NG/ML — HIGH (ref 0–6.7)
CK MB CFR SERPL CALC: 47.7 NG/ML — HIGH (ref 0–6.7)
CK MB CFR SERPL CALC: 69.2 NG/ML — HIGH (ref 0–6.7)
CK MB CFR SERPL CALC: 69.2 NG/ML — HIGH (ref 0–6.7)
CK SERPL-CCNC: 5099 U/L — HIGH (ref 30–200)
CK SERPL-CCNC: 5099 U/L — HIGH (ref 30–200)
CK SERPL-CCNC: 6819 U/L — HIGH (ref 30–200)
CK SERPL-CCNC: 6819 U/L — HIGH (ref 30–200)
CO2 SERPL-SCNC: 16 MMOL/L — LOW (ref 22–29)
CO2 SERPL-SCNC: 16 MMOL/L — LOW (ref 22–29)
CO2 SERPL-SCNC: 23 MMOL/L — SIGNIFICANT CHANGE UP (ref 22–29)
CO2 SERPL-SCNC: 23 MMOL/L — SIGNIFICANT CHANGE UP (ref 22–29)
CREAT SERPL-MCNC: 0.3 MG/DL — LOW (ref 0.5–1.3)
CREAT SERPL-MCNC: 0.3 MG/DL — LOW (ref 0.5–1.3)
CREAT SERPL-MCNC: 0.64 MG/DL — SIGNIFICANT CHANGE UP (ref 0.5–1.3)
CREAT SERPL-MCNC: 0.64 MG/DL — SIGNIFICANT CHANGE UP (ref 0.5–1.3)
DACRYOCYTES BLD QL SMEAR: SLIGHT — SIGNIFICANT CHANGE UP
DACRYOCYTES BLD QL SMEAR: SLIGHT — SIGNIFICANT CHANGE UP
EGFR: 119 ML/MIN/1.73M2 — SIGNIFICANT CHANGE UP
EGFR: 119 ML/MIN/1.73M2 — SIGNIFICANT CHANGE UP
EGFR: 95 ML/MIN/1.73M2 — SIGNIFICANT CHANGE UP
EGFR: 95 ML/MIN/1.73M2 — SIGNIFICANT CHANGE UP
EOSINOPHIL # BLD AUTO: 0 K/UL — SIGNIFICANT CHANGE UP (ref 0–0.5)
EOSINOPHIL # BLD AUTO: 0 K/UL — SIGNIFICANT CHANGE UP (ref 0–0.5)
EOSINOPHIL # BLD AUTO: 0.02 K/UL — SIGNIFICANT CHANGE UP (ref 0–0.5)
EOSINOPHIL # BLD AUTO: 0.02 K/UL — SIGNIFICANT CHANGE UP (ref 0–0.5)
EOSINOPHIL NFR BLD AUTO: 0 % — SIGNIFICANT CHANGE UP (ref 0–6)
EOSINOPHIL NFR BLD AUTO: 0 % — SIGNIFICANT CHANGE UP (ref 0–6)
EOSINOPHIL NFR BLD AUTO: 0.1 % — SIGNIFICANT CHANGE UP (ref 0–6)
EOSINOPHIL NFR BLD AUTO: 0.1 % — SIGNIFICANT CHANGE UP (ref 0–6)
GIANT PLATELETS BLD QL SMEAR: PRESENT — SIGNIFICANT CHANGE UP
GIANT PLATELETS BLD QL SMEAR: PRESENT — SIGNIFICANT CHANGE UP
GLUCOSE SERPL-MCNC: 125 MG/DL — HIGH (ref 70–99)
GLUCOSE SERPL-MCNC: 125 MG/DL — HIGH (ref 70–99)
GLUCOSE SERPL-MCNC: 82 MG/DL — SIGNIFICANT CHANGE UP (ref 70–99)
GLUCOSE SERPL-MCNC: 82 MG/DL — SIGNIFICANT CHANGE UP (ref 70–99)
HCT VFR BLD CALC: 24.6 % — LOW (ref 39–50)
HCT VFR BLD CALC: 24.6 % — LOW (ref 39–50)
HCT VFR BLD CALC: 31.7 % — LOW (ref 39–50)
HCT VFR BLD CALC: 31.7 % — LOW (ref 39–50)
HGB BLD-MCNC: 10.6 G/DL — LOW (ref 13–17)
HGB BLD-MCNC: 10.6 G/DL — LOW (ref 13–17)
HGB BLD-MCNC: 8.6 G/DL — LOW (ref 13–17)
HGB BLD-MCNC: 8.6 G/DL — LOW (ref 13–17)
HYPOCHROMIA BLD QL: SLIGHT — SIGNIFICANT CHANGE UP
HYPOCHROMIA BLD QL: SLIGHT — SIGNIFICANT CHANGE UP
IMM GRANULOCYTES NFR BLD AUTO: 1 % — HIGH (ref 0–0.9)
IMM GRANULOCYTES NFR BLD AUTO: 1 % — HIGH (ref 0–0.9)
INR BLD: 1.16 RATIO — SIGNIFICANT CHANGE UP (ref 0.85–1.18)
INR BLD: 1.16 RATIO — SIGNIFICANT CHANGE UP (ref 0.85–1.18)
LACTATE SERPL-SCNC: 2 MMOL/L — SIGNIFICANT CHANGE UP (ref 0.5–2)
LACTATE SERPL-SCNC: 2 MMOL/L — SIGNIFICANT CHANGE UP (ref 0.5–2)
LACTATE SERPL-SCNC: 2.2 MMOL/L — HIGH (ref 0.5–2)
LACTATE SERPL-SCNC: 2.2 MMOL/L — HIGH (ref 0.5–2)
LYMPHOCYTES # BLD AUTO: 0.42 K/UL — LOW (ref 1–3.3)
LYMPHOCYTES # BLD AUTO: 0.42 K/UL — LOW (ref 1–3.3)
LYMPHOCYTES # BLD AUTO: 0.94 K/UL — LOW (ref 1–3.3)
LYMPHOCYTES # BLD AUTO: 0.94 K/UL — LOW (ref 1–3.3)
LYMPHOCYTES # BLD AUTO: 3.5 % — LOW (ref 13–44)
LYMPHOCYTES # BLD AUTO: 3.5 % — LOW (ref 13–44)
LYMPHOCYTES # BLD AUTO: 5.4 % — LOW (ref 13–44)
LYMPHOCYTES # BLD AUTO: 5.4 % — LOW (ref 13–44)
MANUAL SMEAR VERIFICATION: SIGNIFICANT CHANGE UP
MANUAL SMEAR VERIFICATION: SIGNIFICANT CHANGE UP
MCHC RBC-ENTMCNC: 31.7 PG — SIGNIFICANT CHANGE UP (ref 27–34)
MCHC RBC-ENTMCNC: 31.7 PG — SIGNIFICANT CHANGE UP (ref 27–34)
MCHC RBC-ENTMCNC: 32.5 PG — SIGNIFICANT CHANGE UP (ref 27–34)
MCHC RBC-ENTMCNC: 32.5 PG — SIGNIFICANT CHANGE UP (ref 27–34)
MCHC RBC-ENTMCNC: 33.4 GM/DL — SIGNIFICANT CHANGE UP (ref 32–36)
MCHC RBC-ENTMCNC: 33.4 GM/DL — SIGNIFICANT CHANGE UP (ref 32–36)
MCHC RBC-ENTMCNC: 35 GM/DL — SIGNIFICANT CHANGE UP (ref 32–36)
MCHC RBC-ENTMCNC: 35 GM/DL — SIGNIFICANT CHANGE UP (ref 32–36)
MCV RBC AUTO: 92.8 FL — SIGNIFICANT CHANGE UP (ref 80–100)
MCV RBC AUTO: 92.8 FL — SIGNIFICANT CHANGE UP (ref 80–100)
MCV RBC AUTO: 94.9 FL — SIGNIFICANT CHANGE UP (ref 80–100)
MCV RBC AUTO: 94.9 FL — SIGNIFICANT CHANGE UP (ref 80–100)
MONOCYTES # BLD AUTO: 0.53 K/UL — SIGNIFICANT CHANGE UP (ref 0–0.9)
MONOCYTES # BLD AUTO: 0.53 K/UL — SIGNIFICANT CHANGE UP (ref 0–0.9)
MONOCYTES # BLD AUTO: 1.38 K/UL — HIGH (ref 0–0.9)
MONOCYTES # BLD AUTO: 1.38 K/UL — HIGH (ref 0–0.9)
MONOCYTES NFR BLD AUTO: 4.4 % — SIGNIFICANT CHANGE UP (ref 2–14)
MONOCYTES NFR BLD AUTO: 4.4 % — SIGNIFICANT CHANGE UP (ref 2–14)
MONOCYTES NFR BLD AUTO: 8 % — SIGNIFICANT CHANGE UP (ref 2–14)
MONOCYTES NFR BLD AUTO: 8 % — SIGNIFICANT CHANGE UP (ref 2–14)
MYOGLOBIN SERPL-MCNC: 6636 NG/ML — HIGH (ref 28–72)
MYOGLOBIN SERPL-MCNC: 6636 NG/ML — HIGH (ref 28–72)
NEUTROPHILS # BLD AUTO: 10.59 K/UL — HIGH (ref 1.8–7.4)
NEUTROPHILS # BLD AUTO: 10.59 K/UL — HIGH (ref 1.8–7.4)
NEUTROPHILS # BLD AUTO: 14.74 K/UL — HIGH (ref 1.8–7.4)
NEUTROPHILS # BLD AUTO: 14.74 K/UL — HIGH (ref 1.8–7.4)
NEUTROPHILS NFR BLD AUTO: 85.2 % — HIGH (ref 43–77)
NEUTROPHILS NFR BLD AUTO: 85.2 % — HIGH (ref 43–77)
NEUTROPHILS NFR BLD AUTO: 86.8 % — HIGH (ref 43–77)
NEUTROPHILS NFR BLD AUTO: 86.8 % — HIGH (ref 43–77)
NEUTS BAND # BLD: 1.8 % — SIGNIFICANT CHANGE UP (ref 0–8)
NEUTS BAND # BLD: 1.8 % — SIGNIFICANT CHANGE UP (ref 0–8)
OVALOCYTES BLD QL SMEAR: SLIGHT — SIGNIFICANT CHANGE UP
OVALOCYTES BLD QL SMEAR: SLIGHT — SIGNIFICANT CHANGE UP
PLAT MORPH BLD: NORMAL — SIGNIFICANT CHANGE UP
PLAT MORPH BLD: NORMAL — SIGNIFICANT CHANGE UP
PLATELET # BLD AUTO: 296 K/UL — SIGNIFICANT CHANGE UP (ref 150–400)
PLATELET # BLD AUTO: 296 K/UL — SIGNIFICANT CHANGE UP (ref 150–400)
PLATELET # BLD AUTO: 373 K/UL — SIGNIFICANT CHANGE UP (ref 150–400)
PLATELET # BLD AUTO: 373 K/UL — SIGNIFICANT CHANGE UP (ref 150–400)
POIKILOCYTOSIS BLD QL AUTO: SIGNIFICANT CHANGE UP
POIKILOCYTOSIS BLD QL AUTO: SIGNIFICANT CHANGE UP
POLYCHROMASIA BLD QL SMEAR: SLIGHT — SIGNIFICANT CHANGE UP
POLYCHROMASIA BLD QL SMEAR: SLIGHT — SIGNIFICANT CHANGE UP
POTASSIUM SERPL-MCNC: 3.6 MMOL/L — SIGNIFICANT CHANGE UP (ref 3.5–5.3)
POTASSIUM SERPL-MCNC: 3.6 MMOL/L — SIGNIFICANT CHANGE UP (ref 3.5–5.3)
POTASSIUM SERPL-MCNC: 4.7 MMOL/L — SIGNIFICANT CHANGE UP (ref 3.5–5.3)
POTASSIUM SERPL-MCNC: 4.7 MMOL/L — SIGNIFICANT CHANGE UP (ref 3.5–5.3)
POTASSIUM SERPL-SCNC: 3.6 MMOL/L — SIGNIFICANT CHANGE UP (ref 3.5–5.3)
POTASSIUM SERPL-SCNC: 3.6 MMOL/L — SIGNIFICANT CHANGE UP (ref 3.5–5.3)
POTASSIUM SERPL-SCNC: 4.7 MMOL/L — SIGNIFICANT CHANGE UP (ref 3.5–5.3)
POTASSIUM SERPL-SCNC: 4.7 MMOL/L — SIGNIFICANT CHANGE UP (ref 3.5–5.3)
PROCALCITONIN SERPL-MCNC: 0.06 NG/ML — SIGNIFICANT CHANGE UP (ref 0.02–0.1)
PROCALCITONIN SERPL-MCNC: 0.06 NG/ML — SIGNIFICANT CHANGE UP (ref 0.02–0.1)
PROCALCITONIN SERPL-MCNC: 0.11 NG/ML — HIGH (ref 0.02–0.1)
PROCALCITONIN SERPL-MCNC: 0.11 NG/ML — HIGH (ref 0.02–0.1)
PROMYELOCYTES # FLD: 0.9 % — HIGH (ref 0–0)
PROMYELOCYTES # FLD: 0.9 % — HIGH (ref 0–0)
PROT SERPL-MCNC: 4.2 G/DL — LOW (ref 6.6–8.7)
PROT SERPL-MCNC: 4.2 G/DL — LOW (ref 6.6–8.7)
PROT SERPL-MCNC: 5.4 G/DL — LOW (ref 6.6–8.7)
PROT SERPL-MCNC: 5.4 G/DL — LOW (ref 6.6–8.7)
PROTHROM AB SERPL-ACNC: 12.8 SEC — SIGNIFICANT CHANGE UP (ref 9.5–13)
PROTHROM AB SERPL-ACNC: 12.8 SEC — SIGNIFICANT CHANGE UP (ref 9.5–13)
RBC # BLD: 2.65 M/UL — LOW (ref 4.2–5.8)
RBC # BLD: 2.65 M/UL — LOW (ref 4.2–5.8)
RBC # BLD: 3.34 M/UL — LOW (ref 4.2–5.8)
RBC # BLD: 3.34 M/UL — LOW (ref 4.2–5.8)
RBC # FLD: 14.1 % — SIGNIFICANT CHANGE UP (ref 10.3–14.5)
RBC # FLD: 14.1 % — SIGNIFICANT CHANGE UP (ref 10.3–14.5)
RBC # FLD: 14.2 % — SIGNIFICANT CHANGE UP (ref 10.3–14.5)
RBC # FLD: 14.2 % — SIGNIFICANT CHANGE UP (ref 10.3–14.5)
RBC BLD AUTO: ABNORMAL
RBC BLD AUTO: ABNORMAL
RF IGA SER-ACNC: <5 U — SIGNIFICANT CHANGE UP
RF IGA SER-ACNC: <5 U — SIGNIFICANT CHANGE UP
RF IGG SER-ACNC: <5 U — SIGNIFICANT CHANGE UP
RF IGG SER-ACNC: <5 U — SIGNIFICANT CHANGE UP
RF IGM SER-ACNC: <5 U — SIGNIFICANT CHANGE UP
RF IGM SER-ACNC: <5 U — SIGNIFICANT CHANGE UP
SMUDGE CELLS # BLD: PRESENT — SIGNIFICANT CHANGE UP
SMUDGE CELLS # BLD: PRESENT — SIGNIFICANT CHANGE UP
SODIUM SERPL-SCNC: 137 MMOL/L — SIGNIFICANT CHANGE UP (ref 135–145)
SODIUM SERPL-SCNC: 137 MMOL/L — SIGNIFICANT CHANGE UP (ref 135–145)
SODIUM SERPL-SCNC: 99 MMOL/L — CRITICAL LOW (ref 135–145)
SODIUM SERPL-SCNC: 99 MMOL/L — CRITICAL LOW (ref 135–145)
VARIANT LYMPHS # BLD: 0.9 % — SIGNIFICANT CHANGE UP (ref 0–6)
VARIANT LYMPHS # BLD: 0.9 % — SIGNIFICANT CHANGE UP (ref 0–6)
WBC # BLD: 11.95 K/UL — HIGH (ref 3.8–10.5)
WBC # BLD: 11.95 K/UL — HIGH (ref 3.8–10.5)
WBC # BLD: 17.31 K/UL — HIGH (ref 3.8–10.5)
WBC # BLD: 17.31 K/UL — HIGH (ref 3.8–10.5)
WBC # FLD AUTO: 11.95 K/UL — HIGH (ref 3.8–10.5)
WBC # FLD AUTO: 11.95 K/UL — HIGH (ref 3.8–10.5)
WBC # FLD AUTO: 17.31 K/UL — HIGH (ref 3.8–10.5)
WBC # FLD AUTO: 17.31 K/UL — HIGH (ref 3.8–10.5)

## 2023-12-06 PROCEDURE — 93010 ELECTROCARDIOGRAM REPORT: CPT

## 2023-12-06 PROCEDURE — 71045 X-RAY EXAM CHEST 1 VIEW: CPT | Mod: 26

## 2023-12-06 PROCEDURE — 71250 CT THORAX DX C-: CPT | Mod: 26

## 2023-12-06 PROCEDURE — 74176 CT ABD & PELVIS W/O CONTRAST: CPT | Mod: 26

## 2023-12-06 PROCEDURE — 99232 SBSQ HOSP IP/OBS MODERATE 35: CPT

## 2023-12-06 RX ORDER — SODIUM CHLORIDE 9 MG/ML
2400 INJECTION INTRAMUSCULAR; INTRAVENOUS; SUBCUTANEOUS ONCE
Refills: 0 | Status: COMPLETED | OUTPATIENT
Start: 2023-12-06 | End: 2023-12-06

## 2023-12-06 RX ORDER — PIPERACILLIN AND TAZOBACTAM 4; .5 G/20ML; G/20ML
3.38 INJECTION, POWDER, LYOPHILIZED, FOR SOLUTION INTRAVENOUS EVERY 8 HOURS
Refills: 0 | Status: DISCONTINUED | OUTPATIENT
Start: 2023-12-06 | End: 2023-12-06

## 2023-12-06 RX ORDER — ACETAMINOPHEN 500 MG
1000 TABLET ORAL ONCE
Refills: 0 | Status: COMPLETED | OUTPATIENT
Start: 2023-12-06 | End: 2023-12-06

## 2023-12-06 RX ORDER — PIPERACILLIN AND TAZOBACTAM 4; .5 G/20ML; G/20ML
3.38 INJECTION, POWDER, LYOPHILIZED, FOR SOLUTION INTRAVENOUS ONCE
Refills: 0 | Status: DISCONTINUED | OUTPATIENT
Start: 2023-12-06 | End: 2023-12-06

## 2023-12-06 RX ORDER — VANCOMYCIN HCL 1 G
1000 VIAL (EA) INTRAVENOUS ONCE
Refills: 0 | Status: COMPLETED | OUTPATIENT
Start: 2023-12-06 | End: 2023-12-06

## 2023-12-06 RX ORDER — PIPERACILLIN AND TAZOBACTAM 4; .5 G/20ML; G/20ML
3.38 INJECTION, POWDER, LYOPHILIZED, FOR SOLUTION INTRAVENOUS ONCE
Refills: 0 | Status: COMPLETED | OUTPATIENT
Start: 2023-12-06 | End: 2023-12-06

## 2023-12-06 RX ADMIN — PREGABALIN 1000 MICROGRAM(S): 225 CAPSULE ORAL at 12:11

## 2023-12-06 RX ADMIN — PANTOPRAZOLE SODIUM 40 MILLIGRAM(S): 20 TABLET, DELAYED RELEASE ORAL at 05:11

## 2023-12-06 RX ADMIN — Medication 400 MILLIGRAM(S): at 03:18

## 2023-12-06 RX ADMIN — Medication 250 MILLIGRAM(S): at 05:10

## 2023-12-06 RX ADMIN — Medication 20 MILLIEQUIVALENT(S): at 00:09

## 2023-12-06 RX ADMIN — LISINOPRIL 5 MILLIGRAM(S): 2.5 TABLET ORAL at 05:11

## 2023-12-06 RX ADMIN — PIPERACILLIN AND TAZOBACTAM 200 GRAM(S): 4; .5 INJECTION, POWDER, LYOPHILIZED, FOR SOLUTION INTRAVENOUS at 03:40

## 2023-12-06 RX ADMIN — Medication 60 MILLIGRAM(S): at 05:11

## 2023-12-06 RX ADMIN — SODIUM CHLORIDE 2400 MILLILITER(S): 9 INJECTION INTRAMUSCULAR; INTRAVENOUS; SUBCUTANEOUS at 03:40

## 2023-12-06 RX ADMIN — ENOXAPARIN SODIUM 40 MILLIGRAM(S): 100 INJECTION SUBCUTANEOUS at 05:11

## 2023-12-06 RX ADMIN — Medication 1000 MILLIGRAM(S): at 03:00

## 2023-12-06 RX ADMIN — Medication 20 MILLIEQUIVALENT(S): at 03:12

## 2023-12-06 RX ADMIN — Medication 1 MILLIGRAM(S): at 12:11

## 2023-12-06 NOTE — PROGRESS NOTE ADULT - SUBJECTIVE AND OBJECTIVE BOX
[facility]                                     Neurology at [place]                                 Doris Coker, & Dar                                  [address]                                        [place], [place], [address]                                             [phone]    CC: weakness  HPI: The patient is a 83y Male who presented with b/l proximal weakness for 3 weeks.  It started in the right arm and proceeded to effect left arm and then legs.  He is unable to raise arms to brush hair or arise from seated position without using hands to push up.  He says it started suddenly 3 weeks ago.  He also noted his urine being dark at roughly the same time.  He has no family history of weakness to suggest a muscular dystrophy and has no physical features of myotonic dystrophy.  he was at [facility] about  a week ago with CPK about 15,000 but apparently signed out AMA prior to evaluation  he returned yesterday because he was not getting better.  Neurology is asked to evaluate.    Interval history: no neuro events, remains with proximal weakness of 4 extremities, has neck soreness    Review of systems (neurology): Denies headache or dizziness. (+) weakness, no numbness.  Denies speech/language deficits. Denies diplopia/blurred vision.  Denies confusion    MEDICATIONS  (STANDING):  cyanocobalamin 1000 MICROGram(s) Oral daily  enoxaparin Injectable 40 milliGRAM(s) SubCutaneous every 24 hours  folic acid 1 milliGRAM(s) Oral daily  lisinopril 5 milliGRAM(s) Oral daily  pantoprazole    Tablet 40 milliGRAM(s) Oral before breakfast  predniSONE   Tablet 60 milliGRAM(s) Oral daily  sodium chloride 0.9%. 1000 milliLiter(s) (120 mL/Hr) IV Continuous <Continuous>    MEDICATIONS  (PRN):      Vital Signs Last 24 Hrs  T(C): 36.7 (06 Dec 2023 08:26), Max: 39.3 (06 Dec 2023 03:00)  T(F): 98.1 (06 Dec 2023 08:26), Max: 102.7 (06 Dec 2023 03:00)  HR: 65 (06 Dec 2023 08:26) (62 - 100)  BP: 149/64 (06 Dec 2023 08:26) (121/66 - 175/82)  BP(mean): --  RR: 18 (06 Dec 2023 08:26) (18 - 40)  SpO2: 95% (06 Dec 2023 08:26) (91% - 99%)    Parameters below as of 06 Dec 2023 08:26  Patient On (Oxygen Delivery Method): room air      Detailed Neurologic Exam:    Mental status: The patient is awake and alert and has normal attention span.  The patient is fully oriented in 3 spheres. The patient is oriented to current events. The patient is able to name objects, follow commands, repeat sentences.    Cranial nerves: Pupils equal and react symmetrically to light. There is no visual field deficit to confrontation. Extraocular motion is full with no nystagmus. There is no ptosis. Facial sensation is intact. Facial musculature is symmetric. Palate elevates symmetrically. Tongue is midline.    Motor: There is normal bulk and tone.  There is no tremor.  Strength is 1-2/5 in the right deltoid unable to abduct well  has 4+/5 distal power in arm. Has 2/5 IPS better (4+/5) in distal right leg.   Strength is 1-2/5 in the left deltoid unable to abduct well  has 4+/5 distal power in arm. Has 1-2/5 IPS better (4+/5) in distal left leg.     Sensation: Intact to light touch and pin in 4 extremities    Reflexes: 2+ patella and biceps and plantar responses are flexor.    Cerebellar: There is no dysmetria on finger to nose testing given limitations of shoulder weakness.    Gait : deferred    LABS:                                             10.6   17.31 )-----------( 373      ( 06 Dec 2023 06:30 )             31.7     12-06    137  |  105  |  16.2  ----------------------------<  125<H>  4.7   |  23.0  |  0.64    Ca    7.8<L>      06 Dec 2023 06:50  Phos  2.8     12-05  Mg     2.0     12-05    TPro  5.4<L>  /  Alb  2.7<L>  /  TBili  0.5  /  DBili  x   /  AST  424<H>  /  ALT  487<H>  /  AlkPhos  126<H>  12-06    LIVER FUNCTIONS - ( 06 Dec 2023 06:50 )  Alb: 2.7 g/dL / Pro: 5.4 g/dL / ALK PHOS: 126 U/L / ALT: 487 U/L / AST: 424 U/L / GGT: x           PT/INR - ( 06 Dec 2023 10:08 )   PT: 12.8 sec;   INR: 1.16 ratio         PTT - ( 06 Dec 2023 10:08 )  PTT:31.0 sec  MOSES-1 Antibody (12.02.23 @ 16:38)   MOSES-1 Antibody: <0.2: Fluorescent Bead Immunoassay   Sedimentation Rate, Erythrocyte (12.02.23 @ 16:38)   Sedimentation Rate, Erythrocyte: 53:   C-Reactive Protein, Serum (12.02.23 @ 16:38)   C-Reactive Protein, Serum: 8 mg/h  Creatine Kinase, Serum (12.06.23 @ 06:30)    Creatine Kinase, Serum: 6819 U/L  Creatine Kinase, Serum in AM (12.03.23 @ 03:33)   Creatine Kinase, Serum: 80387:  CKMB Units (12.03.23 @ 03:33)   CKMB Units: 169.6 ng/mLHepatic Function Panel in AM (12.03.23 @ 03:33)   Myoglobin (12.04.23 @ 03:06)   Myoglobin: 4900 ng/mL  Myoglobin (12.02.23 @ 16:38)   Myoglobin: 6780 ng/mL  Lactate Dehydrogenase, Serum in AM (12.04.23 @ 03:06)   Lactate Dehydrogenase, Serum: 912 U/L  Lactate Dehydrogenase, Serum (12.02.23 @ 16:38)   Lactate Dehydrogenase, Serum: 1208 U/L  Indirect Reacting Bilirubin: 0.3 mg/dL  Protein Total: 5.6 g/dL  Albumin: 2.9 g/dL  Bilirubin Total: 0.4 mg/dL  Bilirubin Direct: 0.1 mg/dL  Alkaline Phosphatase: 136 U/L  Aspartate Aminotransferase (AST/SGOT): 600 U/L  Alanine Aminotransferase (ALT/SGPT): 522 U/L  Gamma Glutamyl Transferase, Serum (12.02.23 @ 16:38)   Gamma Glutamyl Transferase, Serum: 29 U/L  Transferrin, Serum (12.02.23 @ 16:38)   Transferrin, Serum: 193 mg/dL  Ferritin (12.02.23 @ 16:38)   Rheumatoid Factor Quant, Serum or Plasma (12.02.23 @ 16:38)   Rheumatoid Factor Quant, Serum or Plasma: <10: Test Repeated IU/mLFerritin: 620 ng/mLVitamin  Iron with Total Binding Capacity (12.02.23 @ 16:38)   Iron - Total Binding Capacity.: 276 ug/dL  % Saturation, Iron: 13 %  Iron Total: 36 ug/dL   B12, Serum (12.02.23 @ 16:38)   Vitamin B12, Serum: 340 pg/mL  Rheumatoid Factor Quant, Serum or Plasma (12.02.23 @ 16:38)   Rheumatoid Factor Quant, Serum or Plasma: <10: Test Repeated IU/mL  Urinalysis + Microscopic Examination (12.02.23 @ 15:00)   pH Urine: 5.5  Urine Appearance: Turbid  Color: Dark Yellow  Specific Gravity: 1.027  Protein, Urine: 100 mg/dL  Glucose Qualitative, Urine: Negative mg/dL  Ketone - Urine: Negative mg/dL  Blood, Urine: Large  Bilirubin: Small  Urobilinogen: 1.0 mg/dL  Leukocyte Esterase Concentration: Small  Nitrite: Negative  White Blood Cell - Urine: 1 /HPF  Red Blood Cell - Urine: 272 /HPF  Bacteria: Negative /HPF  Cast: 7 /LPF  Epithelial Cells: 1 /HPF  RADIOLOGY & ADDITIONAL STUDIES (independently reviewed unless otherwise noted):  MR Upper Ext Non-joint w/ IV Cont, Right (12.05.23 @ 19:02)   IMPRESSION: Limited exam.  Nonspecific multifocal muscular signal alteration, suggesting nonspecific   myositis.  Advanced rotator cuff arthropathy at the glenohumeral joint.                                      [facility]                                     Neurology at [place]                                 Doris Coker, & Dar                                  [address]                                        [place], [place], [address]                                             [phone]    CC: weakness  HPI: The patient is a 83y Male who presented with b/l proximal weakness for 3 weeks.  It started in the right arm and proceeded to effect left arm and then legs.  He is unable to raise arms to brush hair or arise from seated position without using hands to push up.  He says it started suddenly 3 weeks ago.  He also noted his urine being dark at roughly the same time.  He has no family history of weakness to suggest a muscular dystrophy and has no physical features of myotonic dystrophy.  he was at [facility] about  a week ago with CPK about 15,000 but apparently signed out AMA prior to evaluation  he returned yesterday because he was not getting better.  Neurology is asked to evaluate.    Interval history: no neuro events, remains with proximal weakness of 4 extremities, has neck soreness    Review of systems (neurology): Denies headache or dizziness. (+) weakness, no numbness.  Denies speech/language deficits. Denies diplopia/blurred vision.  Denies confusion    MEDICATIONS  (STANDING):  cyanocobalamin 1000 MICROGram(s) Oral daily  enoxaparin Injectable 40 milliGRAM(s) SubCutaneous every 24 hours  folic acid 1 milliGRAM(s) Oral daily  lisinopril 5 milliGRAM(s) Oral daily  pantoprazole    Tablet 40 milliGRAM(s) Oral before breakfast  predniSONE   Tablet 60 milliGRAM(s) Oral daily  sodium chloride 0.9%. 1000 milliLiter(s) (120 mL/Hr) IV Continuous <Continuous>    MEDICATIONS  (PRN):      Vital Signs Last 24 Hrs  T(C): 36.7 (06 Dec 2023 08:26), Max: 39.3 (06 Dec 2023 03:00)  T(F): 98.1 (06 Dec 2023 08:26), Max: 102.7 (06 Dec 2023 03:00)  HR: 65 (06 Dec 2023 08:26) (62 - 100)  BP: 149/64 (06 Dec 2023 08:26) (121/66 - 175/82)  BP(mean): --  RR: 18 (06 Dec 2023 08:26) (18 - 40)  SpO2: 95% (06 Dec 2023 08:26) (91% - 99%)    Parameters below as of 06 Dec 2023 08:26  Patient On (Oxygen Delivery Method): room air      Detailed Neurologic Exam:    Mental status: The patient is awake and alert and has normal attention span.  The patient is fully oriented in 3 spheres. The patient is oriented to current events. The patient is able to name objects, follow commands, repeat sentences.    Cranial nerves: Pupils equal and react symmetrically to light. There is no visual field deficit to confrontation. Extraocular motion is full with no nystagmus. There is no ptosis. Facial sensation is intact. Facial musculature is symmetric. Palate elevates symmetrically. Tongue is midline.    Motor: There is normal bulk and tone.  There is no tremor.  Strength is 1-2/5 in the right deltoid unable to abduct well  has 4+/5 distal power in arm. Has 2/5 IPS better (4+/5) in distal right leg.   Strength is 1-2/5 in the left deltoid unable to abduct well  has 4+/5 distal power in arm. Has 1-2/5 IPS better (4+/5) in distal left leg.     Sensation: Intact to light touch and pin in 4 extremities    Reflexes: 2+ patella and biceps and plantar responses are flexor.    Cerebellar: There is no dysmetria on finger to nose testing given limitations of shoulder weakness.    Gait : deferred    LABS:                                             10.6   17.31 )-----------( 373      ( 06 Dec 2023 06:30 )             31.7     12-06    137  |  105  |  16.2  ----------------------------<  125<H>  4.7   |  23.0  |  0.64    Ca    7.8<L>      06 Dec 2023 06:50  Phos  2.8     12-05  Mg     2.0     12-05    TPro  5.4<L>  /  Alb  2.7<L>  /  TBili  0.5  /  DBili  x   /  AST  424<H>  /  ALT  487<H>  /  AlkPhos  126<H>  12-06    LIVER FUNCTIONS - ( 06 Dec 2023 06:50 )  Alb: 2.7 g/dL / Pro: 5.4 g/dL / ALK PHOS: 126 U/L / ALT: 487 U/L / AST: 424 U/L / GGT: x           PT/INR - ( 06 Dec 2023 10:08 )   PT: 12.8 sec;   INR: 1.16 ratio         PTT - ( 06 Dec 2023 10:08 )  PTT:31.0 sec  MOSES-1 Antibody (12.02.23 @ 16:38)   MOSES-1 Antibody: <0.2: Fluorescent Bead Immunoassay   Sedimentation Rate, Erythrocyte (12.02.23 @ 16:38)   Sedimentation Rate, Erythrocyte: 53:   C-Reactive Protein, Serum (12.02.23 @ 16:38)   C-Reactive Protein, Serum: 8 mg/h  Creatine Kinase, Serum (12.06.23 @ 06:30)    Creatine Kinase, Serum: 6819 U/L  Creatine Kinase, Serum in AM (12.03.23 @ 03:33)   Creatine Kinase, Serum: 41505:  CKMB Units (12.03.23 @ 03:33)   CKMB Units: 169.6 ng/mLHepatic Function Panel in AM (12.03.23 @ 03:33)   Myoglobin (12.04.23 @ 03:06)   Myoglobin: 4900 ng/mL  Myoglobin (12.02.23 @ 16:38)   Myoglobin: 6780 ng/mL  Lactate Dehydrogenase, Serum in AM (12.04.23 @ 03:06)   Lactate Dehydrogenase, Serum: 912 U/L  Lactate Dehydrogenase, Serum (12.02.23 @ 16:38)   Lactate Dehydrogenase, Serum: 1208 U/L  Indirect Reacting Bilirubin: 0.3 mg/dL  Protein Total: 5.6 g/dL  Albumin: 2.9 g/dL  Bilirubin Total: 0.4 mg/dL  Bilirubin Direct: 0.1 mg/dL  Alkaline Phosphatase: 136 U/L  Aspartate Aminotransferase (AST/SGOT): 600 U/L  Alanine Aminotransferase (ALT/SGPT): 522 U/L  Gamma Glutamyl Transferase, Serum (12.02.23 @ 16:38)   Gamma Glutamyl Transferase, Serum: 29 U/L  Transferrin, Serum (12.02.23 @ 16:38)   Transferrin, Serum: 193 mg/dL  Ferritin (12.02.23 @ 16:38)   Rheumatoid Factor Quant, Serum or Plasma (12.02.23 @ 16:38)   Rheumatoid Factor Quant, Serum or Plasma: <10: Test Repeated IU/mLFerritin: 620 ng/mLVitamin  Iron with Total Binding Capacity (12.02.23 @ 16:38)   Iron - Total Binding Capacity.: 276 ug/dL  % Saturation, Iron: 13 %  Iron Total: 36 ug/dL   B12, Serum (12.02.23 @ 16:38)   Vitamin B12, Serum: 340 pg/mL  Rheumatoid Factor Quant, Serum or Plasma (12.02.23 @ 16:38)   Rheumatoid Factor Quant, Serum or Plasma: <10: Test Repeated IU/mL  Urinalysis + Microscopic Examination (12.02.23 @ 15:00)   pH Urine: 5.5  Urine Appearance: Turbid  Color: Dark Yellow  Specific Gravity: 1.027  Protein, Urine: 100 mg/dL  Glucose Qualitative, Urine: Negative mg/dL  Ketone - Urine: Negative mg/dL  Blood, Urine: Large  Bilirubin: Small  Urobilinogen: 1.0 mg/dL  Leukocyte Esterase Concentration: Small  Nitrite: Negative  White Blood Cell - Urine: 1 /HPF  Red Blood Cell - Urine: 272 /HPF  Bacteria: Negative /HPF  Cast: 7 /LPF  Epithelial Cells: 1 /HPF  RADIOLOGY & ADDITIONAL STUDIES (independently reviewed unless otherwise noted):  MR Upper Ext Non-joint w/ IV Cont, Right (12.05.23 @ 19:02)   IMPRESSION: Limited exam.  Nonspecific multifocal muscular signal alteration, suggesting nonspecific   myositis.  Advanced rotator cuff arthropathy at the glenohumeral joint.

## 2023-12-06 NOTE — CHART NOTE - NSCHARTNOTEFT_GEN_A_CORE
CODE SEPSIS 2HR FOLLOW UP NOTE:    Patient seen and examined at bedside as follow up of recently called CODE SEPSIS. Pt A&O X3 sleeping in bed, NAD. Pt without complaints.     T 101F rectal, P 93, RR 21, O2 sat 93% on room air.    LABS ----------                        8.6    11.95 )-----------( 296      ( 06 Dec 2023 03:27 )             24.6       12-06    99<LL>  |  72<L>  |  13.4  ----------------------------<  82  3.6   |  16.0<L>  |  0.30<L>    Ca    5.7<LL>      06 Dec 2023 03:27  Phos  2.8     12-05  Mg     2.0     12-05    TPro  4.2<L>  /  Alb  2.1<L>  /  TBili  0.3<L>  /  DBili  x   /  AST  330<H>  /  ALT  374<H>  /  AlkPhos  92  12-06      LIVER FUNCTIONS - ( 06 Dec 2023 03:27 )  Alb: 2.1 g/dL / Pro: 4.2 g/dL / ALK PHOS: 92 U/L / ALT: 374 U/L / AST: 330 U/L / GGT: x             Lactate, Blood: 2.2 mmol/L (12-06 @ 03:27)      ?accuracy of labs. Repeat CBC and CMP STAT. Will sign out to day team to F/U.  Temp trending down. Applied cool compress to B/L axilla and groin. Repeat temp 1 hr.   Repeat lactate in 4 hrs  Pending blood cultures results, U/A, U/C  Continue to monitor and reassess prn  PMD will be notified in AM.

## 2023-12-06 NOTE — PROGRESS NOTE ADULT - SUBJECTIVE AND OBJECTIVE BOX
Patient is a 83y old  Male who presents with a chief complaint of muscle weakness (04 Dec 2023 13:42)      Patient seen and examined at bedside in am   febrile overnight   sepsis work up ordered   labs reviewed   pt is also with new onset diarrhea   he denies any abdominal pain , no cough   arm weakness same         Vital Signs Last 24 Hrs  T(C): 36.7 (06 Dec 2023 08:26), Max: 39.3 (06 Dec 2023 03:00)  T(F): 98.1 (06 Dec 2023 08:26), Max: 102.7 (06 Dec 2023 03:00)  HR: 65 (06 Dec 2023 08:26) (62 - 100)  BP: 149/64 (06 Dec 2023 08:26) (121/66 - 175/82)  BP(mean): --  RR: 18 (06 Dec 2023 08:26) (18 - 40)  SpO2: 95% (06 Dec 2023 08:26) (91% - 99%)    Parameters below as of 06 Dec 2023 08:26  Patient On (Oxygen Delivery Method): room air        PHYSICAL EXAM:  General: awake alert   Lungs: CTA , no wheezing   Cardio: RRR, S1/S2, No murmur  Abdomen: Soft, Nontender, Nondistended; Bowel sounds present  Extremities: No calf tenderness, No pitting edema  Skin : warm . normal color   Neuro awake alert oriented , upper ext proximal weakness                                 10.6   17.31 )-----------( 373      ( 06 Dec 2023 06:30 )             31.7   12-06    137  |  105  |  16.2  ----------------------------<  125<H>  4.7   |  23.0  |  0.64    Ca    7.8<L>      06 Dec 2023 06:50  Phos  2.8     12-05  Mg     2.0     12-05    TPro  5.4<L>  /  Alb  2.7<L>  /  TBili  0.5  /  DBili  x   /  AST  424<H>  /  ALT  487<H>  /  AlkPhos  126<H>  12-06      c< from: MR Upper Ext Non-joint w/ IV Cont, Right (12.05.23 @ 19:02) >  IMPRESSION: Limited exam.  Nonspecific multifocal muscular signal alteration, suggesting nonspecific   myositis.  Advanced rotator cuff arthropathy at the glenohumeral joint.    --- End of Report ---      < end of copied text >  c< from: Xray Chest 2 Views PA/Lat (12.02.23 @ 12:27) >  IMPRESSION:    Unremarkable plain film examination of the chest    --- End of Report ---        < end of copied text >

## 2023-12-06 NOTE — PROGRESS NOTE ADULT - ASSESSMENT
The patient is a 83y Male who is followed by neurology because of weakness, proximal>distal for about 3 weeks, painless with elevated muscle enzymes (rhabdo) and myoglobinuria.  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

## 2023-12-06 NOTE — CHART NOTE - NSCHARTNOTEFT_GEN_A_CORE
Pt seen at bedside for Code Sepsis called at 03:00.   84 yo M with HTN , hyperlipidemia presented with myalgia and weakness. Found to have elevated CPK, admitted to R/O rhabdomyolysis.   Patient awake, alert and oriented X3. Admits to diarrhea X1 occurring immediately prior to code sepsis being called. Denies CP, SOB, cough, dizziness, lightheadedness, palpitations, N/V, abd pain or discomfort, chills.      T(F): 102.7 rectal  HR: 99-113bpm on bedside cardiac monitor  BP:  156/72  RR: 40  SpO2: 99%    PE   GENERAL: awake, alert, calm  ENMT: Oral mucosa with dry membranes.   CHEST/LUNG: CTA bilaterally. No wheezing, rhonchi, rales. Speaking in full sentences  HEART: +S1/S2, mildly tachycardic  ABDOMEN: Soft, Nontender, Nondistended. Bowel sounds present x4 quads  EXTREMITIES:  2+ Peripheral Pulses. No edema.  NERVOUS SYSTEM:  Alert & Oriented X3    STAT LABS ORDERED:   - cbc w/diff  - cmp  - blood cultures x2  - lactate  - procalcitonin  - PT/PTT/INR  - UA  - Urine Culture  - RVP  - Repeat CK  - GI PCR (unable to obtain STAT sample at this time as stool on floor in restroom)    IMAGING/DIAGNOSTIC STUDIES ORDERED: CXR    ANTIBIOTICS ORDERED: Vancomycin IVPB and Zosyn IVPB STAT X1 dose    FLUIDS GIVEN: 30mL/kg pending lactate result.      PLAN OF CARE/ INTERVENTIONS PLANNED:   - Ofirmev 1gram IVPB X1 dose for fever. Repeat temp 1hr.   - abx + fluids as above  - VS q30min x 1hr, then q1hr x 6hrs  - FU labs + imaging  - 2hr bedside follow up planned    DISPOSITION:  - Telemetry bed    Dr. Bullard made aware Pt seen at bedside for Code Sepsis called at 03:00.   84 yo M with HTN , hyperlipidemia presented with myalgia and weakness. Found to have elevated CPK, admitted to R/O rhabdomyolysis.   Patient awake, alert and oriented X3. Admits to diarrhea X1 occurring immediately prior to code sepsis being called. Denies CP, SOB, cough, dizziness, lightheadedness, palpitations, N/V, abd pain or discomfort, chills.      T(F): 102.7 rectal  HR: 99-113bpm on bedside cardiac monitor  BP:  156/72  RR: 40  SpO2: 99%    PE   GENERAL: awake, alert, calm  ENMT: Oral mucosa with dry membranes.   CHEST/LUNG: CTA bilaterally. No wheezing, rhonchi, rales. Speaking in full sentences  HEART: +S1/S2, mildly tachycardic  ABDOMEN: Soft, Nontender, Nondistended. Bowel sounds present x4 quads  EXTREMITIES:  2+ Peripheral Pulses. No edema.  NERVOUS SYSTEM:  Alert & Oriented X3    STAT LABS ORDERED:   - cbc w/diff  - cmp  - blood cultures x2  - lactate  - procalcitonin  - PT/PTT/INR  - UA  - Urine Culture  - RVP  - Repeat CK  - GI PCR (unable to obtain STAT sample at this time as stool on floor in restroom)    IMAGING/DIAGNOSTIC STUDIES ORDERED: CXR STAT EKG: HR 103bpm, sinus tach, no acute ischemic changes.    ANTIBIOTICS ORDERED: Vancomycin IVPB and Zosyn IVPB STAT X1 dose    FLUIDS GIVEN: 30mL/kg pending lactate result.      PLAN OF CARE/ INTERVENTIONS PLANNED:   - Ofirmev 1gram IVPB X1 dose for fever. Repeat temp 1hr.   - abx + fluids as above  - VS q30min x 1hr, then q1hr x 6hrs  - FU labs + imaging  - 2hr bedside follow up planned    DISPOSITION:  - Telemetry bed    Dr. Bullard made aware Pt seen at bedside for Code Sepsis called at 03:00.   82 yo M with HTN , hyperlipidemia presented with myalgia and weakness. Found to have elevated CPK, admitted to R/O rhabdomyolysis.   Patient awake, alert and oriented X3. Admits to diarrhea X1 occurring immediately prior to code sepsis being called. Denies CP, SOB, cough, dizziness, lightheadedness, palpitations, N/V, abd pain or discomfort, chills.      T(F): 102.7 rectal  HR: 99-113bpm on bedside cardiac monitor  BP:  156/72  RR: 40  SpO2: 99%    PE   GENERAL: awake, alert, calm  ENMT: Oral mucosa with dry membranes.   CHEST/LUNG: CTA bilaterally. No wheezing, rhonchi, rales. Speaking in full sentences  HEART: +S1/S2, mildly tachycardic  ABDOMEN: Soft, Nontender, Nondistended. Bowel sounds present x4 quads  EXTREMITIES:  2+ Peripheral Pulses. No edema.  NERVOUS SYSTEM:  Alert & Oriented X3    STAT LABS ORDERED:   - cbc w/diff  - cmp  - blood cultures x2  - lactate  - procalcitonin  - PT/PTT/INR  - UA  - Urine Culture  - RVP  - Repeat CK  - GI PCR (unable to obtain STAT sample at this time as stool on floor in restroom)    IMAGING/DIAGNOSTIC STUDIES ORDERED:   CXR: ?patchy opacities. Called radiologist verbal report: atelectasis, no pneumothorax, no pleural effusion, no active pulmonary disease. F/U final report.  STAT EKG: HR 103bpm, sinus tach, no acute ischemic changes.    ANTIBIOTICS ORDERED: Vancomycin IVPB and Zosyn IVPB STAT X1 dose    FLUIDS GIVEN: 30mL/kg pending lactate result.      PLAN OF CARE/ INTERVENTIONS PLANNED:   - Ofirmev 1gram IVPB X1 dose for fever. Repeat temp 1hr.   - abx + fluids as above  - VS q30min x 1hr, then q1hr x 6hrs  - FU labs + imaging  - 2hr bedside follow up planned    DISPOSITION:  - Telemetry bed    Dr. Bullard made aware Pt seen at bedside for Code Sepsis called at 03:00.   84 yo M with HTN , hyperlipidemia presented with myalgia and weakness. Found to have elevated CPK, admitted to R/O rhabdomyolysis.   Patient awake, alert and oriented X3. Admits to diarrhea X1 occurring immediately prior to code sepsis being called. Denies CP, SOB, cough, dizziness, lightheadedness, palpitations, N/V, abd pain or discomfort, chills.      T(F): 102.7 rectal  HR: 99-113bpm on bedside cardiac monitor  BP:  156/72  RR: 40  SpO2: 99%    PE   GENERAL: awake, alert, calm  ENMT: Oral mucosa with dry membranes.   CHEST/LUNG: CTA bilaterally. No wheezing, rhonchi, rales. Speaking in full sentences  HEART: +S1/S2, mildly tachycardic  ABDOMEN: Soft, Nontender, Nondistended. Bowel sounds present x4 quads  EXTREMITIES:  2+ Peripheral Pulses. No edema.  NERVOUS SYSTEM:  Alert & Oriented X3    STAT LABS ORDERED:   - cbc w/diff  - cmp  - blood cultures x2  - lactate  - procalcitonin  - PT/PTT/INR  - UA  - Urine Culture  - RVP  - Repeat CK  - GI PCR (unable to obtain STAT sample at this time as stool on floor in restroom)    IMAGING/DIAGNOSTIC STUDIES ORDERED:   CXR: ?patchy opacities. Called radiologist verbal report: atelectasis, poor inspiratory effort, no pneumothorax, no pleural effusion, no active pulmonary disease. F/U final report.  STAT EKG: HR 103bpm, sinus tach, no acute ischemic changes.    ANTIBIOTICS ORDERED: Vancomycin IVPB and Zosyn IVPB STAT X1 dose    FLUIDS GIVEN: 30mL/kg pending lactate result.      PLAN OF CARE/ INTERVENTIONS PLANNED:   - Ofirmev 1gram IVPB X1 dose for fever. Repeat temp 1hr.   - abx + fluids as above  - VS q30min x 1hr, then q1hr x 6hrs  - FU labs + imaging  - 2hr bedside follow up planned    DISPOSITION:  - Telemetry bed    Dr. Bullard made aware Pt seen at bedside for Code Sepsis called at 03:00.   82 yo M with HTN , hyperlipidemia presented with myalgia and weakness. Found to have elevated CPK, admitted to R/O rhabdomyolysis.   Patient awake, alert and oriented X3. Admits to diarrhea X1 occurring immediately prior to code sepsis being called. Denies CP, SOB, cough, dizziness, lightheadedness, palpitations, N/V, dysuria, burning upon urination, abd pain or discomfort, chills.      T(F): 102.7 rectal  HR: 99-113bpm on bedside cardiac monitor  BP:  156/72  RR: 40  SpO2: 99%    PE   GENERAL: awake, alert, calm  ENMT: Oral mucosa with dry membranes.   CHEST/LUNG: CTA bilaterally. No wheezing, rhonchi, rales. Speaking in full sentences  HEART: +S1/S2, mildly tachycardic  ABDOMEN: Soft, Nontender, Nondistended. Bowel sounds present x4 quads  EXTREMITIES:  2+ Peripheral Pulses. No edema.  NERVOUS SYSTEM:  Alert & Oriented X3    STAT LABS ORDERED:   - cbc w/diff  - cmp  - blood cultures x2  - lactate  - procalcitonin  - PT/PTT/INR  - UA  - Urine Culture  - RVP  - Repeat CK  - GI PCR (unable to obtain STAT sample at this time as stool on floor in restroom)    IMAGING/DIAGNOSTIC STUDIES ORDERED:   CXR: ?patchy opacities. Called radiologist verbal report: atelectasis, poor inspiratory effort, no pneumothorax, no pleural effusion, no active pulmonary disease. F/U final report.  STAT EKG: HR 103bpm, sinus tach, no acute ischemic changes.    ANTIBIOTICS ORDERED: Vancomycin IVPB and Zosyn IVPB STAT X1 dose    FLUIDS GIVEN: 30mL/kg pending lactate result.      PLAN OF CARE/ INTERVENTIONS PLANNED:   - Ofirmev 1gram IVPB X1 dose for fever. Repeat temp 1hr.   - abx + fluids as above  - VS q30min x 1hr, then q1hr x 6hrs  - FU labs + imaging  - 2hr bedside follow up planned    DISPOSITION:  - Telemetry bed    Dr. Bullard made aware

## 2023-12-06 NOTE — PROGRESS NOTE ADULT - ASSESSMENT
82 yo M with HTN , hyperlipidemia presented with shoulder arm pain , weakness of arms and lower leg high CPK       1- muscle weakness, myopathy/ myositis   MR of arm showed monspecific myositis   will d/w rheumotology . ? muscle bx   pt is with fever   work up blood cx are pending   + diarrhea will get Ct of chest / abd pelvis fever work up r/o colitis   cont po prednisone   serology work up is P   cont  na bicarbonate iv infusion   CPK is trending \    2- Rhabdomyolysis   cont IVF with bicarbonate   nephrology follow up , cr is stable   CPK and myoglobulin trending down   cont to hold lipitor       3- Hypokalemia , recurrent   replaced     4-HTN   cont lisinopril 5 mg  daily   cr is stable     DVT prophylaxis

## 2023-12-07 ENCOUNTER — APPOINTMENT (OUTPATIENT)
Dept: INTERNAL MEDICINE | Facility: CLINIC | Age: 83
End: 2023-12-07

## 2023-12-07 PROBLEM — I10 ESSENTIAL (PRIMARY) HYPERTENSION: Chronic | Status: ACTIVE | Noted: 2023-12-02

## 2023-12-07 LAB
CULTURE RESULTS: SIGNIFICANT CHANGE UP
GI PCR PANEL: DETECTED
GI PCR PANEL: DETECTED
NOROVIRUS GI+II RNA STL QL NAA+NON-PROBE: DETECTED
NOROVIRUS GI+II RNA STL QL NAA+NON-PROBE: DETECTED
SPECIMEN SOURCE: SIGNIFICANT CHANGE UP

## 2023-12-07 PROCEDURE — 99232 SBSQ HOSP IP/OBS MODERATE 35: CPT

## 2023-12-07 RX ADMIN — LISINOPRIL 5 MILLIGRAM(S): 2.5 TABLET ORAL at 06:13

## 2023-12-07 RX ADMIN — SODIUM CHLORIDE 120 MILLILITER(S): 9 INJECTION INTRAMUSCULAR; INTRAVENOUS; SUBCUTANEOUS at 22:26

## 2023-12-07 RX ADMIN — Medication 60 MILLIGRAM(S): at 06:13

## 2023-12-07 RX ADMIN — PREGABALIN 1000 MICROGRAM(S): 225 CAPSULE ORAL at 13:23

## 2023-12-07 RX ADMIN — Medication 1 MILLIGRAM(S): at 13:22

## 2023-12-07 RX ADMIN — ENOXAPARIN SODIUM 40 MILLIGRAM(S): 100 INJECTION SUBCUTANEOUS at 06:14

## 2023-12-07 RX ADMIN — PANTOPRAZOLE SODIUM 40 MILLIGRAM(S): 20 TABLET, DELAYED RELEASE ORAL at 06:13

## 2023-12-07 NOTE — CONSULT NOTE ADULT - SUBJECTIVE AND OBJECTIVE BOX
SUBJECTIVE/HPI: 84yo M w/ hx of hypertension and hyperlipidemia presenting with 2 weeks of progressive upper extremity and lower extremity weakness. Pt states that he noticed 2 weeks ago that he was unable to lift his bilateral upper extremities. Weakness in R worse than L. He also had several falls 2/2 lower extremity weakness. He presented to the ED on 12/2 and was admitted for further workup. He was seen by neurology and rheumatology and myositis serologies were sent. Was started on steroids. 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 Surgery consulted for muscle biopsy for confirmation of diagnosis. Of note, pt was a code sepsis on 12/6 for T 102.7, , RR 40. CT chest findings concerning for developing PNA. At this time, pt denies any fevers, chills, or respiratory distress.     Vitals:  T(C): 36.4 (12-07-23 @ 08:30), Max: 36.9 (12-06-23 @ 20:44)  T(F): 97.5 (12-07-23 @ 08:30), Max: 98.4 (12-06-23 @ 20:44)  HR: 68 (12-07-23 @ 08:30) (62 - 74)  BP: 140/72 (12-07-23 @ 08:30) (127/58 - 149/74)  ABP: --  ABP(mean): --  RR: 18 (12-07-23 @ 08:30) (16 - 18)  SpO2: 96% (12-07-23 @ 08:30) (93% - 97%)      LABS:  12-06 @ 06:30 Creatine 6819 U/L<H> [30 - 200]  12-06 @ 03:27 Creatine 5099 U/L<H> [30 - 200]  cret                        10.6   17.31 )-----------( 373      ( 06 Dec 2023 06:30 )             31.7     12-06    137  |  105  |  16.2  ----------------------------<  125<H>  4.7   |  23.0  |  0.64    Ca    7.8<L>      06 Dec 2023 06:50    TPro  5.4<L>  /  Alb  2.7<L>  /  TBili  0.5  /  DBili  x   /  AST  424<H>  /  ALT  487<H>  /  AlkPhos  126<H>  12-06    PT/INR - ( 06 Dec 2023 10:08 )   PT: 12.8 sec;   INR: 1.16 ratio         PTT - ( 06 Dec 2023 10:08 )  PTT:31.0 sec    PHYSICAL EXAM:  General: awake alert   Neck: supple . no JVD   Lungs: CTA bilateral   Cardio: RRR, S1/S2, No murmur  Abdomen: Soft, Nontender, Nondistended; Bowel sounds present  Extremities: No calf tenderness, No pitting edema  Musculoskletal : arm weakness

## 2023-12-07 NOTE — PROGRESS NOTE ADULT - SUBJECTIVE AND OBJECTIVE BOX
Patient is a 83y old  Male who presents with a chief complaint of muscle weakness (04 Dec 2023 13:42)      Patient seen and examined at bedside this moring   afebrile   no diarrhea   febrile overnight     surgery consulted re muscle biopsy     meds reviewed         Vital Signs Last 24 Hrs  T(C): 36.4 (07 Dec 2023 08:30), Max: 36.9 (06 Dec 2023 20:44)  T(F): 97.5 (07 Dec 2023 08:30), Max: 98.4 (06 Dec 2023 20:44)  HR: 68 (07 Dec 2023 08:30) (62 - 74)  BP: 140/72 (07 Dec 2023 08:30) (127/58 - 149/74)  BP(mean): 81 (06 Dec 2023 16:22) (81 - 81)  RR: 18 (07 Dec 2023 08:30) (16 - 18)  SpO2: 96% (07 Dec 2023 08:30) (93% - 97%)    Parameters below as of 07 Dec 2023 08:30  Patient On (Oxygen Delivery Method): room air        PHYSICAL EXAM:  General: awake alert   Lungs: CTA , no wheezing   Cardio: RRR, S1/S2, No murmur  Abdomen: Soft, Nontender, Nondistended; Bowel sounds present  Extremities: No calf tenderness, No pitting edema  Skin : warm . normal color   Neuro awake alert oriented , upper ext proximal weakness             gNorovirus GI/GII: Detected (12.06.23 @ 12:42)                           10.6   17.31 )-----------( 373      ( 06 Dec 2023 06:30 )             31.7   12-06    137  |  105  |  16.2  ----------------------------<  125<H>  4.7   |  23.0  |  0.64    Ca    7.8<L>      06 Dec 2023 06:50    TPro  5.4<L>  /  Alb  2.7<L>  /  TBili  0.5  /  DBili  x   /  AST  424<H>  /  ALT  487<H>  /  AlkPhos  126<H>  12-06

## 2023-12-07 NOTE — PROGRESS NOTE ADULT - ASSESSMENT
83y Male who is followed by neurology because of weakness, proximal>distal for about 3 weeks, painless with elevated muscle enzymes (rhabdo) and myoglobinuria.  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  Rheumatology has been consulted and made recommendations for blood work, MRI and possible muscle biopsy.  Agree with steroids for now pending results.   Myoglobin and LDH trending down with steroids  Follow serial CPKs  Given overall picture this does not appear to be consistent with stroke or spine disease (cord compression or myelopathy).  PT/OT eval/treat.  Watch renal function given rhabdomyolysis, may need higher bicarb solution.    MRI RUE shows non specific myositis- will need muscle biopsy for diagnosis.    Case discussed with Dr Moeller.

## 2023-12-07 NOTE — PROGRESS NOTE ADULT - ASSESSMENT
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

## 2023-12-07 NOTE — CONSULT NOTE ADULT - ASSESSMENT
ASSESSMENT: 84yo M w/ hx of HTN and HL presents with progressive muscle weakness and labs/imaging findings concerning for polymyositis. Given recent code sepsis, WBC 17, and concern for developing PNA, will hold off on OR scheduling until pt is optimized from an infection standpoint.     PLAN:  - will trend fever curve, WBC  - f/u blood cultures  - monitor infectious symptoms  - will plan for muscle biopsy once pt is optimized  - general surgery will follow    Pt seen and plan discussed with attending, Dr. Vigil ASSESSMENT: 82yo M w/ hx of HTN and HL presents with progressive muscle weakness and labs/imaging findings concerning for polymyositis. Given recent code sepsis, WBC 17, and concern for developing PNA, will hold off on OR scheduling until pt is optimized from an infection standpoint.     PLAN:  - will trend fever curve, WBC  - f/u blood cultures  - monitor infectious symptoms  - will plan for muscle biopsy once pt is optimized  - general surgery will follow    Pt seen and plan discussed with attending, Dr. Vigil

## 2023-12-07 NOTE — PROGRESS NOTE ADULT - SUBJECTIVE AND OBJECTIVE BOX
[facility]                                        Neurology at [place]                                 [facility]                                  [address]                                        [place], [place], [address]                                             [phone]        CC: Myopathy.    HPI:   The patient is a 83y Male who presented with b/l proximal weakness for 3 weeks.  It started in the right arm and proceeded to effect left arm and then legs.  He is unable to raise arms to brush hair or arise from seated position without using hands to push up.  He says it started suddenly 3 weeks ago.  He also noted his urine being dark at roughly the same time.  He has no family history of weakness to suggest a muscular dystrophy and has no physical features of myotonic dystrophy.  he was at [facility] about  a week ago with CPK about 15,000 but apparently signed out AMA prior to evaluation  he returned yesterday because he was not getting better.  Neurology is asked to evaluate. (AR).    Interim history:  On 4 Tower.     ROS:   Denies headache or dizziness.  Denies chest pain.  Denies shortness of breath.    MEDICATIONS  (STANDING):  cyanocobalamin 1000 MICROGram(s) Oral daily  enoxaparin Injectable 40 milliGRAM(s) SubCutaneous every 24 hours  folic acid 1 milliGRAM(s) Oral daily  lisinopril 5 milliGRAM(s) Oral daily  pantoprazole    Tablet 40 milliGRAM(s) Oral before breakfast  predniSONE   Tablet 60 milliGRAM(s) Oral daily  sodium chloride 0.9%. 1000 milliLiter(s) (120 mL/Hr) IV Continuous <Continuous>    Vital Signs Last 24 Hrs  T(C): 36.4 (07 Dec 2023 08:30), Max: 36.9 (06 Dec 2023 20:44)  T(F): 97.5 (07 Dec 2023 08:30), Max: 98.4 (06 Dec 2023 20:44)  HR: 68 (07 Dec 2023 08:30) (62 - 74)  BP: 140/72 (07 Dec 2023 08:30) (127/58 - 149/74)  BP(mean): 81 (06 Dec 2023 16:22) (81 - 81)  RR: 18 (07 Dec 2023 08:30) (16 - 18)  SpO2: 96% (07 Dec 2023 08:30) (93% - 97%)    Parameters below as of 07 Dec 2023 08:30  Patient On (Oxygen Delivery Method): room air    Detailed Neurologic Exam:    Mental status: The patient is awake and alert. There is no aphasia. There is no dysarthria.     Cranial nerves: Pupils equal and react symmetrically to light. There is no visual field deficit to confrontation. Extraocular motion is full with no nystagmus. There is no ptosis. Facial sensation is intact. Facial musculature is symmetric. Palate elevates symmetrically. Tongue is midline.    Motor: There is normal bulk and tone.  There is no tremor.  Strength is 1-2/5 in the right deltoid unable to abduct well  has 4+ - 5/5 distal power in arm. Has 2/5 IPS better (4+/5) in distal right leg.   Strength is 1-2/5 in the left deltoid unable to abduct well  has 4+ - 5/5 distal power in arm. Has 1-2/5 IPS better (4+/5) in distal left leg.     Sensation: Intact to light touch and pin in 4 extremities    Reflexes: 2+ patella and biceps and plantar responses are flexor.    Cerebellar: There is no dysmetria on finger to nose testing given limitations of shoulder weakness.    Labs:     12-06    137  |  105  |  16.2  ----------------------------<  125<H>  4.7   |  23.0  |  0.64    Ca    7.8<L>      06 Dec 2023 06:50    TPro  5.4<L>  /  Alb  2.7<L>  /  TBili  0.5  /  DBili  x   /  AST  424<H>  /  ALT  487<H>  /  AlkPhos  126<H>  12-06                            10.6   17.31 )-----------( 373      ( 06 Dec 2023 06:30 )             31.7     CK: 6819      Rad:   MR Upper Ext Non-joint w/ IV Cont, Right (12.05.23 @ 19:02)   IMPRESSION: Limited exam.  Nonspecific multifocal muscular signal alteration, suggesting nonspecific   myositis.  Advanced rotator cuff arthropathy at the glenohumeral joint.                                   [facility]                                        Neurology at [place]                                 [facility]                                  [address]                                        [place], [place], [address]                                             [phone]        CC: Myopathy.    HPI:   The patient is a 83y Male who presented with b/l proximal weakness for 3 weeks.  It started in the right arm and proceeded to effect left arm and then legs.  He is unable to raise arms to brush hair or arise from seated position without using hands to push up.  He says it started suddenly 3 weeks ago.  He also noted his urine being dark at roughly the same time.  He has no family history of weakness to suggest a muscular dystrophy and has no physical features of myotonic dystrophy.  he was at [facility] about  a week ago with CPK about 15,000 but apparently signed out AMA prior to evaluation  he returned yesterday because he was not getting better.  Neurology is asked to evaluate. (AR).    Interim history:  On 4 Tower.     ROS:   Denies headache or dizziness.  Denies chest pain.  Denies shortness of breath.    MEDICATIONS  (STANDING):  cyanocobalamin 1000 MICROGram(s) Oral daily  enoxaparin Injectable 40 milliGRAM(s) SubCutaneous every 24 hours  folic acid 1 milliGRAM(s) Oral daily  lisinopril 5 milliGRAM(s) Oral daily  pantoprazole    Tablet 40 milliGRAM(s) Oral before breakfast  predniSONE   Tablet 60 milliGRAM(s) Oral daily  sodium chloride 0.9%. 1000 milliLiter(s) (120 mL/Hr) IV Continuous <Continuous>    Vital Signs Last 24 Hrs  T(C): 36.4 (07 Dec 2023 08:30), Max: 36.9 (06 Dec 2023 20:44)  T(F): 97.5 (07 Dec 2023 08:30), Max: 98.4 (06 Dec 2023 20:44)  HR: 68 (07 Dec 2023 08:30) (62 - 74)  BP: 140/72 (07 Dec 2023 08:30) (127/58 - 149/74)  BP(mean): 81 (06 Dec 2023 16:22) (81 - 81)  RR: 18 (07 Dec 2023 08:30) (16 - 18)  SpO2: 96% (07 Dec 2023 08:30) (93% - 97%)    Parameters below as of 07 Dec 2023 08:30  Patient On (Oxygen Delivery Method): room air    Detailed Neurologic Exam:    Mental status: The patient is awake and alert. There is no aphasia. There is no dysarthria.     Cranial nerves: Pupils equal and react symmetrically to light. There is no visual field deficit to confrontation. Extraocular motion is full with no nystagmus. There is no ptosis. Facial sensation is intact. Facial musculature is symmetric. Palate elevates symmetrically. Tongue is midline.    Motor: There is normal bulk and tone.  There is no tremor.  Strength is 1-2/5 in the right deltoid unable to abduct well  has 4+ - 5/5 distal power in arm. Has 2/5 IPS better (4+/5) in distal right leg.   Strength is 1-2/5 in the left deltoid unable to abduct well  has 4+ - 5/5 distal power in arm. Has 1-2/5 IPS better (4+/5) in distal left leg.     Sensation: Intact to light touch and pin in 4 extremities    Reflexes: 2+ patella and biceps and plantar responses are flexor.    Cerebellar: There is no dysmetria on finger to nose testing given limitations of shoulder weakness.    Labs:     12-06    137  |  105  |  16.2  ----------------------------<  125<H>  4.7   |  23.0  |  0.64    Ca    7.8<L>      06 Dec 2023 06:50    TPro  5.4<L>  /  Alb  2.7<L>  /  TBili  0.5  /  DBili  x   /  AST  424<H>  /  ALT  487<H>  /  AlkPhos  126<H>  12-06                            10.6   17.31 )-----------( 373      ( 06 Dec 2023 06:30 )             31.7     CK: 6819      Rad:   MR Upper Ext Non-joint w/ IV Cont, Right (12.05.23 @ 19:02)   IMPRESSION: Limited exam.  Nonspecific multifocal muscular signal alteration, suggesting nonspecific   myositis.  Advanced rotator cuff arthropathy at the glenohumeral joint.

## 2023-12-07 NOTE — PROVIDER CONTACT NOTE (CRITICAL VALUE NOTIFICATION) - PERSON GIVING RESULT:
[facility] Wendi - JUSTICE Williamson [facility] Wendi - JUSTICE Williamson

## 2023-12-08 LAB
ALBUMIN SERPL ELPH-MCNC: 2.6 G/DL — LOW (ref 3.3–5.2)
ALBUMIN SERPL ELPH-MCNC: 2.6 G/DL — LOW (ref 3.3–5.2)
ALP SERPL-CCNC: 108 U/L — SIGNIFICANT CHANGE UP (ref 40–120)
ALP SERPL-CCNC: 108 U/L — SIGNIFICANT CHANGE UP (ref 40–120)
ALT FLD-CCNC: 482 U/L — HIGH
ALT FLD-CCNC: 482 U/L — HIGH
ANION GAP SERPL CALC-SCNC: 7 MMOL/L — SIGNIFICANT CHANGE UP (ref 5–17)
ANION GAP SERPL CALC-SCNC: 7 MMOL/L — SIGNIFICANT CHANGE UP (ref 5–17)
AST SERPL-CCNC: 420 U/L — HIGH
AST SERPL-CCNC: 420 U/L — HIGH
BASOPHILS # BLD AUTO: 0.07 K/UL — SIGNIFICANT CHANGE UP (ref 0–0.2)
BASOPHILS # BLD AUTO: 0.07 K/UL — SIGNIFICANT CHANGE UP (ref 0–0.2)
BASOPHILS NFR BLD AUTO: 0.5 % — SIGNIFICANT CHANGE UP (ref 0–2)
BASOPHILS NFR BLD AUTO: 0.5 % — SIGNIFICANT CHANGE UP (ref 0–2)
BILIRUB SERPL-MCNC: 0.4 MG/DL — SIGNIFICANT CHANGE UP (ref 0.4–2)
BILIRUB SERPL-MCNC: 0.4 MG/DL — SIGNIFICANT CHANGE UP (ref 0.4–2)
BUN SERPL-MCNC: 15.3 MG/DL — SIGNIFICANT CHANGE UP (ref 8–20)
BUN SERPL-MCNC: 15.3 MG/DL — SIGNIFICANT CHANGE UP (ref 8–20)
CALCIUM SERPL-MCNC: 7.6 MG/DL — LOW (ref 8.4–10.5)
CALCIUM SERPL-MCNC: 7.6 MG/DL — LOW (ref 8.4–10.5)
CHLORIDE SERPL-SCNC: 109 MMOL/L — HIGH (ref 96–108)
CHLORIDE SERPL-SCNC: 109 MMOL/L — HIGH (ref 96–108)
CK MB CFR SERPL CALC: 153.5 NG/ML — HIGH (ref 0–6.7)
CK MB CFR SERPL CALC: 153.5 NG/ML — HIGH (ref 0–6.7)
CK SERPL-CCNC: 6619 U/L — HIGH (ref 30–200)
CK SERPL-CCNC: 6619 U/L — HIGH (ref 30–200)
CO2 SERPL-SCNC: 25 MMOL/L — SIGNIFICANT CHANGE UP (ref 22–29)
CO2 SERPL-SCNC: 25 MMOL/L — SIGNIFICANT CHANGE UP (ref 22–29)
CREAT SERPL-MCNC: 0.47 MG/DL — LOW (ref 0.5–1.3)
CREAT SERPL-MCNC: 0.47 MG/DL — LOW (ref 0.5–1.3)
EGFR: 104 ML/MIN/1.73M2 — SIGNIFICANT CHANGE UP
EGFR: 104 ML/MIN/1.73M2 — SIGNIFICANT CHANGE UP
EOSINOPHIL # BLD AUTO: 0.35 K/UL — SIGNIFICANT CHANGE UP (ref 0–0.5)
EOSINOPHIL # BLD AUTO: 0.35 K/UL — SIGNIFICANT CHANGE UP (ref 0–0.5)
EOSINOPHIL NFR BLD AUTO: 2.3 % — SIGNIFICANT CHANGE UP (ref 0–6)
EOSINOPHIL NFR BLD AUTO: 2.3 % — SIGNIFICANT CHANGE UP (ref 0–6)
ERYTHROCYTE [SEDIMENTATION RATE] IN BLOOD: 23 MM/HR — HIGH (ref 0–15)
ERYTHROCYTE [SEDIMENTATION RATE] IN BLOOD: 23 MM/HR — HIGH (ref 0–15)
GLUCOSE SERPL-MCNC: 91 MG/DL — SIGNIFICANT CHANGE UP (ref 70–99)
GLUCOSE SERPL-MCNC: 91 MG/DL — SIGNIFICANT CHANGE UP (ref 70–99)
HCT VFR BLD CALC: 31.9 % — LOW (ref 39–50)
HCT VFR BLD CALC: 31.9 % — LOW (ref 39–50)
HGB BLD-MCNC: 11 G/DL — LOW (ref 13–17)
HGB BLD-MCNC: 11 G/DL — LOW (ref 13–17)
IMM GRANULOCYTES NFR BLD AUTO: 1.1 % — HIGH (ref 0–0.9)
IMM GRANULOCYTES NFR BLD AUTO: 1.1 % — HIGH (ref 0–0.9)
LDH SERPL L TO P-CCNC: 802 U/L — HIGH (ref 98–192)
LDH SERPL L TO P-CCNC: 802 U/L — HIGH (ref 98–192)
LYMPHOCYTES # BLD AUTO: 16.2 % — SIGNIFICANT CHANGE UP (ref 13–44)
LYMPHOCYTES # BLD AUTO: 16.2 % — SIGNIFICANT CHANGE UP (ref 13–44)
LYMPHOCYTES # BLD AUTO: 2.49 K/UL — SIGNIFICANT CHANGE UP (ref 1–3.3)
LYMPHOCYTES # BLD AUTO: 2.49 K/UL — SIGNIFICANT CHANGE UP (ref 1–3.3)
MCHC RBC-ENTMCNC: 32.2 PG — SIGNIFICANT CHANGE UP (ref 27–34)
MCHC RBC-ENTMCNC: 32.2 PG — SIGNIFICANT CHANGE UP (ref 27–34)
MCHC RBC-ENTMCNC: 34.5 GM/DL — SIGNIFICANT CHANGE UP (ref 32–36)
MCHC RBC-ENTMCNC: 34.5 GM/DL — SIGNIFICANT CHANGE UP (ref 32–36)
MCV RBC AUTO: 93.3 FL — SIGNIFICANT CHANGE UP (ref 80–100)
MCV RBC AUTO: 93.3 FL — SIGNIFICANT CHANGE UP (ref 80–100)
MONOCYTES # BLD AUTO: 1.23 K/UL — HIGH (ref 0–0.9)
MONOCYTES # BLD AUTO: 1.23 K/UL — HIGH (ref 0–0.9)
MONOCYTES NFR BLD AUTO: 8 % — SIGNIFICANT CHANGE UP (ref 2–14)
MONOCYTES NFR BLD AUTO: 8 % — SIGNIFICANT CHANGE UP (ref 2–14)
MYOGLOBIN SERPL-MCNC: 4835 NG/ML — HIGH (ref 28–72)
MYOGLOBIN SERPL-MCNC: 4835 NG/ML — HIGH (ref 28–72)
NEUTROPHILS # BLD AUTO: 11.03 K/UL — HIGH (ref 1.8–7.4)
NEUTROPHILS # BLD AUTO: 11.03 K/UL — HIGH (ref 1.8–7.4)
NEUTROPHILS NFR BLD AUTO: 71.9 % — SIGNIFICANT CHANGE UP (ref 43–77)
NEUTROPHILS NFR BLD AUTO: 71.9 % — SIGNIFICANT CHANGE UP (ref 43–77)
PLATELET # BLD AUTO: 379 K/UL — SIGNIFICANT CHANGE UP (ref 150–400)
PLATELET # BLD AUTO: 379 K/UL — SIGNIFICANT CHANGE UP (ref 150–400)
POTASSIUM SERPL-MCNC: 3.4 MMOL/L — LOW (ref 3.5–5.3)
POTASSIUM SERPL-MCNC: 3.4 MMOL/L — LOW (ref 3.5–5.3)
POTASSIUM SERPL-SCNC: 3.4 MMOL/L — LOW (ref 3.5–5.3)
POTASSIUM SERPL-SCNC: 3.4 MMOL/L — LOW (ref 3.5–5.3)
PROT SERPL-MCNC: 5.1 G/DL — LOW (ref 6.6–8.7)
PROT SERPL-MCNC: 5.1 G/DL — LOW (ref 6.6–8.7)
RBC # BLD: 3.42 M/UL — LOW (ref 4.2–5.8)
RBC # BLD: 3.42 M/UL — LOW (ref 4.2–5.8)
RBC # FLD: 14.4 % — SIGNIFICANT CHANGE UP (ref 10.3–14.5)
RBC # FLD: 14.4 % — SIGNIFICANT CHANGE UP (ref 10.3–14.5)
SODIUM SERPL-SCNC: 141 MMOL/L — SIGNIFICANT CHANGE UP (ref 135–145)
SODIUM SERPL-SCNC: 141 MMOL/L — SIGNIFICANT CHANGE UP (ref 135–145)
WBC # BLD: 15.34 K/UL — HIGH (ref 3.8–10.5)
WBC # BLD: 15.34 K/UL — HIGH (ref 3.8–10.5)
WBC # FLD AUTO: 15.34 K/UL — HIGH (ref 3.8–10.5)
WBC # FLD AUTO: 15.34 K/UL — HIGH (ref 3.8–10.5)

## 2023-12-08 PROCEDURE — 99232 SBSQ HOSP IP/OBS MODERATE 35: CPT

## 2023-12-08 RX ORDER — POTASSIUM CHLORIDE 20 MEQ
40 PACKET (EA) ORAL EVERY 4 HOURS
Refills: 0 | Status: COMPLETED | OUTPATIENT
Start: 2023-12-08 | End: 2023-12-08

## 2023-12-08 RX ORDER — SODIUM CHLORIDE 9 MG/ML
1000 INJECTION INTRAMUSCULAR; INTRAVENOUS; SUBCUTANEOUS
Refills: 0 | Status: DISCONTINUED | OUTPATIENT
Start: 2023-12-08 | End: 2023-12-10

## 2023-12-08 RX ADMIN — SODIUM CHLORIDE 82 MILLILITER(S): 9 INJECTION INTRAMUSCULAR; INTRAVENOUS; SUBCUTANEOUS at 15:57

## 2023-12-08 RX ADMIN — Medication 60 MILLIGRAM(S): at 06:04

## 2023-12-08 RX ADMIN — LISINOPRIL 5 MILLIGRAM(S): 2.5 TABLET ORAL at 06:05

## 2023-12-08 RX ADMIN — Medication 1 MILLIGRAM(S): at 12:00

## 2023-12-08 RX ADMIN — PANTOPRAZOLE SODIUM 40 MILLIGRAM(S): 20 TABLET, DELAYED RELEASE ORAL at 06:04

## 2023-12-08 RX ADMIN — Medication 40 MILLIEQUIVALENT(S): at 15:57

## 2023-12-08 RX ADMIN — Medication 40 MILLIEQUIVALENT(S): at 23:43

## 2023-12-08 RX ADMIN — PREGABALIN 1000 MICROGRAM(S): 225 CAPSULE ORAL at 12:00

## 2023-12-08 RX ADMIN — SODIUM CHLORIDE 120 MILLILITER(S): 9 INJECTION INTRAMUSCULAR; INTRAVENOUS; SUBCUTANEOUS at 12:01

## 2023-12-08 NOTE — PROGRESS NOTE ADULT - SUBJECTIVE AND OBJECTIVE BOX
Patient is a 83y old  Male who presents with a chief complaint of muscle weakness (04 Dec 2023 13:42)      Patient seen and examined at bedside this morning     Vital Signs Last 24 Hrs  T(C): 36.6 (08 Dec 2023 08:51), Max: 36.7 (07 Dec 2023 16:13)  T(F): 97.9 (08 Dec 2023 08:51), Max: 98.1 (07 Dec 2023 20:39)  HR: 65 (08 Dec 2023 08:51) (57 - 71)  BP: 143/68 (08 Dec 2023 08:51) (142/70 - 153/68)  BP(mean): 103 (07 Dec 2023 16:13) (103 - 103)  RR: 18 (08 Dec 2023 08:51) (18 - 20)  SpO2: 93% (08 Dec 2023 08:51) (93% - 98%)    Parameters below as of 08 Dec 2023 08:51  Patient On (Oxygen Delivery Method): room air          PHYSICAL EXAM:  General: awake alert   Lungs: CTA , no wheezing   Cardio: RRR, S1/S2, No murmur  Abdomen: Soft, Nontender, Nondistended; Bowel sounds present  Extremities: No calf tenderness, No pitting edema  Skin : warm . normal color   Neuro awake alert oriented , upper ext proximal weakness             Norovirus GI/GII: Detected (12.06.23 @ 12:42)                                    11.0   15.34 )-----------( 379      ( 08 Dec 2023 06:21 )             31.9   12-08    141  |  109<H>  |  15.3  ----------------------------<  91  3.4<L>   |  25.0  |  0.47<L>    Ca    7.6<L>      08 Dec 2023 06:21    TPro  5.1<L>  /  Alb  2.6<L>  /  TBili  0.4  /  DBili  x   /  AST  420<H>  /  ALT  482<H>  /  AlkPhos  108  12-08

## 2023-12-08 NOTE — PROGRESS NOTE ADULT - ASSESSMENT
83y Male who is followed by neurology because of weakness, proximal>distal for about 3 weeks, painless with elevated muscle enzymes (rhabdo) and myoglobinuria.  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  Rheumatology has been consulted and made recommendations for blood work, MRI and possible muscle biopsy.  Agree with steroids for now pending results.   Myoglobin and LDH trending down with steroids  Follow serial CPKs  Given overall picture this does not appear to be consistent with stroke or spine disease (cord compression or myelopathy).  PT/OT eval/treat.  Watch renal function given rhabdomyolysis.    MRI RUE shows non specific myositis. Pending muscle biopsy for diagnosis.  Of note, pathology is not reviewed here, but sent out.

## 2023-12-08 NOTE — PROGRESS NOTE ADULT - SUBJECTIVE AND OBJECTIVE BOX
Subjective: Patient resting in bed this morning. No acute issues overnight. Denies chest pain or shortness of breath. continues to report proximal weakness.       MEDICATIONS  (STANDING):  cyanocobalamin 1000 MICROGram(s) Oral daily  folic acid 1 milliGRAM(s) Oral daily  lisinopril 5 milliGRAM(s) Oral daily  pantoprazole    Tablet 40 milliGRAM(s) Oral before breakfast  predniSONE   Tablet 60 milliGRAM(s) Oral daily  sodium chloride 0.9%. 1000 milliLiter(s) (120 mL/Hr) IV Continuous <Continuous>    MEDICATIONS  (PRN):      Vital Signs Last 24 Hrs  T(C): 36.6 (08 Dec 2023 08:51), Max: 36.7 (07 Dec 2023 16:13)  T(F): 97.9 (08 Dec 2023 08:51), Max: 98.1 (07 Dec 2023 20:39)  HR: 65 (08 Dec 2023 08:51) (57 - 71)  BP: 143/68 (08 Dec 2023 08:51) (142/70 - 153/68)  BP(mean): 103 (07 Dec 2023 16:13) (103 - 103)  RR: 18 (08 Dec 2023 08:51) (18 - 20)  SpO2: 93% (08 Dec 2023 08:51) (93% - 98%)    Parameters below as of 08 Dec 2023 08:51  Patient On (Oxygen Delivery Method): room air        Physical Exam:    Constitutional: NAD  Gastrointestinal: Soft, non-tender, non-distended  Extremities: Proximal muscle weakness  Musculoskeletal: Proximal muscle weakness      LABS:                        11.0   15.34 )-----------( 379      ( 08 Dec 2023 06:21 )             31.9     12-08    141  |  109<H>  |  15.3  ----------------------------<  91  3.4<L>   |  25.0  |  0.47<L>    Ca    7.6<L>      08 Dec 2023 06:21    TPro  5.1<L>  /  Alb  2.6<L>  /  TBili  0.4  /  DBili  x   /  AST  420<H>  /  ALT  482<H>  /  AlkPhos  108  12-08      Urinalysis Basic - ( 08 Dec 2023 06:21 )    Color: x / Appearance: x / SG: x / pH: x  Gluc: 91 mg/dL / Ketone: x  / Bili: x / Urobili: x   Blood: x / Protein: x / Nitrite: x   Leuk Esterase: x / RBC: x / WBC x   Sq Epi: x / Non Sq Epi: x / Bacteria: x        A:     Plan:   -

## 2023-12-08 NOTE — PROGRESS NOTE ADULT - ASSESSMENT
A: 82yo M w/ hx of HTN and HL presents with progressive muscle weakness and labs/imaging findings concerning for polymyositis. Given recent code sepsis, WBC 17, and concern for developing PNA, will hold off on OR scheduling until pt is optimized from an infection standpoint.     Plan:  - will trend fever curve, WBC  - f/u blood cultures  - monitor infectious symptoms  - continue to plan for muscle biopsy once pt is optimized  - general surgery will continue to follow  - Rest of care per primary team A: 84yo M w/ hx of HTN and HL presents with progressive muscle weakness and labs/imaging findings concerning for polymyositis. Given recent code sepsis, WBC 17, and concern for developing PNA, will hold off on OR scheduling until pt is optimized from an infection standpoint.     Plan:  - will trend fever curve, WBC  - f/u blood cultures  - monitor infectious symptoms  - continue to plan for muscle biopsy once pt is optimized  - general surgery will continue to follow  - Rest of care per primary team No

## 2023-12-08 NOTE — PROGRESS NOTE ADULT - SUBJECTIVE AND OBJECTIVE BOX
[facility]                                        Neurology at [place]                                 [facility]                                  [address]                                        [place], [place], [address]                                             [phone]        CC: Myopathy.    HPI:   The patient is a 83y Male who presented with b/l proximal weakness for 3 weeks.  It started in the right arm and proceeded to effect left arm and then legs.  He is unable to raise arms to brush hair or arise from seated position without using hands to push up.  He says it started suddenly 3 weeks ago.  He also noted his urine being dark at roughly the same time.  He has no family history of weakness to suggest a muscular dystrophy and has no physical features of myotonic dystrophy.  he was at [facility] about  a week ago with CPK about 15,000 but apparently signed out AMA prior to evaluation  he returned yesterday because he was not getting better.  Neurology is asked to evaluate. (AR).    Interim history:  Remains on [address] [place].     ROS:   Denies headache or dizziness.  Denies chest pain.  Denies shortness of breath.    MEDICATIONS  (STANDING):  cyanocobalamin 1000 MICROGram(s) Oral daily  folic acid 1 milliGRAM(s) Oral daily  lisinopril 5 milliGRAM(s) Oral daily  pantoprazole    Tablet 40 milliGRAM(s) Oral before breakfast  predniSONE   Tablet 60 milliGRAM(s) Oral daily  sodium chloride 0.9%. 1000 milliLiter(s) (120 mL/Hr) IV Continuous <Continuous>    Vital Signs Last 24 Hrs  T(C): 36.6 (08 Dec 2023 08:51), Max: 36.7 (07 Dec 2023 16:13)  T(F): 97.9 (08 Dec 2023 08:51), Max: 98.1 (07 Dec 2023 20:39)  HR: 65 (08 Dec 2023 08:51) (57 - 71)  BP: 143/68 (08 Dec 2023 08:51) (142/70 - 153/68)  BP(mean): 103 (07 Dec 2023 16:13) (103 - 103)  RR: 18 (08 Dec 2023 08:51) (18 - 20)  SpO2: 93% (08 Dec 2023 08:51) (93% - 98%)    Parameters below as of 08 Dec 2023 08:51  Patient On (Oxygen Delivery Method): room air    Detailed Neurologic Exam:    Mental status: The patient is awake and alert. There is no aphasia. There is no dysarthria.     Cranial nerves: Pupils equal and react symmetrically to light. There is no visual field deficit to confrontation. Extraocular motion is full with no nystagmus. There is no ptosis. Facial sensation is intact. Facial musculature is symmetric. Palate elevates symmetrically. Tongue is midline.    Motor: There is normal bulk and tone.  There is no tremor.  Strength is 1-2/5 in the right deltoid unable to abduct well  has 4+ - 5/5 distal power in arm. Has 2/5 IPS better (4+/5) in distal right leg.   Strength is 1-2/5 in the left deltoid unable to abduct well  has 4+ - 5/5 distal power in arm. Has 1-2/5 IPS better (4+/5) in distal left leg.     Sensation: Intact to light touch and pin in 4 extremities    Reflexes: 2+ patella and biceps and plantar responses are flexor.    Cerebellar: There is no dysmetria on finger to nose testing given limitations of shoulder weakness.    Labs:     12-08    141  |  109<H>  |  15.3  ----------------------------<  91  3.4<L>   |  25.0  |  0.47<L>    Ca    7.6<L>      08 Dec 2023 06:21    TPro  5.1<L>  /  Alb  2.6<L>  /  TBili  0.4  /  DBili  x   /  AST  420<H>  /  ALT  482<H>  /  AlkPhos  108  12-08                            11.0   15.34 )-----------( 379      ( 08 Dec 2023 06:21 )             31.9     CK: 6619    Rad:   MR Upper Ext Non-joint w/ IV Cont, Right (12.05.23 @ 19:02)   IMPRESSION: Limited exam.  Nonspecific multifocal muscular signal alteration, suggesting nonspecific   myositis.  Advanced rotator cuff arthropathy at the glenohumeral joint.                              [facility]                                        Neurology at [place]                                 [facility]                                  [address]                                        [place], [place], [address]                                             [phone]        CC: Myopathy.    HPI:   The patient is a 83y Male who presented with b/l proximal weakness for 3 weeks.  It started in the right arm and proceeded to effect left arm and then legs.  He is unable to raise arms to brush hair or arise from seated position without using hands to push up.  He says it started suddenly 3 weeks ago.  He also noted his urine being dark at roughly the same time.  He has no family history of weakness to suggest a muscular dystrophy and has no physical features of myotonic dystrophy.  he was at [facility] about  a week ago with CPK about 15,000 but apparently signed out AMA prior to evaluation  he returned yesterday because he was not getting better.  Neurology is asked to evaluate. (AR).    Interim history:  Remains on 4 [place].     ROS:   Denies headache or dizziness.  Denies chest pain.  Denies shortness of breath.    MEDICATIONS  (STANDING):  cyanocobalamin 1000 MICROGram(s) Oral daily  folic acid 1 milliGRAM(s) Oral daily  lisinopril 5 milliGRAM(s) Oral daily  pantoprazole    Tablet 40 milliGRAM(s) Oral before breakfast  predniSONE   Tablet 60 milliGRAM(s) Oral daily  sodium chloride 0.9%. 1000 milliLiter(s) (120 mL/Hr) IV Continuous <Continuous>    Vital Signs Last 24 Hrs  T(C): 36.6 (08 Dec 2023 08:51), Max: 36.7 (07 Dec 2023 16:13)  T(F): 97.9 (08 Dec 2023 08:51), Max: 98.1 (07 Dec 2023 20:39)  HR: 65 (08 Dec 2023 08:51) (57 - 71)  BP: 143/68 (08 Dec 2023 08:51) (142/70 - 153/68)  BP(mean): 103 (07 Dec 2023 16:13) (103 - 103)  RR: 18 (08 Dec 2023 08:51) (18 - 20)  SpO2: 93% (08 Dec 2023 08:51) (93% - 98%)    Parameters below as of 08 Dec 2023 08:51  Patient On (Oxygen Delivery Method): room air    Detailed Neurologic Exam:    Mental status: The patient is awake and alert. There is no aphasia. There is no dysarthria.     Cranial nerves: Pupils equal and react symmetrically to light. There is no visual field deficit to confrontation. Extraocular motion is full with no nystagmus. There is no ptosis. Facial sensation is intact. Facial musculature is symmetric. Palate elevates symmetrically. Tongue is midline.    Motor: There is normal bulk and tone.  There is no tremor.  Strength is 1-2/5 in the right deltoid unable to abduct well  has 4+ - 5/5 distal power in arm. Has 2/5 IPS better (4+/5) in distal right leg.   Strength is 1-2/5 in the left deltoid unable to abduct well  has 4+ - 5/5 distal power in arm. Has 1-2/5 IPS better (4+/5) in distal left leg.     Sensation: Intact to light touch and pin in 4 extremities    Reflexes: 2+ patella and biceps and plantar responses are flexor.    Cerebellar: There is no dysmetria on finger to nose testing given limitations of shoulder weakness.    Labs:     12-08    141  |  109<H>  |  15.3  ----------------------------<  91  3.4<L>   |  25.0  |  0.47<L>    Ca    7.6<L>      08 Dec 2023 06:21    TPro  5.1<L>  /  Alb  2.6<L>  /  TBili  0.4  /  DBili  x   /  AST  420<H>  /  ALT  482<H>  /  AlkPhos  108  12-08                            11.0   15.34 )-----------( 379      ( 08 Dec 2023 06:21 )             31.9     CK: 6619    Rad:   MR Upper Ext Non-joint w/ IV Cont, Right (12.05.23 @ 19:02)   IMPRESSION: Limited exam.  Nonspecific multifocal muscular signal alteration, suggesting nonspecific   myositis.  Advanced rotator cuff arthropathy at the glenohumeral joint.

## 2023-12-08 NOTE — PROGRESS NOTE ADULT - ASSESSMENT
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

## 2023-12-08 NOTE — PROGRESS NOTE ADULT - SUBJECTIVE AND OBJECTIVE BOX
NEPHROLOGY INTERVAL HPI/OVERNIGHT EVENTS:    Examined earlier  Sitting in chair no distress  + Watery diarrhea  Denies HA CP no SOB    MEDICATIONS  (STANDING):  cyanocobalamin 1000 MICROGram(s) Oral daily  folic acid 1 milliGRAM(s) Oral daily  lisinopril 5 milliGRAM(s) Oral daily  pantoprazole    Tablet 40 milliGRAM(s) Oral before breakfast  potassium chloride    Tablet ER 40 milliEquivalent(s) Oral every 4 hours  predniSONE   Tablet 60 milliGRAM(s) Oral daily  sodium chloride 0.9%. 1000 milliLiter(s) (120 mL/Hr) IV Continuous <Continuous>    MEDICATIONS  (PRN):      Allergies    No Known Allergies    Intolerances        Vital Signs Last 24 Hrs  T(C): 36.6 (08 Dec 2023 08:51), Max: 36.7 (07 Dec 2023 16:13)  T(F): 97.9 (08 Dec 2023 08:51), Max: 98.1 (07 Dec 2023 20:39)  HR: 65 (08 Dec 2023 08:51) (57 - 71)  BP: 143/68 (08 Dec 2023 08:51) (143/68 - 153/68)  BP(mean): 103 (07 Dec 2023 16:13) (103 - 103)  RR: 18 (08 Dec 2023 08:51) (18 - 20)  SpO2: 93% (08 Dec 2023 08:51) (93% - 98%)    Parameters below as of 08 Dec 2023 08:51  Patient On (Oxygen Delivery Method): room air      PHYSICAL EXAM:  GENERAL: Weak  HEENT: NCAT  NECK: Supple, No JVD  NERVOUS SYSTEM:  Alert & Oriented X3  CHEST/LUNG: Clear bilaterally  HEART: Regular rate and rhythm; No rub  ABDOMEN: Soft, Nontender, +BS  EXTREMITIES: + LE edema trace    LABS:                        11.0   15.34 )-----------( 379      ( 08 Dec 2023 06:21 )             31.9     12-08    141  |  109<H>  |  15.3  ----------------------------<  91  3.4<L>   |  25.0  |  0.47<L>    Ca    7.6<L>      08 Dec 2023 06:21    TPro  5.1<L>  /  Alb  2.6<L>  /  TBili  0.4  /  DBili  x   /  AST  420<H>  /  ALT  482<H>  /  AlkPhos  108  12-08      Urinalysis Basic - ( 08 Dec 2023 06:21 )    Color: x / Appearance: x / SG: x / pH: x  Gluc: 91 mg/dL / Ketone: x  / Bili: x / Urobili: x   Blood: x / Protein: x / Nitrite: x   Leuk Esterase: x / RBC: x / WBC x   Sq Epi: x / Non Sq Epi: x / Bacteria: x              RADIOLOGY & ADDITIONAL TESTS:

## 2023-12-08 NOTE — PROGRESS NOTE ADULT - ASSESSMENT
Acute(?) rhabdo w/o renal failure  UA: + blood + protein   r/o underlying Rheumatologic or Endocrinologic etiology  Rhabdomyolysis improving  TFTs, cortisol normal  CPKs trending down  - avoid potential nephrotoxins  - cont IVF rate decreased  - MR of arm showed monspecific myositis cont prednisone  plans for muscle Bx  Rheum noted---> serologies pending; possible muscle biopsy    AM labs will follow

## 2023-12-09 LAB
ANION GAP SERPL CALC-SCNC: 11 MMOL/L — SIGNIFICANT CHANGE UP (ref 5–17)
ANION GAP SERPL CALC-SCNC: 11 MMOL/L — SIGNIFICANT CHANGE UP (ref 5–17)
BUN SERPL-MCNC: 14.2 MG/DL — SIGNIFICANT CHANGE UP (ref 8–20)
BUN SERPL-MCNC: 14.2 MG/DL — SIGNIFICANT CHANGE UP (ref 8–20)
CALCIUM SERPL-MCNC: 8.3 MG/DL — LOW (ref 8.4–10.5)
CALCIUM SERPL-MCNC: 8.3 MG/DL — LOW (ref 8.4–10.5)
CHLORIDE SERPL-SCNC: 106 MMOL/L — SIGNIFICANT CHANGE UP (ref 96–108)
CHLORIDE SERPL-SCNC: 106 MMOL/L — SIGNIFICANT CHANGE UP (ref 96–108)
CO2 SERPL-SCNC: 23 MMOL/L — SIGNIFICANT CHANGE UP (ref 22–29)
CO2 SERPL-SCNC: 23 MMOL/L — SIGNIFICANT CHANGE UP (ref 22–29)
CREAT SERPL-MCNC: 0.49 MG/DL — LOW (ref 0.5–1.3)
CREAT SERPL-MCNC: 0.49 MG/DL — LOW (ref 0.5–1.3)
EGFR: 102 ML/MIN/1.73M2 — SIGNIFICANT CHANGE UP
EGFR: 102 ML/MIN/1.73M2 — SIGNIFICANT CHANGE UP
GLUCOSE SERPL-MCNC: 117 MG/DL — HIGH (ref 70–99)
GLUCOSE SERPL-MCNC: 117 MG/DL — HIGH (ref 70–99)
POTASSIUM SERPL-MCNC: 4.3 MMOL/L — SIGNIFICANT CHANGE UP (ref 3.5–5.3)
POTASSIUM SERPL-MCNC: 4.3 MMOL/L — SIGNIFICANT CHANGE UP (ref 3.5–5.3)
POTASSIUM SERPL-SCNC: 4.3 MMOL/L — SIGNIFICANT CHANGE UP (ref 3.5–5.3)
POTASSIUM SERPL-SCNC: 4.3 MMOL/L — SIGNIFICANT CHANGE UP (ref 3.5–5.3)
SODIUM SERPL-SCNC: 140 MMOL/L — SIGNIFICANT CHANGE UP (ref 135–145)
SODIUM SERPL-SCNC: 140 MMOL/L — SIGNIFICANT CHANGE UP (ref 135–145)

## 2023-12-09 PROCEDURE — 99233 SBSQ HOSP IP/OBS HIGH 50: CPT

## 2023-12-09 PROCEDURE — 99232 SBSQ HOSP IP/OBS MODERATE 35: CPT

## 2023-12-09 RX ADMIN — PREGABALIN 1000 MICROGRAM(S): 225 CAPSULE ORAL at 12:36

## 2023-12-09 RX ADMIN — PANTOPRAZOLE SODIUM 40 MILLIGRAM(S): 20 TABLET, DELAYED RELEASE ORAL at 05:35

## 2023-12-09 RX ADMIN — Medication 1 MILLIGRAM(S): at 12:36

## 2023-12-09 RX ADMIN — SODIUM CHLORIDE 82 MILLILITER(S): 9 INJECTION INTRAMUSCULAR; INTRAVENOUS; SUBCUTANEOUS at 10:11

## 2023-12-09 RX ADMIN — Medication 60 MILLIGRAM(S): at 05:34

## 2023-12-09 RX ADMIN — LISINOPRIL 5 MILLIGRAM(S): 2.5 TABLET ORAL at 05:35

## 2023-12-09 NOTE — PROGRESS NOTE ADULT - SUBJECTIVE AND OBJECTIVE BOX
SURGERY      INTERVAL EVENTS/SUBJECTIVE:   Patient resting in bed this morning. No acute issues overnight. Denies chest pain or shortness of breath. continues to report proximal weakness.    ______________________________________________  OBJECTIVE:   T(C): 36.5 (12-08-23 @ 21:30), Max: 36.6 (12-08-23 @ 08:51)  HR: 68 (12-08-23 @ 21:30) (64 - 71)  BP: 155/67 (12-08-23 @ 21:30) (142/70 - 155/67)  RR: 18 (12-08-23 @ 21:30) (18 - 18)  SpO2: 94% (12-08-23 @ 21:30) (93% - 98%)  Wt(kg): --  CAPILLARY BLOOD GLUCOSE        I&O's Detail    08 Dec 2023 07:01  -  09 Dec 2023 03:23  --------------------------------------------------------  IN:    Oral Fluid: 1200 mL  Total IN: 1200 mL    OUT:  Total OUT: 0 mL    Total NET: 1200 mL          Physical exam:  Gen: resting in bed comfortably in NAD  Chest: no increased WOB, regular inspiratory effort   Abdomen: Soft, nontender, nondistended with no rebound tenderness or guarding. Incisions clean, dry, intact, with no erythema.   Vascular: WWP, DIAZ x4  NEURO: awake, alert  ______________________________________________  LABS:  CBC Full  -  ( 08 Dec 2023 06:21 )  WBC Count : 15.34 K/uL  RBC Count : 3.42 M/uL  Hemoglobin : 11.0 g/dL  Hematocrit : 31.9 %  Platelet Count - Automated : 379 K/uL  Mean Cell Volume : 93.3 fl  Mean Cell Hemoglobin : 32.2 pg  Mean Cell Hemoglobin Concentration : 34.5 gm/dL  Auto Neutrophil # : 11.03 K/uL  Auto Lymphocyte # : 2.49 K/uL  Auto Monocyte # : 1.23 K/uL  Auto Eosinophil # : 0.35 K/uL  Auto Basophil # : 0.07 K/uL  Auto Neutrophil % : 71.9 %  Auto Lymphocyte % : 16.2 %  Auto Monocyte % : 8.0 %  Auto Eosinophil % : 2.3 %  Auto Basophil % : 0.5 %    12-08    141  |  109<H>  |  15.3  ----------------------------<  91  3.4<L>   |  25.0  |  0.47<L>    Ca    7.6<L>      08 Dec 2023 06:21    TPro  5.1<L>  /  Alb  2.6<L>  /  TBili  0.4  /  DBili  x   /  AST  420<H>  /  ALT  482<H>  /  AlkPhos  108  12-08

## 2023-12-09 NOTE — PROGRESS NOTE ADULT - SUBJECTIVE AND OBJECTIVE BOX
[facility]                                        Neurology at [place]                                 [facility]                                  [address]                                        [place], [place], [address]                                             [phone]        CC: Myopathy.    HPI:   The patient is a 83y Male who presented with b/l proximal weakness for 3 weeks.  It started in the right arm and proceeded to effect left arm and then legs.  He is unable to raise arms to brush hair or arise from seated position without using hands to push up.  He says it started suddenly 3 weeks ago.  He also noted his urine being dark at roughly the same time.  He has no family history of weakness to suggest a muscular dystrophy and has no physical features of myotonic dystrophy.  he was at [facility] about  a week ago with CPK about 15,000 but apparently signed out AMA prior to evaluation  he returned yesterday because he was not getting better.  Neurology is asked to evaluate. (AR).    Interim history:  Remains on 4 [place].     ROS:   Denies headache or dizziness.  Denies chest pain.  Denies shortness of breath.    MEDICATIONS  (STANDING):  cyanocobalamin 1000 MICROGram(s) Oral daily  folic acid 1 milliGRAM(s) Oral daily  lisinopril 5 milliGRAM(s) Oral daily  pantoprazole    Tablet 40 milliGRAM(s) Oral before breakfast  predniSONE   Tablet 60 milliGRAM(s) Oral daily  sodium chloride 0.9%. 1000 milliLiter(s) (82 mL/Hr) IV Continuous <Continuous>    Vital Signs Last 24 Hrs  T(C): 36.4 (09 Dec 2023 08:30), Max: 36.6 (09 Dec 2023 05:11)  T(F): 97.6 (09 Dec 2023 08:30), Max: 97.9 (09 Dec 2023 05:11)  HR: 66 (09 Dec 2023 08:30) (62 - 68)  BP: 154/81 (09 Dec 2023 08:30) (132/69 - 155/67)  RR: 18 (09 Dec 2023 08:30) (18 - 18)  SpO2: 96% (09 Dec 2023 08:30) (94% - 96%)    Parameters below as of 09 Dec 2023 08:30  Patient On (Oxygen Delivery Method): room air    Detailed Neurologic Exam:    Mental status: The patient is awake and alert. There is no aphasia. There is no dysarthria.     Cranial nerves: Pupils equal and react symmetrically to light. There is no visual field deficit to confrontation. Extraocular motion is full with no nystagmus. There is no ptosis. Facial sensation is intact. Facial musculature is symmetric. Palate elevates symmetrically. Tongue is midline.    Motor: There is normal bulk and tone.  There is no tremor.  Strength is 1-2/5 in the right deltoid unable to abduct well  has 5/5 distal power in arm. Has 2/5 IPS better (5/5) in distal right leg.   Strength is 1-2/5 in the left deltoid unable to abduct well  has 5/5 distal power in arm. Has 1-2/5 IPS better (5/5) in distal left leg.     Sensation: Intact to light touch and pin in 4 extremities    Reflexes: 1+ patella and biceps and plantar responses are flexor.    Cerebellar: There is no dysmetria on finger to nose testing given limitations of shoulder weakness.    Labs:     12-09    140  |  106  |  14.2  ----------------------------<  117<H>  4.3   |  23.0  |  0.49<L>    Ca    8.3<L>      09 Dec 2023 08:16    TPro  5.1<L>  /  Alb  2.6<L>  /  TBili  0.4  /  DBili  x   /  AST  420<H>  /  ALT  482<H>  /  AlkPhos  108  12-08                            11.0   15.34 )-----------( 379      ( 08 Dec 2023 06:21 )             31.9       Rad:   ***                                   [facility]                                        Neurology at [place]                                 [facility]                                  [address]                                        [place], [place], [address]                                             [phone]        CC: Myopathy.    HPI:   The patient is a 83y Male who presented with b/l proximal weakness for 3 weeks.  It started in the right arm and proceeded to effect left arm and then legs.  He is unable to raise arms to brush hair or arise from seated position without using hands to push up.  He says it started suddenly 3 weeks ago.  He also noted his urine being dark at roughly the same time.  He has no family history of weakness to suggest a muscular dystrophy and has no physical features of myotonic dystrophy.  he was at [facility] about  a week ago with CPK about 15,000 but apparently signed out AMA prior to evaluation  he returned yesterday because he was not getting better.  Neurology is asked to evaluate. (AR).    Interim history:  Remains on 4 [place].     ROS:   Denies headache or dizziness.  Denies chest pain.  Denies shortness of breath.    MEDICATIONS  (STANDING):  cyanocobalamin 1000 MICROGram(s) Oral daily  folic acid 1 milliGRAM(s) Oral daily  lisinopril 5 milliGRAM(s) Oral daily  pantoprazole    Tablet 40 milliGRAM(s) Oral before breakfast  predniSONE   Tablet 60 milliGRAM(s) Oral daily  sodium chloride 0.9%. 1000 milliLiter(s) (82 mL/Hr) IV Continuous <Continuous>    Vital Signs Last 24 Hrs  T(C): 36.4 (09 Dec 2023 08:30), Max: 36.6 (09 Dec 2023 05:11)  T(F): 97.6 (09 Dec 2023 08:30), Max: 97.9 (09 Dec 2023 05:11)  HR: 66 (09 Dec 2023 08:30) (62 - 68)  BP: 154/81 (09 Dec 2023 08:30) (132/69 - 155/67)  RR: 18 (09 Dec 2023 08:30) (18 - 18)  SpO2: 96% (09 Dec 2023 08:30) (94% - 96%)    Parameters below as of 09 Dec 2023 08:30  Patient On (Oxygen Delivery Method): room air    Detailed Neurologic Exam:    Mental status: The patient is awake and alert. There is no aphasia. There is no dysarthria.     Cranial nerves: Pupils equal and react symmetrically to light. There is no visual field deficit to confrontation. Extraocular motion is full with no nystagmus. There is no ptosis. Facial sensation is intact. Facial musculature is symmetric. Palate elevates symmetrically. Tongue is midline.    Motor: There is normal bulk and tone.  There is no tremor.  Strength is 1-2/5 in the right deltoid unable to abduct well  has 5/5 distal power in arm. Has 2/5 IPS better (5/5) in distal right leg.   Strength is 1-2/5 in the left deltoid unable to abduct well  has 5/5 distal power in arm. Has 1-2/5 IPS better (5/5) in distal left leg.     Sensation: Intact to light touch and pin in 4 extremities    Reflexes: 1+ patella and biceps and plantar responses are flexor.    Cerebellar: There is no dysmetria on finger to nose testing given limitations of shoulder weakness.    Labs:     12-09    140  |  106  |  14.2  ----------------------------<  117<H>  4.3   |  23.0  |  0.49<L>    Ca    8.3<L>      09 Dec 2023 08:16    TPro  5.1<L>  /  Alb  2.6<L>  /  TBili  0.4  /  DBili  x   /  AST  420<H>  /  ALT  482<H>  /  AlkPhos  108  12-08                            11.0   15.34 )-----------( 379      ( 08 Dec 2023 06:21 )             31.9       Rad:   ***

## 2023-12-09 NOTE — PROGRESS NOTE ADULT - ASSESSMENT
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

## 2023-12-09 NOTE — PROGRESS NOTE ADULT - ASSESSMENT
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

## 2023-12-09 NOTE — PROGRESS NOTE ADULT - SUBJECTIVE AND OBJECTIVE BOX
NEPHROLOGY INTERVAL HPI/OVERNIGHT EVENTS:    Examined earlier  No distress  still has diarrhea  Denies HA CP no SOB    MEDICATIONS  (STANDING):  cyanocobalamin 1000 MICROGram(s) Oral daily  folic acid 1 milliGRAM(s) Oral daily  lisinopril 5 milliGRAM(s) Oral daily  pantoprazole    Tablet 40 milliGRAM(s) Oral before breakfast  predniSONE   Tablet 60 milliGRAM(s) Oral daily  sodium chloride 0.9%. 1000 milliLiter(s) (82 mL/Hr) IV Continuous <Continuous>    MEDICATIONS  (PRN):      Allergies    No Known Allergies    Intolerances        Vital Signs Last 24 Hrs  T(C): 36.4 (09 Dec 2023 08:30), Max: 36.6 (09 Dec 2023 05:11)  T(F): 97.6 (09 Dec 2023 08:30), Max: 97.9 (09 Dec 2023 05:11)  HR: 66 (09 Dec 2023 08:30) (62 - 68)  BP: 154/81 (09 Dec 2023 08:30) (132/69 - 155/67)  BP(mean): --  RR: 18 (09 Dec 2023 08:30) (18 - 18)  SpO2: 96% (09 Dec 2023 08:30) (94% - 96%)    Parameters below as of 09 Dec 2023 08:30  Patient On (Oxygen Delivery Method): room air      Daily     Daily Weight in k (09 Dec 2023 05:11)    PHYSICAL EXAM:  GENERAL: NAD  HEENT: NCAT  NECK: Supple, No JVD  NERVOUS SYSTEM:  Alert & Oriented X3  CHEST/LUNG: Clear bilaterally  HEART: Regular rate and rhythm; No rub  ABDOMEN: Soft, Nontender, +BS  EXTREMITIES: + LE edema trace      LABS:                        11.0   15.34 )-----------( 379      ( 08 Dec 2023 06:21 )             31.9     12-09    140  |  106  |  14.2  ----------------------------<  117<H>  4.3   |  23.0  |  0.49<L>    Ca    8.3<L>      09 Dec 2023 08:16    TPro  5.1<L>  /  Alb  2.6<L>  /  TBili  0.4  /  DBili  x   /  AST  420<H>  /  ALT  482<H>  /  AlkPhos  108  12-08      Urinalysis Basic - ( 09 Dec 2023 08:16 )    Color: x / Appearance: x / SG: x / pH: x  Gluc: 117 mg/dL / Ketone: x  / Bili: x / Urobili: x   Blood: x / Protein: x / Nitrite: x   Leuk Esterase: x / RBC: x / WBC x   Sq Epi: x / Non Sq Epi: x / Bacteria: x              RADIOLOGY & ADDITIONAL TESTS:

## 2023-12-09 NOTE — PROGRESS NOTE ADULT - SUBJECTIVE AND OBJECTIVE BOX
[facility] Division of [facility]    SUBJECTIVE / OVERNIGHT EVENTS: Pt seen at the bedside. States feeling same as day before.  Patient denies chest pain, SOB, abd pain, N/V, fever, chills, dysuria or any other complaints. All remainder ROS negative.     MEDICATIONS  (STANDING):  cyanocobalamin 1000 MICROGram(s) Oral daily  folic acid 1 milliGRAM(s) Oral daily  lisinopril 5 milliGRAM(s) Oral daily  pantoprazole    Tablet 40 milliGRAM(s) Oral before breakfast  predniSONE   Tablet 60 milliGRAM(s) Oral daily  sodium chloride 0.9%. 1000 milliLiter(s) (82 mL/Hr) IV Continuous <Continuous>    MEDICATIONS  (PRN):      I&O's Summary    08 Dec 2023 07:01  -  09 Dec 2023 07:00  --------------------------------------------------------  IN: 1200 mL / OUT: 0 mL / NET: 1200 mL        PHYSICAL EXAM:  Vital Signs Last 24 Hrs  T(C): 36.5 (09 Dec 2023 16:58), Max: 36.6 (09 Dec 2023 05:11)  T(F): 97.7 (09 Dec 2023 16:58), Max: 97.9 (09 Dec 2023 05:11)  HR: 70 (09 Dec 2023 16:58) (62 - 70)  BP: 134/71 (09 Dec 2023 16:58) (132/69 - 155/67)  BP(mean): 92 (09 Dec 2023 16:58) (92 - 92)  RR: 18 (09 Dec 2023 16:58) (18 - 18)  SpO2: 94% (09 Dec 2023 16:58) (94% - 96%)    Parameters below as of 09 Dec 2023 16:58  Patient On (Oxygen Delivery Method): room air          GENERAL: not in acute distress  HEENT:  Clear conjunctiva, PERRL, moist oral mucosa  RESP:  Non-labored breathing pattern, lungs clear to ausculation, no wheezes or crackles appreciated  CV: Regular rate and rhythm, no murmurs appreciated, no lower extremity edema, peripheral pulses are 2+ bilaterally  GI: Soft, non-tender, non-distended  NEURO: Awake, alert, conversant, upper and lower extremity strength 5/5 except when trying to lift arms above shoulder level, unable to keep arms elevated when placed above that height, light touch sensation grossly intact  PSYCH: Calm, cooperative, A&Ox3  SKIN: No rash or lesions, warm and dry      LABS:                        11.0   15.34 )-----------( 379      ( 08 Dec 2023 06:21 )             31.9     12-09    140  |  106  |  14.2  ----------------------------<  117<H>  4.3   |  23.0  |  0.49<L>    Ca    8.3<L>      09 Dec 2023 08:16    TPro  5.1<L>  /  Alb  2.6<L>  /  TBili  0.4  /  DBili  x   /  AST  420<H>  /  ALT  482<H>  /  AlkPhos  108  12-08      CARDIAC MARKERS ( 08 Dec 2023 06:21 )  x     / x     / 6619 U/L / x     / 153.5 ng/mL      Urinalysis Basic - ( 09 Dec 2023 08:16 )    Color: x / Appearance: x / SG: x / pH: x  Gluc: 117 mg/dL / Ketone: x  / Bili: x / Urobili: x   Blood: x / Protein: x / Nitrite: x   Leuk Esterase: x / RBC: x / WBC x   Sq Epi: x / Non Sq Epi: x / Bacteria: x        CAPILLARY BLOOD GLUCOSE          IMAGING:                                   [facility]    SUBJECTIVE / OVERNIGHT EVENTS: Pt seen at the bedside. States feeling same as day before.  Patient denies chest pain, SOB, abd pain, N/V, fever, chills, dysuria or any other complaints. All remainder ROS negative.     MEDICATIONS  (STANDING):  cyanocobalamin 1000 MICROGram(s) Oral daily  folic acid 1 milliGRAM(s) Oral daily  lisinopril 5 milliGRAM(s) Oral daily  pantoprazole    Tablet 40 milliGRAM(s) Oral before breakfast  predniSONE   Tablet 60 milliGRAM(s) Oral daily  sodium chloride 0.9%. 1000 milliLiter(s) (82 mL/Hr) IV Continuous <Continuous>    MEDICATIONS  (PRN):      I&O's Summary    08 Dec 2023 07:01  -  09 Dec 2023 07:00  --------------------------------------------------------  IN: 1200 mL / OUT: 0 mL / NET: 1200 mL        PHYSICAL EXAM:  Vital Signs Last 24 Hrs  T(C): 36.5 (09 Dec 2023 16:58), Max: 36.6 (09 Dec 2023 05:11)  T(F): 97.7 (09 Dec 2023 16:58), Max: 97.9 (09 Dec 2023 05:11)  HR: 70 (09 Dec 2023 16:58) (62 - 70)  BP: 134/71 (09 Dec 2023 16:58) (132/69 - 155/67)  BP(mean): 92 (09 Dec 2023 16:58) (92 - 92)  RR: 18 (09 Dec 2023 16:58) (18 - 18)  SpO2: 94% (09 Dec 2023 16:58) (94% - 96%)    Parameters below as of 09 Dec 2023 16:58  Patient On (Oxygen Delivery Method): room air          GENERAL: not in acute distress  HEENT:  Clear conjunctiva, PERRL, moist oral mucosa  RESP:  Non-labored breathing pattern, lungs clear to ausculation, no wheezes or crackles appreciated  CV: Regular rate and rhythm, no murmurs appreciated, no lower extremity edema, peripheral pulses are 2+ bilaterally  GI: Soft, non-tender, non-distended  NEURO: Awake, alert, conversant, upper and lower extremity strength 5/5 except when trying to lift arms above shoulder level, unable to keep arms elevated when placed above that height, light touch sensation grossly intact  PSYCH: Calm, cooperative, A&Ox3  SKIN: No rash or lesions, warm and dry      LABS:                        11.0   15.34 )-----------( 379      ( 08 Dec 2023 06:21 )             31.9     12-09    140  |  106  |  14.2  ----------------------------<  117<H>  4.3   |  23.0  |  0.49<L>    Ca    8.3<L>      09 Dec 2023 08:16    TPro  5.1<L>  /  Alb  2.6<L>  /  TBili  0.4  /  DBili  x   /  AST  420<H>  /  ALT  482<H>  /  AlkPhos  108  12-08      CARDIAC MARKERS ( 08 Dec 2023 06:21 )  x     / x     / 6619 U/L / x     / 153.5 ng/mL      Urinalysis Basic - ( 09 Dec 2023 08:16 )    Color: x / Appearance: x / SG: x / pH: x  Gluc: 117 mg/dL / Ketone: x  / Bili: x / Urobili: x   Blood: x / Protein: x / Nitrite: x   Leuk Esterase: x / RBC: x / WBC x   Sq Epi: x / Non Sq Epi: x / Bacteria: x        CAPILLARY BLOOD GLUCOSE          IMAGING:

## 2023-12-10 LAB
ALBUMIN SERPL ELPH-MCNC: 2.6 G/DL — LOW (ref 3.3–5.2)
ALBUMIN SERPL ELPH-MCNC: 2.6 G/DL — LOW (ref 3.3–5.2)
ALP SERPL-CCNC: 111 U/L — SIGNIFICANT CHANGE UP (ref 40–120)
ALP SERPL-CCNC: 111 U/L — SIGNIFICANT CHANGE UP (ref 40–120)
ALT FLD-CCNC: 563 U/L — HIGH
ALT FLD-CCNC: 563 U/L — HIGH
ANION GAP SERPL CALC-SCNC: 12 MMOL/L — SIGNIFICANT CHANGE UP (ref 5–17)
ANION GAP SERPL CALC-SCNC: 12 MMOL/L — SIGNIFICANT CHANGE UP (ref 5–17)
AST SERPL-CCNC: 433 U/L — HIGH
AST SERPL-CCNC: 433 U/L — HIGH
BILIRUB DIRECT SERPL-MCNC: 0.1 MG/DL — SIGNIFICANT CHANGE UP (ref 0–0.3)
BILIRUB DIRECT SERPL-MCNC: 0.1 MG/DL — SIGNIFICANT CHANGE UP (ref 0–0.3)
BILIRUB INDIRECT FLD-MCNC: 0.3 MG/DL — SIGNIFICANT CHANGE UP (ref 0.2–1)
BILIRUB INDIRECT FLD-MCNC: 0.3 MG/DL — SIGNIFICANT CHANGE UP (ref 0.2–1)
BILIRUB SERPL-MCNC: 0.4 MG/DL — SIGNIFICANT CHANGE UP (ref 0.4–2)
BILIRUB SERPL-MCNC: 0.4 MG/DL — SIGNIFICANT CHANGE UP (ref 0.4–2)
BUN SERPL-MCNC: 12.6 MG/DL — SIGNIFICANT CHANGE UP (ref 8–20)
BUN SERPL-MCNC: 12.6 MG/DL — SIGNIFICANT CHANGE UP (ref 8–20)
CALCIUM SERPL-MCNC: 8.4 MG/DL — SIGNIFICANT CHANGE UP (ref 8.4–10.5)
CALCIUM SERPL-MCNC: 8.4 MG/DL — SIGNIFICANT CHANGE UP (ref 8.4–10.5)
CHLORIDE SERPL-SCNC: 103 MMOL/L — SIGNIFICANT CHANGE UP (ref 96–108)
CHLORIDE SERPL-SCNC: 103 MMOL/L — SIGNIFICANT CHANGE UP (ref 96–108)
CK MB CFR SERPL CALC: 149.9 NG/ML — HIGH (ref 0–6.7)
CK MB CFR SERPL CALC: 149.9 NG/ML — HIGH (ref 0–6.7)
CK SERPL-CCNC: 5802 U/L — HIGH (ref 30–200)
CK SERPL-CCNC: 5802 U/L — HIGH (ref 30–200)
CO2 SERPL-SCNC: 24 MMOL/L — SIGNIFICANT CHANGE UP (ref 22–29)
CO2 SERPL-SCNC: 24 MMOL/L — SIGNIFICANT CHANGE UP (ref 22–29)
CREAT SERPL-MCNC: 0.53 MG/DL — SIGNIFICANT CHANGE UP (ref 0.5–1.3)
CREAT SERPL-MCNC: 0.53 MG/DL — SIGNIFICANT CHANGE UP (ref 0.5–1.3)
EGFR: 100 ML/MIN/1.73M2 — SIGNIFICANT CHANGE UP
EGFR: 100 ML/MIN/1.73M2 — SIGNIFICANT CHANGE UP
ERYTHROCYTE [SEDIMENTATION RATE] IN BLOOD: 13 MM/HR — SIGNIFICANT CHANGE UP (ref 0–15)
ERYTHROCYTE [SEDIMENTATION RATE] IN BLOOD: 13 MM/HR — SIGNIFICANT CHANGE UP (ref 0–15)
GLUCOSE SERPL-MCNC: 90 MG/DL — SIGNIFICANT CHANGE UP (ref 70–99)
GLUCOSE SERPL-MCNC: 90 MG/DL — SIGNIFICANT CHANGE UP (ref 70–99)
HCT VFR BLD CALC: 34.6 % — LOW (ref 39–50)
HCT VFR BLD CALC: 34.6 % — LOW (ref 39–50)
HGB BLD-MCNC: 11.7 G/DL — LOW (ref 13–17)
HGB BLD-MCNC: 11.7 G/DL — LOW (ref 13–17)
MCHC RBC-ENTMCNC: 31.7 PG — SIGNIFICANT CHANGE UP (ref 27–34)
MCHC RBC-ENTMCNC: 31.7 PG — SIGNIFICANT CHANGE UP (ref 27–34)
MCHC RBC-ENTMCNC: 33.8 GM/DL — SIGNIFICANT CHANGE UP (ref 32–36)
MCHC RBC-ENTMCNC: 33.8 GM/DL — SIGNIFICANT CHANGE UP (ref 32–36)
MCV RBC AUTO: 93.8 FL — SIGNIFICANT CHANGE UP (ref 80–100)
MCV RBC AUTO: 93.8 FL — SIGNIFICANT CHANGE UP (ref 80–100)
MYOGLOBIN SERPL-MCNC: 4600 NG/ML — HIGH (ref 28–72)
MYOGLOBIN SERPL-MCNC: 4600 NG/ML — HIGH (ref 28–72)
PLATELET # BLD AUTO: 444 K/UL — HIGH (ref 150–400)
PLATELET # BLD AUTO: 444 K/UL — HIGH (ref 150–400)
POTASSIUM SERPL-MCNC: 3.8 MMOL/L — SIGNIFICANT CHANGE UP (ref 3.5–5.3)
POTASSIUM SERPL-MCNC: 3.8 MMOL/L — SIGNIFICANT CHANGE UP (ref 3.5–5.3)
POTASSIUM SERPL-SCNC: 3.8 MMOL/L — SIGNIFICANT CHANGE UP (ref 3.5–5.3)
POTASSIUM SERPL-SCNC: 3.8 MMOL/L — SIGNIFICANT CHANGE UP (ref 3.5–5.3)
PROT SERPL-MCNC: 5.1 G/DL — LOW (ref 6.6–8.7)
PROT SERPL-MCNC: 5.1 G/DL — LOW (ref 6.6–8.7)
RBC # BLD: 3.69 M/UL — LOW (ref 4.2–5.8)
RBC # BLD: 3.69 M/UL — LOW (ref 4.2–5.8)
RBC # FLD: 14.2 % — SIGNIFICANT CHANGE UP (ref 10.3–14.5)
RBC # FLD: 14.2 % — SIGNIFICANT CHANGE UP (ref 10.3–14.5)
SODIUM SERPL-SCNC: 139 MMOL/L — SIGNIFICANT CHANGE UP (ref 135–145)
SODIUM SERPL-SCNC: 139 MMOL/L — SIGNIFICANT CHANGE UP (ref 135–145)
WBC # BLD: 16.61 K/UL — HIGH (ref 3.8–10.5)
WBC # BLD: 16.61 K/UL — HIGH (ref 3.8–10.5)
WBC # FLD AUTO: 16.61 K/UL — HIGH (ref 3.8–10.5)
WBC # FLD AUTO: 16.61 K/UL — HIGH (ref 3.8–10.5)

## 2023-12-10 PROCEDURE — 99233 SBSQ HOSP IP/OBS HIGH 50: CPT

## 2023-12-10 RX ORDER — SODIUM CHLORIDE 9 MG/ML
1000 INJECTION INTRAMUSCULAR; INTRAVENOUS; SUBCUTANEOUS
Refills: 0 | Status: DISCONTINUED | OUTPATIENT
Start: 2023-12-10 | End: 2023-12-10

## 2023-12-10 RX ORDER — SODIUM CHLORIDE 9 MG/ML
1000 INJECTION INTRAMUSCULAR; INTRAVENOUS; SUBCUTANEOUS
Refills: 0 | Status: DISCONTINUED | OUTPATIENT
Start: 2023-12-10 | End: 2023-12-12

## 2023-12-10 RX ADMIN — LISINOPRIL 5 MILLIGRAM(S): 2.5 TABLET ORAL at 06:33

## 2023-12-10 RX ADMIN — Medication 60 MILLIGRAM(S): at 06:34

## 2023-12-10 RX ADMIN — PANTOPRAZOLE SODIUM 40 MILLIGRAM(S): 20 TABLET, DELAYED RELEASE ORAL at 06:33

## 2023-12-10 RX ADMIN — Medication 1 MILLIGRAM(S): at 12:26

## 2023-12-10 RX ADMIN — PREGABALIN 1000 MICROGRAM(S): 225 CAPSULE ORAL at 12:26

## 2023-12-10 RX ADMIN — SODIUM CHLORIDE 75 MILLILITER(S): 9 INJECTION INTRAMUSCULAR; INTRAVENOUS; SUBCUTANEOUS at 16:13

## 2023-12-10 NOTE — DIETITIAN INITIAL EVALUATION ADULT - ORAL INTAKE PTA/DIET HISTORY
Pt reports having a good appetite/PO intake in house and PTA, denies following any dietary restrictions. Pt states having lost ~10-12lbs over the past 6 months, unsure of reasoning, denies change in PO intake/physical activity. Per documentation Pt completing ~50% of meals, agreeable to ONS in house, prefers chocolate. Discussed importance of adequate protein-energy intake to optimize nutrition status. Pt receptive and understanding, RD to remain available.

## 2023-12-10 NOTE — DIETITIAN INITIAL EVALUATION ADULT - ADD RECOMMEND
Add Ensure BID, prefers chocolate; Rx MVI daily, continue vit B12 and folic acid; Encourage HBV protein sources; Encourage adequate hydration

## 2023-12-10 NOTE — DIETITIAN INITIAL EVALUATION ADULT - NS FNS DIET ORDER
Diet, NPO after Midnight:      NPO Start Date: 10-Dec-2023,   NPO Start Time: 23:59 (12-10-23 @ 09:30)

## 2023-12-10 NOTE — PROGRESS NOTE ADULT - ASSESSMENT
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 Given recent code sepsis, WBC 17, and concern for developing PNA, will hold off on OR scheduling until pt is optimized from an infection standpoint. Patient cleared by medicine for surgery.     Plan:  - will trend fever curve, WBC  - f/u blood cultures  - monitor infectious symptoms  - Tentative plan for OR - speaking to attending  - Medicine cleared for Surgery  - general surgery will continue to follow  - Rest of care per primary team

## 2023-12-10 NOTE — DIETITIAN INITIAL EVALUATION ADULT - NS FNS ENTERAL CURRENT ORDER
Current diet order meets estimated nutrient requirements MEDICATIONS  (PRN):  LORazepam     Tablet 2 milliGRAM(s) Oral every 6 hours PRN Agitation  LORazepam   Injectable 2 milliGRAM(s) IntraMuscular once PRN severe agitation  magnesium hydroxide Suspension 30 milliLiter(s) Oral daily PRN Constipation   MEDICATIONS  (PRN):  LORazepam     Tablet 2 milliGRAM(s) Oral every 6 hours PRN Agitation  LORazepam   Injectable 2 milliGRAM(s) IntraMuscular once PRN severe agitation  magnesium hydroxide Suspension 30 milliLiter(s) Oral daily PRN Constipation   MEDICATIONS  (PRN):  LORazepam     Tablet 2 milliGRAM(s) Oral every 6 hours PRN Agitation  LORazepam   Injectable 2 milliGRAM(s) IntraMuscular once PRN severe agitation  magnesium hydroxide Suspension 30 milliLiter(s) Oral daily PRN Constipation   MEDICATIONS  (PRN):  LORazepam     Tablet 2 milliGRAM(s) Oral every 6 hours PRN Agitation  LORazepam   Injectable 2 milliGRAM(s) IntraMuscular once PRN severe agitation  magnesium hydroxide Suspension 30 milliLiter(s) Oral daily PRN Constipation   MEDICATIONS  (PRN):  LORazepam     Tablet 2 milliGRAM(s) Oral every 6 hours PRN Agitation  LORazepam   Injectable 2 milliGRAM(s) IntraMuscular once PRN severe agitation  magnesium hydroxide Suspension 30 milliLiter(s) Oral daily PRN Constipation

## 2023-12-10 NOTE — PROGRESS NOTE ADULT - ASSESSMENT
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   -surgery on board for muscle biopsy on monday  -cont po prednisone per rheum recs  -no statins    # Fever with mild diarrhea - now resolved   stool + noravirus   persistent leukocytosis, more likely related to myositis  blood cx so far NGTD  remains afebrile   trend wbc     # elevated liver enzymes  - likely related to myositis vs drug induced from prior statin use  - no RUQ tenderness, bilirubin wnl  - trend LFTs    # Rhabdomyolysis   nephrology follow up appreciated   c/w IV fluids  cr stable   trend CPK, LFTs and myoglobulin  cont to hold lipitor     # HTN   cont lisinopril 5 mg  daily     DVT prophylaxis IPCDs    DC home likely after muscle biopsy   will d/w rheumotology on DC plan for steroid use and dosage taper        84 yo M with HTN , hyperlipidemia presented with shoulder arm pain , weakness of arms and lower leg high CPK       # muscle weakness, suspected myositis vs statin induced  -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   -surgery on board for muscle biopsy on monday  -cont po prednisone per rheum recs  -no statins    # Fever with mild diarrhea - now resolved   stool + noravirus   persistent leukocytosis, more likely related to myositis  blood cx so far NGTD  remains afebrile   trend wbc     # elevated liver enzymes  - likely related to myositis vs drug induced from prior statin use  - no RUQ tenderness, bilirubin wnl  - trend LFTs    # Rhabdomyolysis   nephrology follow up appreciated   c/w IV fluids  cr stable   trend CPK, LFTs and myoglobulin  cont to hold lipitor     # HTN   cont lisinopril 5 mg  daily     DVT prophylaxis IPCDs    DC home likely after muscle biopsy   will d/w rheumotology on DC plan for steroid use and dosage taper

## 2023-12-10 NOTE — DIETITIAN INITIAL EVALUATION ADULT - PERTINENT LABORATORY DATA
12-10    139  |  103  |  12.6  ----------------------------<  90  3.8   |  24.0  |  0.53    Ca    8.4      10 Dec 2023 06:27    TPro  5.1<L>  /  Alb  2.6<L>  /  TBili  0.4  /  DBili  0.1  /  AST  433<H>  /  ALT  563<H>  /  AlkPhos  111  12-10

## 2023-12-10 NOTE — DIETITIAN INITIAL EVALUATION ADULT - OTHER INFO
83-year-old male with PMHx of HTN , hyperlipidemia presented to the ED with complaining of muscle weakness. He was in the ED for same nov 28 signed AMA   He states that started about 1 month ago with weakness in right shoulder some pain and unable to lift his shoulder, then was feeling left shoulder now he can not raise his arm over to shoulder. He also admits now having weakness in legs right worse then left leg thigh area and feels like stiff, can not lift sitting in the chair. + dry mouth Patient wastaking Lipitor, states that he stopped taking 1 week ago per PCP. Patient states that he was also prescribed a medication recently for his liver, but is unsure what it was. Plan for muscle biopsy (12/11).

## 2023-12-10 NOTE — DIETITIAN NUTRITION RISK NOTIFICATION - ADDITIONAL COMMENTS/DIETITIAN RECOMMENDATIONS
1) Add Ensure BID, prefers chocolate  2) Rx MVI daily, continue vit B12 and folic acid  3) Encourage HBV protein sources  4) Encourage adequate hydration  5) Monitor weights daily for trend/accuracy

## 2023-12-10 NOTE — PROGRESS NOTE ADULT - SUBJECTIVE AND OBJECTIVE BOX
Subjective: Patient resting in bed this morning. No acute issues overnight. Denies chest pain or shortness of breath. Reports some proximal weakness.    MEDICATIONS  (STANDING):  cyanocobalamin 1000 MICROGram(s) Oral daily  folic acid 1 milliGRAM(s) Oral daily  lisinopril 5 milliGRAM(s) Oral daily  pantoprazole    Tablet 40 milliGRAM(s) Oral before breakfast  predniSONE   Tablet 60 milliGRAM(s) Oral daily  sodium chloride 0.9%. 1000 milliLiter(s) (82 mL/Hr) IV Continuous <Continuous>    MEDICATIONS  (PRN):      Vital Signs Last 24 Hrs  T(C): 36.6 (09 Dec 2023 20:49), Max: 36.6 (09 Dec 2023 05:11)  T(F): 97.9 (09 Dec 2023 20:49), Max: 97.9 (09 Dec 2023 05:11)  HR: 62 (09 Dec 2023 20:49) (62 - 70)  BP: 136/70 (09 Dec 2023 20:49) (132/69 - 154/81)  BP(mean): 92 (09 Dec 2023 16:58) (92 - 92)  RR: 18 (09 Dec 2023 20:49) (18 - 18)  SpO2: 94% (09 Dec 2023 20:49) (94% - 96%)    Parameters below as of 09 Dec 2023 20:49  Patient On (Oxygen Delivery Method): room air        Physical Exam:  Gen: resting in bed comfortably in NAD  Chest: no increased WOB, regular inspiratory effort   Abdomen: Soft, nontender, nondistended with no rebound tenderness or guarding.   Vascular: WWP, DIAZ x4  NEURO: awake, alert      LABS:                        11.0   15.34 )-----------( 379      ( 08 Dec 2023 06:21 )             31.9     12-09    140  |  106  |  14.2  ----------------------------<  117<H>  4.3   |  23.0  |  0.49<L>    Ca    8.3<L>      09 Dec 2023 08:16    TPro  5.1<L>  /  Alb  2.6<L>  /  TBili  0.4  /  DBili  x   /  AST  420<H>  /  ALT  482<H>  /  AlkPhos  108  12-08      Urinalysis Basic - ( 09 Dec 2023 08:16 )    Color: x / Appearance: x / SG: x / pH: x  Gluc: 117 mg/dL / Ketone: x  / Bili: x / Urobili: x   Blood: x / Protein: x / Nitrite: x   Leuk Esterase: x / RBC: x / WBC x   Sq Epi: x / Non Sq Epi: x / Bacteria: x        A:     Plan:   -

## 2023-12-10 NOTE — PROGRESS NOTE ADULT - SUBJECTIVE AND OBJECTIVE BOX
[facility] Division of Medicine    SUBJECTIVE / OVERNIGHT EVENTS: Pt seen at the bedside. Patient denies chest pain, SOB, abd pain, N/V, fever, chills, dysuria or any other complaints. All remainder ROS negative.     MEDICATIONS  (STANDING):  cyanocobalamin 1000 MICROGram(s) Oral daily  folic acid 1 milliGRAM(s) Oral daily  lisinopril 5 milliGRAM(s) Oral daily  pantoprazole    Tablet 40 milliGRAM(s) Oral before breakfast  predniSONE   Tablet 60 milliGRAM(s) Oral daily  sodium chloride 0.9%. 1000 milliLiter(s) (82 mL/Hr) IV Continuous <Continuous>    MEDICATIONS  (PRN):      I&O's Summary      PHYSICAL EXAM:  Vital Signs Last 24 Hrs  T(C): 36.7 (10 Dec 2023 08:18), Max: 36.7 (10 Dec 2023 05:01)  T(F): 98.1 (10 Dec 2023 08:18), Max: 98.1 (10 Dec 2023 05:01)  HR: 61 (10 Dec 2023 08:18) (58 - 70)  BP: 148/68 (10 Dec 2023 08:18) (134/71 - 159/67)  BP(mean): 92 (09 Dec 2023 16:58) (92 - 92)  RR: 18 (10 Dec 2023 08:18) (18 - 18)  SpO2: 94% (10 Dec 2023 08:18) (93% - 94%)    Parameters below as of 10 Dec 2023 08:18  Patient On (Oxygen Delivery Method): room air      GENERAL: not in acute distress  HEENT:  Clear conjunctiva, PERRL, moist oral mucosa  RESP:  Non-labored breathing pattern, lungs clear to ausculation, no wheezes or crackles appreciated  CV: Regular rate and rhythm, no murmurs appreciated, no lower extremity edema, peripheral pulses are 2+ bilaterally  GI: Soft, non-tender, non-distended  NEURO: Awake, alert, conversant, upper and lower extremity strength 5/5 except when trying to lift arms above shoulder level, unable to keep arms elevated when placed above that height, light touch sensation grossly intact  PSYCH: Calm, cooperative, A&Ox3  SKIN: No rash or lesions, warm and dry      LABS:                        11.7   16.61 )-----------( 444      ( 10 Dec 2023 06:27 )             34.6     12-10    139  |  103  |  12.6  ----------------------------<  90  3.8   |  24.0  |  0.53    Ca    8.4      10 Dec 2023 06:27    TPro  5.1<L>  /  Alb  2.6<L>  /  TBili  0.4  /  DBili  0.1  /  AST  433<H>  /  ALT  563<H>  /  AlkPhos  111  12-10      CARDIAC MARKERS ( 10 Dec 2023 06:27 )  x     / x     / 5802 U/L / x     / 149.9 ng/mL      Urinalysis Basic - ( 10 Dec 2023 06:27 )    Color: x / Appearance: x / SG: x / pH: x  Gluc: 90 mg/dL / Ketone: x  / Bili: x / Urobili: x   Blood: x / Protein: x / Nitrite: x   Leuk Esterase: x / RBC: x / WBC x   Sq Epi: x / Non Sq Epi: x / Bacteria: x        CAPILLARY BLOOD GLUCOSE          IMAGING:                                   [facility] Division of Medicine    SUBJECTIVE / OVERNIGHT EVENTS: Pt seen at the bedside. Patient denies chest pain, SOB, abd pain, N/V, fever, chills, dysuria or any other complaints. All remainder ROS negative.     MEDICATIONS  (STANDING):  cyanocobalamin 1000 MICROGram(s) Oral daily  folic acid 1 milliGRAM(s) Oral daily  lisinopril 5 milliGRAM(s) Oral daily  pantoprazole    Tablet 40 milliGRAM(s) Oral before breakfast  predniSONE   Tablet 60 milliGRAM(s) Oral daily  sodium chloride 0.9%. 1000 milliLiter(s) (82 mL/Hr) IV Continuous <Continuous>    MEDICATIONS  (PRN):      I&O's Summary      PHYSICAL EXAM:  Vital Signs Last 24 Hrs  T(C): 36.7 (10 Dec 2023 08:18), Max: 36.7 (10 Dec 2023 05:01)  T(F): 98.1 (10 Dec 2023 08:18), Max: 98.1 (10 Dec 2023 05:01)  HR: 61 (10 Dec 2023 08:18) (58 - 70)  BP: 148/68 (10 Dec 2023 08:18) (134/71 - 159/67)  BP(mean): 92 (09 Dec 2023 16:58) (92 - 92)  RR: 18 (10 Dec 2023 08:18) (18 - 18)  SpO2: 94% (10 Dec 2023 08:18) (93% - 94%)    Parameters below as of 10 Dec 2023 08:18  Patient On (Oxygen Delivery Method): room air      GENERAL: not in acute distress  HEENT:  Clear conjunctiva, PERRL, moist oral mucosa  RESP:  Non-labored breathing pattern, lungs clear to ausculation, no wheezes or crackles appreciated  CV: Regular rate and rhythm, no murmurs appreciated, no lower extremity edema, peripheral pulses are 2+ bilaterally  GI: Soft, non-tender, non-distended  NEURO: Awake, alert, conversant, upper and lower extremity strength 5/5 except when trying to lift arms above shoulder level, unable to keep arms elevated when placed above that height, light touch sensation grossly intact  PSYCH: Calm, cooperative, A&Ox3  SKIN: No rash or lesions, warm and dry      LABS:                        11.7   16.61 )-----------( 444      ( 10 Dec 2023 06:27 )             34.6     12-10    139  |  103  |  12.6  ----------------------------<  90  3.8   |  24.0  |  0.53    Ca    8.4      10 Dec 2023 06:27    TPro  5.1<L>  /  Alb  2.6<L>  /  TBili  0.4  /  DBili  0.1  /  AST  433<H>  /  ALT  563<H>  /  AlkPhos  111  12-10      CARDIAC MARKERS ( 10 Dec 2023 06:27 )  x     / x     / 5802 U/L / x     / 149.9 ng/mL      Urinalysis Basic - ( 10 Dec 2023 06:27 )    Color: x / Appearance: x / SG: x / pH: x  Gluc: 90 mg/dL / Ketone: x  / Bili: x / Urobili: x   Blood: x / Protein: x / Nitrite: x   Leuk Esterase: x / RBC: x / WBC x   Sq Epi: x / Non Sq Epi: x / Bacteria: x        CAPILLARY BLOOD GLUCOSE          IMAGING:

## 2023-12-10 NOTE — DIETITIAN INITIAL EVALUATION ADULT - ETIOLOGY
related to inability to meet sufficient protein-energy needs in setting of muscle weakness, advanced age

## 2023-12-10 NOTE — DIETITIAN INITIAL EVALUATION ADULT - PERTINENT MEDS FT
MEDICATIONS  (STANDING):  cyanocobalamin 1000 MICROGram(s) Oral daily  folic acid 1 milliGRAM(s) Oral daily  lisinopril 5 milliGRAM(s) Oral daily  pantoprazole    Tablet 40 milliGRAM(s) Oral before breakfast  predniSONE   Tablet 60 milliGRAM(s) Oral daily  sodium chloride 0.9%. 1000 milliLiter(s) (82 mL/Hr) IV Continuous <Continuous>    MEDICATIONS  (PRN):

## 2023-12-10 NOTE — DIETITIAN INITIAL EVALUATION ADULT - CONTINUE CURRENT NUTRITION CARE PLAN
yes Detail Level: Zone Plan: Left posterior shoulder: ED&C 10/19/20 followed by 6 weeks of [place]\\nLeft temple and right neck: treated with MOHS 2019 Plan: Both sites treated with Mohs in 2019

## 2023-12-11 ENCOUNTER — TRANSCRIPTION ENCOUNTER (OUTPATIENT)
Age: 83
End: 2023-12-11

## 2023-12-11 LAB
ALBUMIN SERPL ELPH-MCNC: 2.6 G/DL — LOW (ref 3.3–5.2)
ALBUMIN SERPL ELPH-MCNC: 2.6 G/DL — LOW (ref 3.3–5.2)
ALP SERPL-CCNC: 108 U/L — SIGNIFICANT CHANGE UP (ref 40–120)
ALP SERPL-CCNC: 108 U/L — SIGNIFICANT CHANGE UP (ref 40–120)
ALT FLD-CCNC: 549 U/L — HIGH
ALT FLD-CCNC: 549 U/L — HIGH
ANION GAP SERPL CALC-SCNC: 12 MMOL/L — SIGNIFICANT CHANGE UP (ref 5–17)
ANION GAP SERPL CALC-SCNC: 12 MMOL/L — SIGNIFICANT CHANGE UP (ref 5–17)
AST SERPL-CCNC: 386 U/L — HIGH
AST SERPL-CCNC: 386 U/L — HIGH
BILIRUB DIRECT SERPL-MCNC: 0.1 MG/DL — SIGNIFICANT CHANGE UP (ref 0–0.3)
BILIRUB DIRECT SERPL-MCNC: 0.1 MG/DL — SIGNIFICANT CHANGE UP (ref 0–0.3)
BILIRUB INDIRECT FLD-MCNC: 0.3 MG/DL — SIGNIFICANT CHANGE UP (ref 0.2–1)
BILIRUB INDIRECT FLD-MCNC: 0.3 MG/DL — SIGNIFICANT CHANGE UP (ref 0.2–1)
BILIRUB SERPL-MCNC: 0.4 MG/DL — SIGNIFICANT CHANGE UP (ref 0.4–2)
BILIRUB SERPL-MCNC: 0.4 MG/DL — SIGNIFICANT CHANGE UP (ref 0.4–2)
BLD GP AB SCN SERPL QL: SIGNIFICANT CHANGE UP
BLD GP AB SCN SERPL QL: SIGNIFICANT CHANGE UP
BUN SERPL-MCNC: 14 MG/DL — SIGNIFICANT CHANGE UP (ref 8–20)
BUN SERPL-MCNC: 14 MG/DL — SIGNIFICANT CHANGE UP (ref 8–20)
CALCIUM SERPL-MCNC: 8.4 MG/DL — SIGNIFICANT CHANGE UP (ref 8.4–10.5)
CALCIUM SERPL-MCNC: 8.4 MG/DL — SIGNIFICANT CHANGE UP (ref 8.4–10.5)
CHLORIDE SERPL-SCNC: 105 MMOL/L — SIGNIFICANT CHANGE UP (ref 96–108)
CHLORIDE SERPL-SCNC: 105 MMOL/L — SIGNIFICANT CHANGE UP (ref 96–108)
CK MB CFR SERPL CALC: 135.4 NG/ML — HIGH (ref 0–6.7)
CK MB CFR SERPL CALC: 135.4 NG/ML — HIGH (ref 0–6.7)
CK SERPL-CCNC: 5102 U/L — HIGH (ref 30–200)
CK SERPL-CCNC: 5102 U/L — HIGH (ref 30–200)
CO2 SERPL-SCNC: 25 MMOL/L — SIGNIFICANT CHANGE UP (ref 22–29)
CO2 SERPL-SCNC: 25 MMOL/L — SIGNIFICANT CHANGE UP (ref 22–29)
CREAT SERPL-MCNC: 0.48 MG/DL — LOW (ref 0.5–1.3)
CREAT SERPL-MCNC: 0.48 MG/DL — LOW (ref 0.5–1.3)
CULTURE RESULTS: SIGNIFICANT CHANGE UP
EGFR: 103 ML/MIN/1.73M2 — SIGNIFICANT CHANGE UP
EGFR: 103 ML/MIN/1.73M2 — SIGNIFICANT CHANGE UP
GLUCOSE SERPL-MCNC: 95 MG/DL — SIGNIFICANT CHANGE UP (ref 70–99)
GLUCOSE SERPL-MCNC: 95 MG/DL — SIGNIFICANT CHANGE UP (ref 70–99)
HCT VFR BLD CALC: 34.1 % — LOW (ref 39–50)
HCT VFR BLD CALC: 34.1 % — LOW (ref 39–50)
HGB BLD-MCNC: 11.8 G/DL — LOW (ref 13–17)
HGB BLD-MCNC: 11.8 G/DL — LOW (ref 13–17)
HMGCR ANTIBODY, IGG RESULT: 84 UNITS — HIGH (ref 0–19)
HMGCR ANTIBODY, IGG RESULT: 84 UNITS — HIGH (ref 0–19)
INR BLD: 0.99 RATIO — SIGNIFICANT CHANGE UP (ref 0.85–1.18)
INR BLD: 0.99 RATIO — SIGNIFICANT CHANGE UP (ref 0.85–1.18)
MCHC RBC-ENTMCNC: 32.7 PG — SIGNIFICANT CHANGE UP (ref 27–34)
MCHC RBC-ENTMCNC: 32.7 PG — SIGNIFICANT CHANGE UP (ref 27–34)
MCHC RBC-ENTMCNC: 34.6 GM/DL — SIGNIFICANT CHANGE UP (ref 32–36)
MCHC RBC-ENTMCNC: 34.6 GM/DL — SIGNIFICANT CHANGE UP (ref 32–36)
MCV RBC AUTO: 94.5 FL — SIGNIFICANT CHANGE UP (ref 80–100)
MCV RBC AUTO: 94.5 FL — SIGNIFICANT CHANGE UP (ref 80–100)
MYOGLOBIN SERPL-MCNC: 4859 NG/ML — HIGH (ref 28–72)
MYOGLOBIN SERPL-MCNC: 4859 NG/ML — HIGH (ref 28–72)
PLATELET # BLD AUTO: 463 K/UL — HIGH (ref 150–400)
PLATELET # BLD AUTO: 463 K/UL — HIGH (ref 150–400)
PM/SCL-100 AB SER LINE BLOT-ACNC: <20 UNITS — SIGNIFICANT CHANGE UP
PM/SCL-100 AB SER LINE BLOT-ACNC: <20 UNITS — SIGNIFICANT CHANGE UP
POTASSIUM SERPL-MCNC: 3.9 MMOL/L — SIGNIFICANT CHANGE UP (ref 3.5–5.3)
POTASSIUM SERPL-MCNC: 3.9 MMOL/L — SIGNIFICANT CHANGE UP (ref 3.5–5.3)
POTASSIUM SERPL-SCNC: 3.9 MMOL/L — SIGNIFICANT CHANGE UP (ref 3.5–5.3)
POTASSIUM SERPL-SCNC: 3.9 MMOL/L — SIGNIFICANT CHANGE UP (ref 3.5–5.3)
PROT SERPL-MCNC: 5.2 G/DL — LOW (ref 6.6–8.7)
PROT SERPL-MCNC: 5.2 G/DL — LOW (ref 6.6–8.7)
PROTHROM AB SERPL-ACNC: 11 SEC — SIGNIFICANT CHANGE UP (ref 9.5–13)
PROTHROM AB SERPL-ACNC: 11 SEC — SIGNIFICANT CHANGE UP (ref 9.5–13)
RBC # BLD: 3.61 M/UL — LOW (ref 4.2–5.8)
RBC # BLD: 3.61 M/UL — LOW (ref 4.2–5.8)
RBC # FLD: 14.5 % — SIGNIFICANT CHANGE UP (ref 10.3–14.5)
RBC # FLD: 14.5 % — SIGNIFICANT CHANGE UP (ref 10.3–14.5)
SODIUM SERPL-SCNC: 141 MMOL/L — SIGNIFICANT CHANGE UP (ref 135–145)
SODIUM SERPL-SCNC: 141 MMOL/L — SIGNIFICANT CHANGE UP (ref 135–145)
SPECIMEN SOURCE: SIGNIFICANT CHANGE UP
WBC # BLD: 17.3 K/UL — HIGH (ref 3.8–10.5)
WBC # BLD: 17.3 K/UL — HIGH (ref 3.8–10.5)
WBC # FLD AUTO: 17.3 K/UL — HIGH (ref 3.8–10.5)
WBC # FLD AUTO: 17.3 K/UL — HIGH (ref 3.8–10.5)

## 2023-12-11 PROCEDURE — 76705 ECHO EXAM OF ABDOMEN: CPT | Mod: 26

## 2023-12-11 PROCEDURE — 99232 SBSQ HOSP IP/OBS MODERATE 35: CPT

## 2023-12-11 RX ADMIN — Medication 60 MILLIGRAM(S): at 05:09

## 2023-12-11 RX ADMIN — Medication 1 MILLIGRAM(S): at 12:05

## 2023-12-11 RX ADMIN — PANTOPRAZOLE SODIUM 40 MILLIGRAM(S): 20 TABLET, DELAYED RELEASE ORAL at 05:09

## 2023-12-11 RX ADMIN — PREGABALIN 1000 MICROGRAM(S): 225 CAPSULE ORAL at 12:05

## 2023-12-11 RX ADMIN — LISINOPRIL 5 MILLIGRAM(S): 2.5 TABLET ORAL at 05:09

## 2023-12-11 RX ADMIN — SODIUM CHLORIDE 75 MILLILITER(S): 9 INJECTION INTRAMUSCULAR; INTRAVENOUS; SUBCUTANEOUS at 06:13

## 2023-12-11 NOTE — PROGRESS NOTE ADULT - ASSESSMENT
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 Given recent code sepsis, WBC 17, and concern for developing PNA, he was helf off on OR scheduling until pt was optimized from an infection standpoint. Patient cleared by medicine for surgery.     Plan:  - will trend fever curve, WBC  - f/u blood cultures  - monitor infectious symptoms  - Tentative plan for OR for muscle biopsy - speaking to attending regarding timing  - Medicine cleared for Surgery  - general surgery will continue to follow  - Rest of care per primary team A: 82yo M w/ hx of HTN and HL presents with progressive muscle weakness and labs/imaging findings concerning for polymyositis. Given recent code sepsis, WBC 17, and concern for developing PNA, he was pending for OR scheduling until pt was optimized from an infection standpoint. Patient cleared by medicine for surgery.     Plan:  - will trend fever curve, WBC  - f/u blood cultures  - monitor infectious symptoms  - Tentative plan for OR for muscle biopsy - booked for OR tomorrow Tuesday 12/12 - make NPO @ midnight, preop accordingly   - Medicine cleared for Surgery  - general surgery will continue to follow  - Rest of care per primary team A: 84yo M w/ hx of HTN and HL presents with progressive muscle weakness and labs/imaging findings concerning for polymyositis. Given recent code sepsis, WBC 17, and concern for developing PNA, he was pending for OR scheduling until pt was optimized from an infection standpoint. Patient cleared by medicine for surgery.     Plan:  - will trend fever curve, WBC  - f/u blood cultures  - monitor infectious symptoms  - Tentative plan for OR for muscle biopsy - booked for OR tomorrow Tuesday 12/12 - make NPO @ midnight, preop accordingly   - Medicine cleared for Surgery  - general surgery will continue to follow  - Rest of care per primary team

## 2023-12-11 NOTE — PHYSICAL THERAPY INITIAL EVALUATION ADULT - PERTINENT HX OF CURRENT PROBLEM, REHAB EVAL
84 yo M with HTN , hyperlipidemia presented with shoulder arm pain , weakness of arms and lower leg high CPK

## 2023-12-11 NOTE — PROGRESS NOTE ADULT - SUBJECTIVE AND OBJECTIVE BOX
Subjective: Patient resting in bed without issue. Reports minimal proximal weakness. No acute events overnight noted.       MEDICATIONS  (STANDING):  cyanocobalamin 1000 MICROGram(s) Oral daily  folic acid 1 milliGRAM(s) Oral daily  lisinopril 5 milliGRAM(s) Oral daily  pantoprazole    Tablet 40 milliGRAM(s) Oral before breakfast  predniSONE   Tablet 60 milliGRAM(s) Oral daily  sodium chloride 0.9%. 1000 milliLiter(s) (75 mL/Hr) IV Continuous <Continuous>    MEDICATIONS  (PRN):      Vital Signs Last 24 Hrs  T(C): 36.7 (11 Dec 2023 04:39), Max: 36.9 (10 Dec 2023 16:23)  T(F): 98.1 (11 Dec 2023 04:39), Max: 98.5 (10 Dec 2023 16:23)  HR: 63 (11 Dec 2023 04:39) (61 - 66)  BP: 155/76 (11 Dec 2023 04:39) (143/71 - 155/76)  BP(mean): --  RR: 18 (11 Dec 2023 04:39) (18 - 18)  SpO2: 94% (11 Dec 2023 04:39) (93% - 95%)    Parameters below as of 11 Dec 2023 04:39  Patient On (Oxygen Delivery Method): room air        Physical Exam:    Constitutional: NAD  Respiratory: Respirations non-labored  Gastrointestinal: Soft, non-tender, non-distended  Extremities: No peripheral edema, No cyanosis        LABS:                        11.8   17.30 )-----------( 463      ( 11 Dec 2023 06:42 )             34.1     12-11    141  |  105  |  14.0  ----------------------------<  95  3.9   |  25.0  |  0.48<L>    Ca    8.4      11 Dec 2023 06:42    TPro  5.2<L>  /  Alb  2.6<L>  /  TBili  0.4  /  DBili  0.1  /  AST  386<H>  /  ALT  549<H>  /  AlkPhos  108  12-11    PT/INR - ( 11 Dec 2023 06:42 )   PT: 11.0 sec;   INR: 0.99 ratio           Urinalysis Basic - ( 11 Dec 2023 06:42 )    Color: x / Appearance: x / SG: x / pH: x  Gluc: 95 mg/dL / Ketone: x  / Bili: x / Urobili: x   Blood: x / Protein: x / Nitrite: x   Leuk Esterase: x / RBC: x / WBC x   Sq Epi: x / Non Sq Epi: x / Bacteria: x        A:     Plan:   -

## 2023-12-11 NOTE — PROGRESS NOTE ADULT - SUBJECTIVE AND OBJECTIVE BOX
[facility] Division of Medicine    SUBJECTIVE / OVERNIGHT EVENTS: Pt seen at the bedside. Only complaint is frustration with surgery service as bx that was planned for today was postponed for unknown reason.    MEDICATIONS  (STANDING):  cyanocobalamin 1000 MICROGram(s) Oral daily  folic acid 1 milliGRAM(s) Oral daily  lisinopril 5 milliGRAM(s) Oral daily  pantoprazole    Tablet 40 milliGRAM(s) Oral before breakfast  predniSONE   Tablet 60 milliGRAM(s) Oral daily  sodium chloride 0.9%. 1000 milliLiter(s) (75 mL/Hr) IV Continuous <Continuous>    MEDICATIONS  (PRN):      I&O's Summary      PHYSICAL EXAM:  Vital Signs Last 24 Hrs  T(C): 36.6 (11 Dec 2023 08:58), Max: 36.9 (10 Dec 2023 16:23)  T(F): 97.8 (11 Dec 2023 08:58), Max: 98.5 (10 Dec 2023 16:23)  HR: 61 (11 Dec 2023 08:58) (61 - 66)  BP: 155/74 (11 Dec 2023 08:58) (143/71 - 155/76)  BP(mean): --  RR: 18 (11 Dec 2023 08:58) (18 - 18)  SpO2: 94% (11 Dec 2023 08:58) (93% - 95%)    Parameters below as of 11 Dec 2023 08:58  Patient On (Oxygen Delivery Method): room air      GENERAL: not in acute distress  HEENT:  Clear conjunctiva, PERRL,  EOMI, moist oral mucosa no pharyngeal injection or exudates, no cervical LAD  RESP:  Non-labored breathing pattern, lungs clear to ausculation, no wheezes or crackles appreciated  CV: Regular rate and rhythm, no murmurs appreciated, no lower extremity edema, peripheral pulses are 2+ bilaterally  GI: Soft, non-tender, non-distended  NEURO: Awake, alert, conversant, upper and lower extremity strength 5/5, light touch sensation grossly intact  PSYCH: Calm, cooperative, A&Ox3  SKIN: No rash or lesions, warm and dry      LABS:                        11.8   17.30 )-----------( 463      ( 11 Dec 2023 06:42 )             34.1     12-11    141  |  105  |  14.0  ----------------------------<  95  3.9   |  25.0  |  0.48<L>    Ca    8.4      11 Dec 2023 06:42    TPro  5.2<L>  /  Alb  2.6<L>  /  TBili  0.4  /  DBili  0.1  /  AST  386<H>  /  ALT  549<H>  /  AlkPhos  108  12-11    PT/INR - ( 11 Dec 2023 06:42 )   PT: 11.0 sec;   INR: 0.99 ratio           CARDIAC MARKERS ( 11 Dec 2023 06:42 )  x     / x     / 5102 U/L / x     / 135.4 ng/mL  CARDIAC MARKERS ( 10 Dec 2023 06:27 )  x     / x     / 5802 U/L / x     / 149.9 ng/mL      Urinalysis Basic - ( 11 Dec 2023 06:42 )    Color: x / Appearance: x / SG: x / pH: x  Gluc: 95 mg/dL / Ketone: x  / Bili: x / Urobili: x   Blood: x / Protein: x / Nitrite: x   Leuk Esterase: x / RBC: x / WBC x   Sq Epi: x / Non Sq Epi: x / Bacteria: x        CAPILLARY BLOOD GLUCOSE          IMAGING:                                   [facility] Division of Medicine    SUBJECTIVE / OVERNIGHT EVENTS: Pt seen at the bedside. Only complaint is frustration with surgery service as bx that was planned for today was postponed for unknown reason.    MEDICATIONS  (STANDING):  cyanocobalamin 1000 MICROGram(s) Oral daily  folic acid 1 milliGRAM(s) Oral daily  lisinopril 5 milliGRAM(s) Oral daily  pantoprazole    Tablet 40 milliGRAM(s) Oral before breakfast  predniSONE   Tablet 60 milliGRAM(s) Oral daily  sodium chloride 0.9%. 1000 milliLiter(s) (75 mL/Hr) IV Continuous <Continuous>    MEDICATIONS  (PRN):      I&O's Summary      PHYSICAL EXAM:  Vital Signs Last 24 Hrs  T(C): 36.6 (11 Dec 2023 08:58), Max: 36.9 (10 Dec 2023 16:23)  T(F): 97.8 (11 Dec 2023 08:58), Max: 98.5 (10 Dec 2023 16:23)  HR: 61 (11 Dec 2023 08:58) (61 - 66)  BP: 155/74 (11 Dec 2023 08:58) (143/71 - 155/76)  BP(mean): --  RR: 18 (11 Dec 2023 08:58) (18 - 18)  SpO2: 94% (11 Dec 2023 08:58) (93% - 95%)    Parameters below as of 11 Dec 2023 08:58  Patient On (Oxygen Delivery Method): room air      GENERAL: not in acute distress  HEENT:  Clear conjunctiva, PERRL,  EOMI, moist oral mucosa no pharyngeal injection or exudates, no cervical LAD  RESP:  Non-labored breathing pattern, lungs clear to ausculation, no wheezes or crackles appreciated  CV: Regular rate and rhythm, no murmurs appreciated, no lower extremity edema, peripheral pulses are 2+ bilaterally  GI: Soft, non-tender, non-distended  NEURO: Awake, alert, conversant, upper and lower extremity strength 5/5, light touch sensation grossly intact  PSYCH: Calm, cooperative, A&Ox3  SKIN: No rash or lesions, warm and dry      LABS:                        11.8   17.30 )-----------( 463      ( 11 Dec 2023 06:42 )             34.1     12-11    141  |  105  |  14.0  ----------------------------<  95  3.9   |  25.0  |  0.48<L>    Ca    8.4      11 Dec 2023 06:42    TPro  5.2<L>  /  Alb  2.6<L>  /  TBili  0.4  /  DBili  0.1  /  AST  386<H>  /  ALT  549<H>  /  AlkPhos  108  12-11    PT/INR - ( 11 Dec 2023 06:42 )   PT: 11.0 sec;   INR: 0.99 ratio           CARDIAC MARKERS ( 11 Dec 2023 06:42 )  x     / x     / 5102 U/L / x     / 135.4 ng/mL  CARDIAC MARKERS ( 10 Dec 2023 06:27 )  x     / x     / 5802 U/L / x     / 149.9 ng/mL      Urinalysis Basic - ( 11 Dec 2023 06:42 )    Color: x / Appearance: x / SG: x / pH: x  Gluc: 95 mg/dL / Ketone: x  / Bili: x / Urobili: x   Blood: x / Protein: x / Nitrite: x   Leuk Esterase: x / RBC: x / WBC x   Sq Epi: x / Non Sq Epi: x / Bacteria: x        CAPILLARY BLOOD GLUCOSE          IMAGING:

## 2023-12-11 NOTE — PHYSICAL THERAPY INITIAL EVALUATION ADULT - ADDITIONAL COMMENTS
Pt. lives in a house with his wife with no stairs to enter and no stairs inside. Pt. reports he was independent PTA and does not own DME. Pt. lives in a house with his wife with 3 stairs to enter with rail and no stairs inside. Pt. reports he was independent PTA and does not own DME.

## 2023-12-11 NOTE — PRE-ANESTHESIA EVALUATION ADULT - NSANTHADDINFOFT_GEN_ALL_CORE
82 yo M with HTN , hyperlipidemia present for Right UE muscle biopsy with a complaint of bilateral shoulder weakness/pain with suspected myositis vs statin induced with hospital course complicated by rhabdomyolysis.    -NPO midnight  -2 prbc on hold for OR   -No further tests indicated at this time 84 yo M with HTN , hyperlipidemia present for Right UE muscle biopsy with a complaint of bilateral shoulder weakness/pain with suspected myositis vs statin induced with hospital course complicated by rhabdomyolysis.    -NPO midnight  -2 prbc on hold for OR   -No further tests indicated at this time

## 2023-12-12 ENCOUNTER — TRANSCRIPTION ENCOUNTER (OUTPATIENT)
Age: 83
End: 2023-12-12

## 2023-12-12 VITALS
DIASTOLIC BLOOD PRESSURE: 63 MMHG | OXYGEN SATURATION: 97 % | SYSTOLIC BLOOD PRESSURE: 138 MMHG | RESPIRATION RATE: 15 BRPM | HEART RATE: 60 BPM

## 2023-12-12 LAB
ABO RH CONFIRMATION: SIGNIFICANT CHANGE UP
ABO RH CONFIRMATION: SIGNIFICANT CHANGE UP
ALBUMIN SERPL ELPH-MCNC: 2.7 G/DL — LOW (ref 3.3–5.2)
ALBUMIN SERPL ELPH-MCNC: 2.7 G/DL — LOW (ref 3.3–5.2)
ALP SERPL-CCNC: 106 U/L — SIGNIFICANT CHANGE UP (ref 40–120)
ALP SERPL-CCNC: 106 U/L — SIGNIFICANT CHANGE UP (ref 40–120)
ALT FLD-CCNC: 523 U/L — HIGH
ALT FLD-CCNC: 523 U/L — HIGH
AST SERPL-CCNC: 401 U/L — HIGH
AST SERPL-CCNC: 401 U/L — HIGH
BILIRUB DIRECT SERPL-MCNC: 0.1 MG/DL — SIGNIFICANT CHANGE UP (ref 0–0.3)
BILIRUB DIRECT SERPL-MCNC: 0.1 MG/DL — SIGNIFICANT CHANGE UP (ref 0–0.3)
BILIRUB INDIRECT FLD-MCNC: 0.3 MG/DL — SIGNIFICANT CHANGE UP (ref 0.2–1)
BILIRUB INDIRECT FLD-MCNC: 0.3 MG/DL — SIGNIFICANT CHANGE UP (ref 0.2–1)
BILIRUB SERPL-MCNC: 0.4 MG/DL — SIGNIFICANT CHANGE UP (ref 0.4–2)
BILIRUB SERPL-MCNC: 0.4 MG/DL — SIGNIFICANT CHANGE UP (ref 0.4–2)
HCT VFR BLD CALC: 32.8 % — LOW (ref 39–50)
HCT VFR BLD CALC: 32.8 % — LOW (ref 39–50)
HGB BLD-MCNC: 10.9 G/DL — LOW (ref 13–17)
HGB BLD-MCNC: 10.9 G/DL — LOW (ref 13–17)
MCHC RBC-ENTMCNC: 31.6 PG — SIGNIFICANT CHANGE UP (ref 27–34)
MCHC RBC-ENTMCNC: 31.6 PG — SIGNIFICANT CHANGE UP (ref 27–34)
MCHC RBC-ENTMCNC: 33.2 GM/DL — SIGNIFICANT CHANGE UP (ref 32–36)
MCHC RBC-ENTMCNC: 33.2 GM/DL — SIGNIFICANT CHANGE UP (ref 32–36)
MCV RBC AUTO: 95.1 FL — SIGNIFICANT CHANGE UP (ref 80–100)
MCV RBC AUTO: 95.1 FL — SIGNIFICANT CHANGE UP (ref 80–100)
PLATELET # BLD AUTO: 470 K/UL — HIGH (ref 150–400)
PLATELET # BLD AUTO: 470 K/UL — HIGH (ref 150–400)
PROT SERPL-MCNC: 5 G/DL — LOW (ref 6.6–8.7)
PROT SERPL-MCNC: 5 G/DL — LOW (ref 6.6–8.7)
RBC # BLD: 3.45 M/UL — LOW (ref 4.2–5.8)
RBC # BLD: 3.45 M/UL — LOW (ref 4.2–5.8)
RBC # FLD: 14.4 % — SIGNIFICANT CHANGE UP (ref 10.3–14.5)
RBC # FLD: 14.4 % — SIGNIFICANT CHANGE UP (ref 10.3–14.5)
WBC # BLD: 19.59 K/UL — HIGH (ref 3.8–10.5)
WBC # BLD: 19.59 K/UL — HIGH (ref 3.8–10.5)
WBC # FLD AUTO: 19.59 K/UL — HIGH (ref 3.8–10.5)
WBC # FLD AUTO: 19.59 K/UL — HIGH (ref 3.8–10.5)

## 2023-12-12 PROCEDURE — 93005 ELECTROCARDIOGRAM TRACING: CPT

## 2023-12-12 PROCEDURE — 84466 ASSAY OF TRANSFERRIN: CPT

## 2023-12-12 PROCEDURE — 83874 ASSAY OF MYOGLOBIN: CPT

## 2023-12-12 PROCEDURE — 88305 TISSUE EXAM BY PATHOLOGIST: CPT | Mod: 26

## 2023-12-12 PROCEDURE — 86480 TB TEST CELL IMMUN MEASURE: CPT

## 2023-12-12 PROCEDURE — 83516 IMMUNOASSAY NONANTIBODY: CPT

## 2023-12-12 PROCEDURE — 86235 NUCLEAR ANTIGEN ANTIBODY: CPT

## 2023-12-12 PROCEDURE — 74176 CT ABD & PELVIS W/O CONTRAST: CPT

## 2023-12-12 PROCEDURE — 81001 URINALYSIS AUTO W/SCOPE: CPT

## 2023-12-12 PROCEDURE — 96360 HYDRATION IV INFUSION INIT: CPT

## 2023-12-12 PROCEDURE — 86596 VOLTAGE-GTD CA CHNL ANTB EA: CPT

## 2023-12-12 PROCEDURE — 71045 X-RAY EXAM CHEST 1 VIEW: CPT

## 2023-12-12 PROCEDURE — 80076 HEPATIC FUNCTION PANEL: CPT

## 2023-12-12 PROCEDURE — 82310 ASSAY OF CALCIUM: CPT

## 2023-12-12 PROCEDURE — 84439 ASSAY OF FREE THYROXINE: CPT

## 2023-12-12 PROCEDURE — 87507 IADNA-DNA/RNA PROBE TQ 12-25: CPT

## 2023-12-12 PROCEDURE — 83540 ASSAY OF IRON: CPT

## 2023-12-12 PROCEDURE — 88305 TISSUE EXAM BY PATHOLOGIST: CPT

## 2023-12-12 PROCEDURE — 85610 PROTHROMBIN TIME: CPT

## 2023-12-12 PROCEDURE — 76705 ECHO EXAM OF ABDOMEN: CPT

## 2023-12-12 PROCEDURE — 86255 FLUORESCENT ANTIBODY SCREEN: CPT

## 2023-12-12 PROCEDURE — 82553 CREATINE MB FRACTION: CPT

## 2023-12-12 PROCEDURE — 83615 LACTATE (LD) (LDH) ENZYME: CPT

## 2023-12-12 PROCEDURE — 86706 HEP B SURFACE ANTIBODY: CPT

## 2023-12-12 PROCEDURE — 86900 BLOOD TYPING SEROLOGIC ABO: CPT

## 2023-12-12 PROCEDURE — 96361 HYDRATE IV INFUSION ADD-ON: CPT

## 2023-12-12 PROCEDURE — 84100 ASSAY OF PHOSPHORUS: CPT

## 2023-12-12 PROCEDURE — 82085 ASSAY OF ALDOLASE: CPT

## 2023-12-12 PROCEDURE — 86850 RBC ANTIBODY SCREEN: CPT

## 2023-12-12 PROCEDURE — 82306 VITAMIN D 25 HYDROXY: CPT

## 2023-12-12 PROCEDURE — 80048 BASIC METABOLIC PNL TOTAL CA: CPT

## 2023-12-12 PROCEDURE — 36415 COLL VENOUS BLD VENIPUNCTURE: CPT

## 2023-12-12 PROCEDURE — 83520 IMMUNOASSAY QUANT NOS NONAB: CPT

## 2023-12-12 PROCEDURE — 85025 COMPLETE CBC W/AUTO DIFF WBC: CPT

## 2023-12-12 PROCEDURE — 85652 RBC SED RATE AUTOMATED: CPT

## 2023-12-12 PROCEDURE — 86381 MITOCHONDRIAL ANTIBODY EACH: CPT

## 2023-12-12 PROCEDURE — 83970 ASSAY OF PARATHORMONE: CPT

## 2023-12-12 PROCEDURE — 99239 HOSP IP/OBS DSCHRG MGMT >30: CPT

## 2023-12-12 PROCEDURE — 71250 CT THORAX DX C-: CPT

## 2023-12-12 PROCEDURE — 84480 ASSAY TRIIODOTHYRONINE (T3): CPT

## 2023-12-12 PROCEDURE — 82607 VITAMIN B-12: CPT

## 2023-12-12 PROCEDURE — 83735 ASSAY OF MAGNESIUM: CPT

## 2023-12-12 PROCEDURE — 83880 ASSAY OF NATRIURETIC PEPTIDE: CPT

## 2023-12-12 PROCEDURE — 82550 ASSAY OF CK (CPK): CPT

## 2023-12-12 PROCEDURE — 83550 IRON BINDING TEST: CPT

## 2023-12-12 PROCEDURE — 86431 RHEUMATOID FACTOR QUANT: CPT

## 2023-12-12 PROCEDURE — 73219 MRI UPPER EXTREMITY W/DYE: CPT

## 2023-12-12 PROCEDURE — 82728 ASSAY OF FERRITIN: CPT

## 2023-12-12 PROCEDURE — 80074 ACUTE HEPATITIS PANEL: CPT

## 2023-12-12 PROCEDURE — 71046 X-RAY EXAM CHEST 2 VIEWS: CPT

## 2023-12-12 PROCEDURE — 82164 ANGIOTENSIN I ENZYME TEST: CPT

## 2023-12-12 PROCEDURE — 82533 TOTAL CORTISOL: CPT

## 2023-12-12 PROCEDURE — 85730 THROMBOPLASTIN TIME PARTIAL: CPT

## 2023-12-12 PROCEDURE — 82746 ASSAY OF FOLIC ACID SERUM: CPT

## 2023-12-12 PROCEDURE — 86038 ANTINUCLEAR ANTIBODIES: CPT

## 2023-12-12 PROCEDURE — 84436 ASSAY OF TOTAL THYROXINE: CPT

## 2023-12-12 PROCEDURE — 86140 C-REACTIVE PROTEIN: CPT

## 2023-12-12 PROCEDURE — 85027 COMPLETE CBC AUTOMATED: CPT

## 2023-12-12 PROCEDURE — 83605 ASSAY OF LACTIC ACID: CPT

## 2023-12-12 PROCEDURE — 84145 PROCALCITONIN (PCT): CPT

## 2023-12-12 PROCEDURE — 80053 COMPREHEN METABOLIC PANEL: CPT

## 2023-12-12 PROCEDURE — 86901 BLOOD TYPING SEROLOGIC RH(D): CPT

## 2023-12-12 PROCEDURE — 82248 BILIRUBIN DIRECT: CPT

## 2023-12-12 PROCEDURE — 84443 ASSAY THYROID STIM HORMONE: CPT

## 2023-12-12 PROCEDURE — 86200 CCP ANTIBODY: CPT

## 2023-12-12 PROCEDURE — 99285 EMERGENCY DEPT VISIT HI MDM: CPT

## 2023-12-12 PROCEDURE — 0225U NFCT DS DNA&RNA 21 SARSCOV2: CPT

## 2023-12-12 PROCEDURE — 87086 URINE CULTURE/COLONY COUNT: CPT

## 2023-12-12 PROCEDURE — 82977 ASSAY OF GGT: CPT

## 2023-12-12 PROCEDURE — 83519 RIA NONANTIBODY: CPT

## 2023-12-12 PROCEDURE — 87040 BLOOD CULTURE FOR BACTERIA: CPT

## 2023-12-12 RX ORDER — FOLIC ACID 0.8 MG
1 TABLET ORAL
Qty: 30 | Refills: 0
Start: 2023-12-12 | End: 2024-01-10

## 2023-12-12 RX ORDER — CEFAZOLIN SODIUM 1 G
2000 VIAL (EA) INJECTION ONCE
Refills: 0 | Status: DISCONTINUED | OUTPATIENT
Start: 2023-12-12 | End: 2023-12-12

## 2023-12-12 RX ORDER — PANTOPRAZOLE SODIUM 20 MG/1
1 TABLET, DELAYED RELEASE ORAL
Qty: 30 | Refills: 0
Start: 2023-12-12 | End: 2024-01-10

## 2023-12-12 RX ORDER — PREGABALIN 225 MG/1
1 CAPSULE ORAL
Qty: 30 | Refills: 0
Start: 2023-12-12 | End: 2024-01-10

## 2023-12-12 RX ORDER — LISINOPRIL 2.5 MG/1
1 TABLET ORAL
Qty: 30 | Refills: 0
Start: 2023-12-12 | End: 2024-01-10

## 2023-12-12 RX ORDER — ACETAMINOPHEN 500 MG
650 TABLET ORAL EVERY 6 HOURS
Refills: 0 | Status: DISCONTINUED | OUTPATIENT
Start: 2023-12-12 | End: 2023-12-12

## 2023-12-12 RX ADMIN — SODIUM CHLORIDE 75 MILLILITER(S): 9 INJECTION INTRAMUSCULAR; INTRAVENOUS; SUBCUTANEOUS at 00:00

## 2023-12-12 RX ADMIN — Medication 60 MILLIGRAM(S): at 06:06

## 2023-12-12 RX ADMIN — SODIUM CHLORIDE 75 MILLILITER(S): 9 INJECTION INTRAMUSCULAR; INTRAVENOUS; SUBCUTANEOUS at 09:24

## 2023-12-12 RX ADMIN — LISINOPRIL 5 MILLIGRAM(S): 2.5 TABLET ORAL at 06:07

## 2023-12-12 NOTE — PROGRESS NOTE ADULT - REASON FOR ADMISSION
muscle weakness

## 2023-12-12 NOTE — DISCHARGE NOTE PROVIDER - CARE PROVIDERS DIRECT ADDRESSES
,DirectAddress_Unknown,[email or web address],DirectAddress_Unknown,[email or web address] ,DirectAddress_Unknown,[email or web address],DirectAddress_Unknown,[email or web address]

## 2023-12-12 NOTE — CHART NOTE - NSCHARTNOTEFT_GEN_A_CORE
Post-op check:    Pt is a 89 y/o male s/p deltoid muscle biopsy. Patient tolerated procedure well.     Vitals:  Vital Signs Last 24 Hrs  T(C): 36.6 (12 Dec 2023 12:45), Max: 37.1 (12 Dec 2023 00:23)  T(F): 97.9 (12 Dec 2023 12:45), Max: 98.8 (12 Dec 2023 00:23)  HR: 60 (12 Dec 2023 13:15) (54 - 66)  BP: 138/63 (12 Dec 2023 13:15) (137/64 - 157/63)  BP(mean): --  RR: 15 (12 Dec 2023 13:15) (10 - 18)  SpO2: 97% (12 Dec 2023 13:15) (94% - 97%)    Parameters below as of 12 Dec 2023 13:15  Patient On (Oxygen Delivery Method): room air    Patient resting in chair. Pt reports that he has pain at incision site but it is tolerable. Denies nausea, vomiting, SOB, chest pain. Patient is voiding. Patient denies passing flatus and having bowel movement.     Physical exam:  General: alert, patient resting in bed comfortably, in NAD  Resp: equal chest rise bilaterally, nonlabored breathing  Cardio: RRR  RUE: steri-strips remain intact on R deltoid. Palpable radial pulse with sensation intact. Appropriate tenderness at incision site.       Plan:   - Patient may shower with steri-strips in place.   - Pain control  - Appreciate care per primary team

## 2023-12-12 NOTE — DISCHARGE NOTE PROVIDER - NSDCFUSCHEDAPPT_GEN_ALL_CORE_FT
[facility]  INTMED [address]  Scheduled Appointment: 01/08/2024     [facility]  INTMED [address]  Scheduled Appointment: 01/08/2024

## 2023-12-12 NOTE — DISCHARGE NOTE PROVIDER - CARE PROVIDER_API CALL
Babar Vigil  Vascular Surgery  [address], [address]  [place] [address]  Phone: [phone]  Fax: [phone]  Follow Up Time:     Arsh Pedro  Rheumatology  [address] [place] [address]  Phone: [phone]  Fax: [phone]  Follow Up Time:     Uriah Reed  Nephrology  [address], [place] [address]  Phone: [phone]  Fax: [phone]  Follow Up Time:     Jens Das  Neurology  [address], [address]  [place] [address]  Phone: [phone]  Fax: [phone]  Follow Up Time:    Babar Vigil  Vascular Surgery  [address], [address]  [place] [address]  Phone: [phone]  Fax: [phone]  Follow Up Time:     Arsh Pedro  Rheumatology  [address] [place] [address]  Phone: [phone]  Fax: [phone]  Follow Up Time:     Uriah Reed  Nephrology  [address], [place] [address]  Phone: [phone]  Fax: [phone]  Follow Up Time:     Jens Das  Neurology  [address], [address]  [place] [address]  Phone: [phone]  Fax: [phone]  Follow Up Time:

## 2023-12-12 NOTE — DISCHARGE NOTE NURSING/CASE MANAGEMENT/SOCIAL WORK - NSDCPEFALRISK_GEN_ALL_CORE
For information on Fall & Injury Prevention, visit: [email or web address] OR  [email or web address] OR  https://www.cdc.gov/steadi/patient.html For information on Fall & Injury Prevention, visit: [email or web address] OR  [email or web address] OR  https://www.cdc.gov/steadi/patient.html

## 2023-12-12 NOTE — CHART NOTE - NSCHARTNOTEFT_GEN_A_CORE
Pt requires a rolling walker due to mobility related activity of daily living. lower extremity weakness, r/o polymyositis

## 2023-12-12 NOTE — CHART NOTE - NSCHARTNOTEFT_GEN_A_CORE
Please add to DC note instructions: Post-operative wound care - do not remove steri strips at surgical site, they will fall off on their own or will be removed in the office at the time of your follow-up appointment with Dr. Vigil. You may shower tomorrow, do not scrub surgical site, wash with soap and water only.

## 2023-12-12 NOTE — DISCHARGE NOTE PROVIDER - NPI NUMBER (FOR [employer] USE ONLY) :
[[id number]],[[id number]],[[id number]],[[id number]] [[id number]],[[id number]],[[id number]],[[id number]]

## 2023-12-12 NOTE — DISCHARGE NOTE PROVIDER - HOSPITAL COURSE
84yo M w/ hx of hypertension and hyperlipidemia presenting with 2 weeks of progressive upper extremity and lower extremity weakness. Pt states that he noticed 2 weeks prior to admission that he was unable to lift his bilateral upper extremities. Weakness in R worse than L. He also had several falls 2/2 lower extremity weakness. He presented to the ED on 12/2 and was admitted for further workup. He was seen by neurology and rheumatology and myositis serologies were sent. Was started on steroids. 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 Surgery consulted for muscle biopsy for confirmation of diagnosis. Of note, pt was a code sepsis on 12/6 for T 102.7, , RR 40.  Patient with new onset diarrhea. Stool + norovirus. S/P muscle biopsy 12/12. 82yo M w/ hx of hypertension and hyperlipidemia presenting with 2 weeks of progressive upper extremity and lower extremity weakness. Pt states that he noticed 2 weeks prior to admission that he was unable to lift his bilateral upper extremities. Weakness in R worse than L. He also had several falls 2/2 lower extremity weakness. He presented to the ED on 12/2 and was admitted for further workup. He was seen by neurology and rheumatology and myositis serologies were sent. Was started on steroids. 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 Surgery consulted for muscle biopsy for confirmation of diagnosis. Of note, pt was a code sepsis on 12/6 for T 102.7, , RR 40.  Patient with new onset diarrhea. Stool + norovirus. S/P muscle biopsy 12/12. 82yo M w/ hx of hypertension and hyperlipidemia presenting with 2 weeks of progressive upper extremity and lower extremity weakness. Pt states that he noticed 2 weeks prior to admission that he was unable to lift his bilateral upper extremities. Weakness in R worse than L. He also had several falls 2/2 lower extremity weakness. He presented to the ED on 12/2 and was admitted for further workup. He was seen by neurology and rheumatology and myositis serologies were sent. Was started on steroids. 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 Surgery consulted for muscle biopsy for confirmation of diagnosis. Of note, pt was a code sepsis on 12/6 for T 102.7, , RR 40.  Patient with new onset diarrhea. Stool + norovirus. S/P muscle biopsy 12/12. As per Rheum, dc on Prednisone 60 mg daily, follow up with Rheumatology for biopsy results and further titration of steroids. The patient is medically stable for discharge. 84yo M w/ hx of hypertension and hyperlipidemia presenting with 2 weeks of progressive upper extremity and lower extremity weakness. Pt states that he noticed 2 weeks prior to admission that he was unable to lift his bilateral upper extremities. Weakness in R worse than L. He also had several falls 2/2 lower extremity weakness. He presented to the ED on 12/2 and was admitted for further workup. He was seen by neurology and rheumatology and myositis serologies were sent. Was started on steroids. 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 Surgery consulted for muscle biopsy for confirmation of diagnosis. Of note, pt was a code sepsis on 12/6 for T 102.7, , RR 40.  Patient with new onset diarrhea. Stool + norovirus. S/P muscle biopsy 12/12. As per Rheum, dc on Prednisone 60 mg daily, follow up with Rheumatology for biopsy results and further titration of steroids. The patient is medically stable for discharge.

## 2023-12-12 NOTE — DISCHARGE NOTE PROVIDER - PROVIDER TOKENS
PROVIDER:[TOKEN:[[id number]]],PROVIDER:[TOKEN:[[id number]]],PROVIDER:[TOKEN:[[id number]]],PROVIDER:[TOKEN:[[id number]]] PROVIDER:[TOKEN:[[id number]]],PROVIDER:[TOKEN:[[id number]]],PROVIDER:[TOKEN:[[id number]]],PROVIDER:[TOKEN:[[id number]]]

## 2023-12-12 NOTE — DISCHARGE NOTE PROVIDER - NSDCCPCAREPLAN_GEN_ALL_CORE_FT
PRINCIPAL DISCHARGE DIAGNOSIS  Diagnosis: Muscle weakness  Assessment and Plan of Treatment: muscle weakness, suspected myositis vs statin induced  -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   -S/P muscle biopsy 12/12  -cont po prednisone per rheum      SECONDARY DISCHARGE DIAGNOSES  Diagnosis: Elevated CK  Assessment and Plan of Treatment: You underwent muscle biopsy on 12/12  Continue your current dose of prednisone.  Follow up with Rhematology in 1-2 weeks for biopsy results and further titration of steroids.  Continue to hold your statin    Diagnosis: Norovirus  Assessment and Plan of Treatment: Supportive care    Diagnosis: Elevated liver enzymes  Assessment and Plan of Treatment: - likely related to myositis vs drug induced from prior statin use  - no RUQ tenderness, bilirubin wnl  - Repeat  LFTs in 1-2 weeks

## 2023-12-12 NOTE — PROGRESS NOTE ADULT - PROVIDER SPECIALTY LIST ADULT
Hospitalist
Nephrology
Nephrology
Neurology
Neurology
Surgery
Hospitalist
Hospitalist
Nephrology
Neurology
Surgery
Neurology
Surgery
Surgery
Hospitalist
Neurology
Surgery

## 2023-12-12 NOTE — PROGRESS NOTE ADULT - SUBJECTIVE AND OBJECTIVE BOX
Pt admitted with progressive muscle weakness   Pt to go for rt muscle bx  Pt expaline dht eprocedure benefits risks and alternatives and understood

## 2023-12-12 NOTE — DISCHARGE NOTE PROVIDER - ATTENDING DISCHARGE PHYSICAL EXAMINATION:
T(C): 36.6 (12-12-23 @ 12:45), Max: 37.1 (12-12-23 @ 00:23)  HR: 60 (12-12-23 @ 13:15) (54 - 66)  BP: 138/63 (12-12-23 @ 13:15) (137/64 - 157/63)  RR: 15 (12-12-23 @ 13:15) (10 - 18)  SpO2: 97% (12-12-23 @ 13:15) (94% - 97%)    GENERAL: not in acute distress  HEENT:  Clear conjunctiva, PERRL, moist oral mucosa  RESP:  Non-labored breathing pattern, lungs clear to ausculation, no wheezes or crackles appreciated  CV: Regular rate and rhythm, no murmurs appreciated, no lower extremity edema, peripheral pulses are 2+ bilaterally  GI: Soft, non-tender, non-distended  NEURO: Awake, alert, conversant, upper and lower extremity strength 5/5 except when trying to lift arms above shoulder level, unable to keep arms elevated when placed above that height, light touch sensation grossly intact  PSYCH: Calm, cooperative, A&Ox3  SKIN: No rash or lesions, warm and dry

## 2023-12-12 NOTE — ASU PREOP CHECKLIST - TEMPERATURE IN CELSIUS (DEGREES C)
36.2
Patient presents to Ed with c/o urinary symptoms. Patient with hx of BPH reports having a prostate biopsy on Wednesday July 6th then around 2;30 this am went to urinate and urine dribbled. Patient reports when providing a urine sample today  in Ed his urine stream was normal. Patient denies pain on urination denies blood in urine or frequency. Rt hand 20g iv lock  placed labs drawn and sent.

## 2023-12-12 NOTE — DISCHARGE NOTE PROVIDER - NSDCMRMEDTOKEN_GEN_ALL_CORE_FT
Jaclyn Rizo: as directed   cyanocobalamin 1000 mcg oral tablet: 1 tab(s) orally once a day  folic acid 1 mg oral tablet: 1 tab(s) orally once a day  lisinopril 5 mg oral tablet: 1 tab(s) orally once a day  pantoprazole 40 mg oral delayed release tablet: 1 tab(s) orally once a day (before a meal)  predniSONE 20 mg oral tablet: 3 tab(s) orally once a day

## 2023-12-12 NOTE — DISCHARGE NOTE PROVIDER - NSDCFUADDINST_GEN_ALL_CORE_FT
Post-operative wound care - do not remove steri strips at surgical site, they will fall off on their own or will be removed in the office at the time of your follow-up appointment with Dr. Vigil. You may shower tomorrow, do not scrub surgical site, wash with soap and water only.

## 2023-12-12 NOTE — PROGRESS NOTE ADULT - NUTRITIONAL ASSESSMENT
This patient has been assessed with a concern for Malnutrition and has been determined to have a diagnosis/diagnoses of Moderate protein-calorie malnutrition.    This patient is being managed with:   Diet NPO after Midnight-     NPO Start Date: 11-Dec-2023   NPO Start Time: 23:59  Except Medications  Entered: Dec 11 2023 11:13AM    Diet DASH/TLC-  Sodium & Cholesterol Restricted  Entered: Dec  2 2023  3:24PM  
This patient has been assessed with a concern for Malnutrition and has been determined to have a diagnosis/diagnoses of Moderate protein-calorie malnutrition.    This patient is being managed with:   Diet NPO after Midnight-     NPO Start Date: 11-Dec-2023   NPO Start Time: 23:59  Except Medications  Entered: Dec 11 2023 11:13AM    Diet DASH/TLC-  Sodium & Cholesterol Restricted  Entered: Dec  2 2023  3:24PM  
This patient has been assessed with a concern for Malnutrition and has been determined to have a diagnosis/diagnoses of Moderate protein-calorie malnutrition.    This patient is being managed with:   Diet DASH/TLC-  Sodium & Cholesterol Restricted  Entered: Dec  2 2023  3:24PM  
This patient has been assessed with a concern for Malnutrition and has been determined to have a diagnosis/diagnoses of Moderate protein-calorie malnutrition.    This patient is being managed with:   Diet NPO after Midnight-     NPO Start Date: 10-Dec-2023   NPO Start Time: 23:59  Entered: Dec 10 2023  9:29AM    Diet DASH/TLC-  Sodium & Cholesterol Restricted  Entered: Dec  2 2023  3:24PM

## 2023-12-12 NOTE — DISCHARGE NOTE PROVIDER - DETAILS OF MALNUTRITION DIAGNOSIS/DIAGNOSES
This patient has been assessed with a concern for Malnutrition and was treated during this hospitalization for the following Nutrition diagnosis/diagnoses:     -  12/10/2023: Moderate protein-calorie malnutrition

## 2023-12-12 NOTE — DISCHARGE NOTE NURSING/CASE MANAGEMENT/SOCIAL WORK - PATIENT PORTAL LINK FT
You can access the FollowMyHealth Patient Portal offered by [facility] by registering at the following website: [email or web address]. By joining [employer]’s FollowMyHealth portal, you will also be able to view your health information using other applications (apps) compatible with our system. You can access the FollowMyHealth Patient Portal offered by [facility] by registering at the following website: [email or web address]. By joining [facility]’s FollowMyHealth portal, you will also be able to view your health information using other applications (apps) compatible with our system.

## 2023-12-20 LAB
ANTI PM-SCL-100 PLUS: <20 UNITS — SIGNIFICANT CHANGE UP
ANTI PM-SCL-100 PLUS: <20 UNITS — SIGNIFICANT CHANGE UP
ANTI-SAE 1 IGG: <20 UNITS — SIGNIFICANT CHANGE UP
ANTI-SAE 1 IGG: <20 UNITS — SIGNIFICANT CHANGE UP
ANTI-SS-A 52 KD AB, IGG PLUS: <20 UNITS — SIGNIFICANT CHANGE UP
ANTI-SS-A 52 KD AB, IGG PLUS: <20 UNITS — SIGNIFICANT CHANGE UP
ANTI-U1-RNP AB PLUS: <20 UNITS — SIGNIFICANT CHANGE UP
ANTI-U1-RNP AB PLUS: <20 UNITS — SIGNIFICANT CHANGE UP
EJ MYOMARKER3 PLUS: NEGATIVE — SIGNIFICANT CHANGE UP
EJ MYOMARKER3 PLUS: NEGATIVE — SIGNIFICANT CHANGE UP
ENA JO1 AB SER IA-ACNC: <20 UNITS — SIGNIFICANT CHANGE UP
ENA JO1 AB SER IA-ACNC: <20 UNITS — SIGNIFICANT CHANGE UP
FIBRILLARIN (U3 RNP) PLUS: NEGATIVE — SIGNIFICANT CHANGE UP
FIBRILLARIN (U3 RNP) PLUS: NEGATIVE — SIGNIFICANT CHANGE UP
KU MYOMARKER3 PLUS: NEGATIVE — SIGNIFICANT CHANGE UP
KU MYOMARKER3 PLUS: NEGATIVE — SIGNIFICANT CHANGE UP
MDA5 (P140)(CADM-140) PLUS: <20 UNITS — SIGNIFICANT CHANGE UP
MDA5 (P140)(CADM-140) PLUS: <20 UNITS — SIGNIFICANT CHANGE UP
MI-2 PLUS: NEGATIVE — SIGNIFICANT CHANGE UP
MI-2 PLUS: NEGATIVE — SIGNIFICANT CHANGE UP
NXP-2 (P140) MYOPLUS: <20 UNITS — SIGNIFICANT CHANGE UP
NXP-2 (P140) MYOPLUS: <20 UNITS — SIGNIFICANT CHANGE UP
OJ MYOMARKER3 PLUS: NEGATIVE — SIGNIFICANT CHANGE UP
OJ MYOMARKER3 PLUS: NEGATIVE — SIGNIFICANT CHANGE UP
PL-12 PLUS: NEGATIVE — SIGNIFICANT CHANGE UP
PL-12 PLUS: NEGATIVE — SIGNIFICANT CHANGE UP
PL-7 PLUS: NEGATIVE — SIGNIFICANT CHANGE UP
PL-7 PLUS: NEGATIVE — SIGNIFICANT CHANGE UP
SRP MYOMARKER3 PLUS: NEGATIVE — SIGNIFICANT CHANGE UP
SRP MYOMARKER3 PLUS: NEGATIVE — SIGNIFICANT CHANGE UP
TIF GAMMA (P155/140) PLUS: <20 UNITS — SIGNIFICANT CHANGE UP
TIF GAMMA (P155/140) PLUS: <20 UNITS — SIGNIFICANT CHANGE UP
U2 SNRNP PLUS: NEGATIVE — SIGNIFICANT CHANGE UP
U2 SNRNP PLUS: NEGATIVE — SIGNIFICANT CHANGE UP

## 2023-12-21 LAB
SURGICAL PATHOLOGY STUDY: SIGNIFICANT CHANGE UP
SURGICAL PATHOLOGY STUDY: SIGNIFICANT CHANGE UP

## 2023-12-25 LAB
AMPHIPHYSIN AB TITR SER: NEGATIVE — SIGNIFICANT CHANGE UP
AMPHIPHYSIN AB TITR SER: NEGATIVE — SIGNIFICANT CHANGE UP
CN-1A AB SER IA-ACNC: <20 UNITS — SIGNIFICANT CHANGE UP
CN-1A AB SER IA-ACNC: <20 UNITS — SIGNIFICANT CHANGE UP
CRMP-5-IGG WESTERN BLOT: NEGATIVE — SIGNIFICANT CHANGE UP
CRMP-5-IGG WESTERN BLOT: NEGATIVE — SIGNIFICANT CHANGE UP
GLIAL NUC TYPE 1 AB TITR SER: NEGATIVE — SIGNIFICANT CHANGE UP
GLIAL NUC TYPE 1 AB TITR SER: NEGATIVE — SIGNIFICANT CHANGE UP
HU1 AB TITR SER: NEGATIVE — SIGNIFICANT CHANGE UP
HU1 AB TITR SER: NEGATIVE — SIGNIFICANT CHANGE UP
HU2 AB TITR SER IF: NEGATIVE — SIGNIFICANT CHANGE UP
HU2 AB TITR SER IF: NEGATIVE — SIGNIFICANT CHANGE UP
HU3 AB TITR SER: NEGATIVE — SIGNIFICANT CHANGE UP
HU3 AB TITR SER: NEGATIVE — SIGNIFICANT CHANGE UP
IFA NOTES: SIGNIFICANT CHANGE UP
IFA NOTES: SIGNIFICANT CHANGE UP
MI2 AB SER QL: NEGATIVE — SIGNIFICANT CHANGE UP
MI2 AB SER QL: NEGATIVE — SIGNIFICANT CHANGE UP
P/Q TYPE ANTIBODY: 0 NMOL/L — SIGNIFICANT CHANGE UP
P/Q TYPE ANTIBODY: 0 NMOL/L — SIGNIFICANT CHANGE UP
PARANEOPLASTIC AB SER-IMP: SIGNIFICANT CHANGE UP
PARANEOPLASTIC AB SER-IMP: SIGNIFICANT CHANGE UP
PCA-1 AB TITR SER: NEGATIVE — SIGNIFICANT CHANGE UP
PCA-1 AB TITR SER: NEGATIVE — SIGNIFICANT CHANGE UP
PCA-2 AB TITR SER: NEGATIVE — SIGNIFICANT CHANGE UP
PCA-2 AB TITR SER: NEGATIVE — SIGNIFICANT CHANGE UP
PCA-TR AB TITR SER: NEGATIVE — SIGNIFICANT CHANGE UP
PCA-TR AB TITR SER: NEGATIVE — SIGNIFICANT CHANGE UP
VGKC AB SER-SCNC: 0 NMOL/L — SIGNIFICANT CHANGE UP
VGKC AB SER-SCNC: 0 NMOL/L — SIGNIFICANT CHANGE UP

## 2024-01-08 ENCOUNTER — NON-APPOINTMENT (OUTPATIENT)
Age: 84
End: 2024-01-08

## 2024-01-08 ENCOUNTER — APPOINTMENT (OUTPATIENT)
Dept: INTERNAL MEDICINE | Facility: CLINIC | Age: 84
End: 2024-01-08
Payer: MEDICARE

## 2024-01-08 VITALS
OXYGEN SATURATION: 97 % | RESPIRATION RATE: 12 BRPM | DIASTOLIC BLOOD PRESSURE: 68 MMHG | HEIGHT: 66 IN | BODY MASS INDEX: 27.28 KG/M2 | SYSTOLIC BLOOD PRESSURE: 132 MMHG | WEIGHT: 169.76 LBS | HEART RATE: 66 BPM

## 2024-01-08 DIAGNOSIS — Z00.00 ENCOUNTER FOR GENERAL ADULT MEDICAL EXAMINATION W/OUT ABNORMAL FINDINGS: ICD-10-CM

## 2024-01-08 DIAGNOSIS — Z86.39 PERSONAL HISTORY OF OTHER ENDOCRINE, NUTRITIONAL AND METABOLIC DISEASE: ICD-10-CM

## 2024-01-08 DIAGNOSIS — Z86.79 PERSONAL HISTORY OF OTHER DISEASES OF THE CIRCULATORY SYSTEM: ICD-10-CM

## 2024-01-08 PROCEDURE — G0439: CPT

## 2024-01-08 PROCEDURE — 36415 COLL VENOUS BLD VENIPUNCTURE: CPT

## 2024-01-08 PROCEDURE — G0438: CPT

## 2024-01-08 RX ORDER — FOLIC ACID 1 MG/1
1 TABLET ORAL DAILY
Qty: 90 | Refills: 0 | Status: ACTIVE | COMMUNITY
Start: 2024-01-08 | End: 1900-01-01

## 2024-01-08 RX ORDER — PNV NO.95/FERROUS FUM/FOLIC AC 28MG-0.8MG
100 TABLET ORAL DAILY
Qty: 90 | Refills: 0 | Status: ACTIVE | COMMUNITY
Start: 2024-01-08 | End: 1900-01-01

## 2024-01-08 NOTE — REVIEW OF SYSTEMS
[Unsteady Walk] : ataxia [Negative] : Heme/Lymph [Headache] : no headache [Dizziness] : no dizziness [Fainting] : no fainting [Confusion] : no confusion [Memory Loss] : no memory loss [de-identified] : weakness in legs and shoulders

## 2024-01-08 NOTE — HISTORY OF PRESENT ILLNESS
[FreeTextEntry1] : Yearly visit, leg swelling, home PT renewal/wheelchair  [de-identified] : 83 M PMHx myopathy, HTN, HLD. In for yearly physical. Was recently admitted to [facility], presenting with 2 weeks of progressive upper extremity and lower extremity weakness. Pt states that he noticed 2 weeks prior to admission that he was unable to lift his bilateral upper extremities. Weakness in R worse than L. He also had several falls 2/2 lower extremity weakness. He presented to the ED on 12/2 and was admitted for further workup. He was seen by neurology and rheumatology and myositis serologies were sent. Was started on steroids. 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 Muscle biopsy performed for confirmation of diagnosis. DC on 60 mg steroids, follow up with rheumatology.

## 2024-01-08 NOTE — COUNSELING
[Fall prevention counseling provided] : Fall prevention counseling provided [No] : Risk of tobacco use and health benefits of smoking cessation discussed: No [AUDIT-C Screening administered and reviewed] : AUDIT-C Screening administered and reviewed [Hazards of at-risk alcohol use discussed] : Hazards of at-risk alcohol use discussed [Strategies to reduce or eliminate alcohol use discussed] : Strategies to reduce or eliminate alcohol use discussed [Support options provided] : Support options provided [Quit Drinking] : Quit Drinking [ - Annual Lung Cancer Screening/Share Decision Making Discussion] : Annual Lung Cancer Screening/Share Decision Making Discussion. (I have advised this patient to have a Low Dose CT (LDCT) scan of the lungs and have discussed the following with the patient in a shared decision making discussion:   Benefits of Detection and Early Treatment: There is adequate evidence that annual screening for lung cancer with LDCT in a population of high-risk persons can prevent a substantial number of lung cancer-related deaths. The magnitude of benefit depends on the individual patient's risk for lung cancer, as those who are at highest risk are most likely to benefit. Screening cannot prevent most lung cancer-related deaths, and does not replace smoking cessation. Harms of Detection and Early Intervention and Treatment: The harms associated with LDCT screening include false-negative and false-positive results, incidental findings, over diagnosis, and radiation exposure. False-positive LDCT results occur in a substantial proportion of screened persons; 95% of all positive results do not lead to a diagnosis of cancer. In a high-quality screening program, further imaging can resolve most false-positive results; however, some patients may require invasive procedures. Radiation harms, including cancer resulting from cumulative exposure to radiation, vary depending on the age at the start of screening; the number of scans received; and the person's exposure to other sources of radiation, particularly other medical imaging.) [None] : None [Good understanding] : Patient has a good understanding of lifestyle changes and steps needed to achieve self management goal [FreeTextEntry1] : 10

## 2024-01-08 NOTE — HEALTH RISK ASSESSMENT
[Fair] :  ~his/her~ mood as fair [Intercurrent ED visits] : went to ED [Yes] : Yes [2 - 3 times a week (3 pts)] : 2 - 3  times a week (3 points) [3 or 4 (1 pt)] : 3 or 4  (1 point) [Monthly (2 pts)] : Monthly (2 points) [Two or more falls in past year] : Patient reported two or more falls in the past year [Patient not ambulatory] : Patient is not ambulatory [Assistive Device] : Patient uses an assistive device [Little interest or pleasure doing things] : 1) Little interest or pleasure doing things [Feeling down, depressed, or hopeless] : 2) Feeling down, depressed, or hopeless [0] : 2) Feeling down, depressed, or hopeless: Not at all (0) [PHQ-2 Negative - No further assessment needed] : PHQ-2 Negative - No further assessment needed [Audit-CScore] : 6 [de-identified] : Walking  [de-identified] : Needs wheelchair since muscle weakness  [[id number]] : 0 [HIV test declined] : HIV test declined [Change in mental status noted] : No change in mental status noted [Language] : denies difficulty with language [Behavior] : denies difficulty with behavior [Learning/Retaining New Information] : denies difficulty learning/retaining new information [Reasoning] : denies difficulty with reasoning [Spatial Ability and Orientation] : denies difficulty with spatial ability and orientation [None] : None [With Significant Other] : lives with significant other [Retired] : retired [] :  [Sexually Active] : not sexually active [High Risk Behavior] : no high risk behavior [Feels Safe at Home] : Feels safe at home [Reports changes in hearing] : Reports no changes in hearing [Reports changes in vision] : Reports no changes in vision [Reports normal functional visual acuity (ie: able to read med bottle)] : Reports poor functional visual acuity.  [Smoke Detector] : smoke detector [Carbon Monoxide Detector] : carbon monoxide detector [Guns at Home] : no guns at home [Safety elements used in home] : safety elements used in home [Seat Belt] :  uses seat belt [Sunscreen] : does not use sunscreen [TB Exposure] : is not being exposed to tuberculosis [Caregiver Concerns] : does not have caregiver concerns [de-identified] : needs assistacne [de-identified] : needs assistacne [Patient/Caregiver not ready to engage] : , patient/caregiver not ready to engage [AdvancecareDate] : 1/8/24

## 2024-01-08 NOTE — PHYSICAL EXAM
[No Joint Swelling] : no joint swelling [No Focal Deficits] : no focal deficits [Normal] : affect was normal and insight and judgment were intact [de-identified] : 2/5 sterngth in hips and shoulders  [de-identified] : Unable to get out of chair,

## 2024-01-08 NOTE — PLAN
Yes [FreeTextEntry1] : 83 M PMHx myopathy, HTN, HLD. In for yearly physical. Recently admitted to [facility] for polymyositis.   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 - Muscle biopsy performed  - C/w steroids 60 mg prednisone qd, folic acid and Vit B12 qd  - ESR, CRP, CPK, CK ordered  - Follow up with Rheumatology - Follow up visit in 5 weeks - PT ordered  HTN - BP slightly elevated  - c/w lisinopril 5 mg qd   HLD - DC statin - Lipid panel ordered, will evaluate need for antilipid drugs after  Health maintenance  - EKG NS - Vitals WNL, BP elevated - CBC, CMP, TSH/T4, lipid panel, HgbA1c ordered   Alcohol abuse - discussed plan to cut down on alcohol use, patient was open to cutting back

## 2024-01-08 NOTE — REASON FOR VISIT
65 y/o M with h/o CAD s/p CABG and stent, gout, HLD, HTN presents to the ED for chest pain. Admit to telemetry. [Annual Wellness Visit] : an annual wellness visit [FreeTextEntry1] : Dr. Cornejo, use to follow for 35 years, due to mismanagement

## 2024-01-09 LAB
ALBUMIN SERPL ELPH-MCNC: 3.7 G/DL
ALP BLD-CCNC: 105 U/L
ALT SERPL-CCNC: 601 U/L
ANION GAP SERPL CALC-SCNC: 13 MMOL/L
AST SERPL-CCNC: 306 U/L
BILIRUB SERPL-MCNC: 0.4 MG/DL
BUN SERPL-MCNC: 16 MG/DL
CALCIUM SERPL-MCNC: 9 MG/DL
CHLORIDE SERPL-SCNC: 102 MMOL/L
CHOLEST SERPL-MCNC: 221 MG/DL
CK SERPL-CCNC: 4084 U/L
CO2 SERPL-SCNC: 23 MMOL/L
CREAT SERPL-MCNC: 0.42 MG/DL
CRP SERPL-MCNC: 4 MG/L
EGFR: 107 ML/MIN/1.73M2
ERYTHROCYTE [SEDIMENTATION RATE] IN BLOOD BY WESTERGREN METHOD: 24 MM/HR
ESTIMATED AVERAGE GLUCOSE: 114 MG/DL
GLUCOSE SERPL-MCNC: 109 MG/DL
HBA1C MFR BLD HPLC: 5.6 %
HDLC SERPL-MCNC: 69 MG/DL
LDLC SERPL CALC-MCNC: 134 MG/DL
NONHDLC SERPL-MCNC: 152 MG/DL
POTASSIUM SERPL-SCNC: 4.9 MMOL/L
PROT SERPL-MCNC: 5.7 G/DL
SODIUM SERPL-SCNC: 138 MMOL/L
TRIGL SERPL-MCNC: 104 MG/DL
TSH SERPL-ACNC: 2.14 UIU/ML

## 2024-01-10 LAB
HCT VFR BLD CALC: 43.5 %
HGB BLD-MCNC: 13.5 G/DL
MCHC RBC-ENTMCNC: 31 GM/DL
MCHC RBC-ENTMCNC: 32.1 PG
MCV RBC AUTO: 103.3 FL
PLATELET # BLD AUTO: 383 K/UL
RBC # BLD: 4.21 M/UL
RBC # FLD: 16.2 %
WBC # FLD AUTO: 17.84 K/UL

## 2024-01-22 ENCOUNTER — TRANSCRIPTION ENCOUNTER (OUTPATIENT)
Age: 84
End: 2024-01-22

## 2024-02-12 ENCOUNTER — APPOINTMENT (OUTPATIENT)
Dept: INTERNAL MEDICINE | Facility: CLINIC | Age: 84
End: 2024-02-12
Payer: MEDICARE

## 2024-02-12 VITALS
DIASTOLIC BLOOD PRESSURE: 64 MMHG | HEIGHT: 66 IN | RESPIRATION RATE: 14 BRPM | WEIGHT: 160 LBS | SYSTOLIC BLOOD PRESSURE: 128 MMHG | HEART RATE: 66 BPM | BODY MASS INDEX: 25.71 KG/M2

## 2024-02-12 DIAGNOSIS — D64.9 ANEMIA, UNSPECIFIED: ICD-10-CM

## 2024-02-12 DIAGNOSIS — I10 ESSENTIAL (PRIMARY) HYPERTENSION: ICD-10-CM

## 2024-02-12 PROCEDURE — 99214 OFFICE O/P EST MOD 30 MIN: CPT | Mod: GC

## 2024-02-12 PROCEDURE — 36415 COLL VENOUS BLD VENIPUNCTURE: CPT | Mod: GC

## 2024-02-12 RX ORDER — POTASSIUM CHLORIDE 1.5 G/1.58G
20 POWDER, FOR SOLUTION ORAL DAILY
Qty: 30 | Refills: 0 | Status: ACTIVE | COMMUNITY
Start: 2024-02-12 | End: 1900-01-01

## 2024-02-12 RX ORDER — FAMOTIDINE 20 MG/1
20 TABLET, FILM COATED ORAL DAILY
Qty: 30 | Refills: 2 | Status: ACTIVE | COMMUNITY
Start: 2024-02-12 | End: 1900-01-01

## 2024-02-12 NOTE — HISTORY OF PRESENT ILLNESS
[FreeTextEntry1] : Follow up post ER and polymyositis [de-identified] : Recently went to ER 2/2, due to LE swelling,  Walking more now, no longer using wheelchair  CHF: 2/2 steroids, feet swollen, started having issues with this since starting steroids, has SOB, denies cough, fever, chills, no Cardiologist.  Polymyositis, taking steroids, uses walker now, muscle pain improving, needs refill of steroids.  No longer having GERD.

## 2024-02-12 NOTE — HEALTH RISK ASSESSMENT
[Intercurrent Urgi Care visits] : went to urgent care [No] : No [Monthly or less (1 pt)] : Monthly or less (1 point) [1 or 2 (0 pts)] : 1 or 2 (0 points) [Never (0 pts)] : Never (0 points) [Never] : Never [0-4] : 0-4 [de-identified] : Swollen feet  [Audit-CScore] : 1

## 2024-02-12 NOTE — PHYSICAL EXAM
[Normal] : affect was normal and insight and judgment were intact [de-identified] : Pitting edema b/l to midshin

## 2024-02-12 NOTE — COUNSELING
[Fall prevention counseling provided] : Fall prevention counseling provided [No] : Risk of tobacco use and health benefits of smoking cessation discussed: No

## 2024-02-12 NOTE — REVIEW OF SYSTEMS
[Lower Ext Edema] : lower extremity edema [Shortness Of Breath] : shortness of breath [Dyspnea on Exertion] : dyspnea on exertion [Muscle Pain] : muscle pain [Muscle Weakness] : muscle weakness [Negative] : Heme/Lymph [Chest Pain] : no chest pain [Palpitations] : no palpitations [Claudication] : no  leg claudication [Orthopena] : no orthopnea [Paroxysmal Nocturnal Dyspnea] : no paroxysmal nocturnal dyspnea [Wheezing] : no wheezing [Cough] : no cough [Joint Pain] : no joint pain [Joint Stiffness] : no joint stiffness [Back Pain] : no back pain [Joint Swelling] : no joint swelling

## 2024-02-12 NOTE — PLAN
[FreeTextEntry1] : 83 M PMHx GERD, HTN  Polymyositis  - Using a walker now, out of wheelchair  - c/w 60 mg prednisone qd until rheumatologist  - symptoms improving  - Repeat CK/ESR/CRP - Rheumatologist 2/28/24  - Follow up in 3 months   CHF - HTN, LE edema likely due to steroids  - c/w Lisinopril 5 mg qd  - BMP  - Furosemide 20 mg BID  - Potassium 20 mg qd  - Cardiology referral   HTN  - Likely due to steroids  - C/w lisinopril  - Cardiology referral   GERD - No longer having symptoms  - Pepcid 20 mg qd stop if no longer having GERD

## 2024-02-14 LAB
ANION GAP SERPL CALC-SCNC: 13 MMOL/L
BUN SERPL-MCNC: 18 MG/DL
CALCIUM SERPL-MCNC: 9.6 MG/DL
CHLORIDE SERPL-SCNC: 99 MMOL/L
CK SERPL-CCNC: 2197 U/L
CO2 SERPL-SCNC: 27 MMOL/L
CREAT SERPL-MCNC: 0.46 MG/DL
CRP SERPL-MCNC: <3 MG/L
EGFR: 104 ML/MIN/1.73M2
ERYTHROCYTE [SEDIMENTATION RATE] IN BLOOD BY WESTERGREN METHOD: 26 MM/HR
GLUCOSE SERPL-MCNC: 104 MG/DL
HCT VFR BLD CALC: 45.6 %
HGB BLD-MCNC: 14.9 G/DL
MCHC RBC-ENTMCNC: 31.2 PG
MCHC RBC-ENTMCNC: 32.7 GM/DL
MCV RBC AUTO: 95.4 FL
PLATELET # BLD AUTO: 345 K/UL
POTASSIUM SERPL-SCNC: 4.6 MMOL/L
RBC # BLD: 4.78 M/UL
RBC # FLD: 15.4 %
SODIUM SERPL-SCNC: 138 MMOL/L
WBC # FLD AUTO: 15.17 K/UL

## 2024-02-22 ENCOUNTER — APPOINTMENT (OUTPATIENT)
Dept: RHEUMATOLOGY | Facility: CLINIC | Age: 84
End: 2024-02-22

## 2024-02-28 ENCOUNTER — APPOINTMENT (OUTPATIENT)
Dept: RHEUMATOLOGY | Facility: CLINIC | Age: 84
End: 2024-02-28
Payer: MEDICARE

## 2024-02-28 VITALS
DIASTOLIC BLOOD PRESSURE: 62 MMHG | TEMPERATURE: 98 F | OXYGEN SATURATION: 97 % | SYSTOLIC BLOOD PRESSURE: 125 MMHG | HEART RATE: 62 BPM

## 2024-02-28 DIAGNOSIS — M33.20 POLYMYOSITIS, ORGAN INVOLVEMENT UNSPECIFIED: ICD-10-CM

## 2024-02-28 DIAGNOSIS — K21.9 GASTRO-ESOPHAGEAL REFLUX DISEASE W/OUT ESOPHAGITIS: ICD-10-CM

## 2024-02-28 DIAGNOSIS — I50.9 HEART FAILURE, UNSPECIFIED: ICD-10-CM

## 2024-02-28 DIAGNOSIS — T38.0X5A ADVERSE EFFECT OF GLUCOCORTICOIDS AND SYNTHETIC ANALOGUES, INITIAL ENCOUNTER: ICD-10-CM

## 2024-02-28 LAB
ALBUMIN SERPL ELPH-MCNC: 4.1 G/DL
ALP BLD-CCNC: 77 U/L
ALT SERPL-CCNC: 70 U/L
ANION GAP SERPL CALC-SCNC: 11 MMOL/L
AST SERPL-CCNC: 41 U/L
BILIRUB SERPL-MCNC: 0.5 MG/DL
BUN SERPL-MCNC: 19 MG/DL
CALCIUM SERPL-MCNC: 9.3 MG/DL
CHLORIDE SERPL-SCNC: 104 MMOL/L
CK SERPL-CCNC: 600 U/L
CO2 SERPL-SCNC: 24 MMOL/L
CREAT SERPL-MCNC: 0.63 MG/DL
CRP SERPL-MCNC: <3 MG/L
EGFR: 95 ML/MIN/1.73M2
GLUCOSE SERPL-MCNC: 132 MG/DL
LDH SERPL-CCNC: 386 U/L
POTASSIUM SERPL-SCNC: 4.6 MMOL/L
PROT SERPL-MCNC: 6 G/DL
SODIUM SERPL-SCNC: 139 MMOL/L

## 2024-02-28 PROCEDURE — 99215 OFFICE O/P EST HI 40 MIN: CPT

## 2024-02-28 PROCEDURE — 36415 COLL VENOUS BLD VENIPUNCTURE: CPT

## 2024-02-28 PROCEDURE — G2211 COMPLEX E/M VISIT ADD ON: CPT

## 2024-02-28 RX ORDER — FOLIC ACID 1 MG/1
1 TABLET ORAL
Refills: 0 | Status: COMPLETED | COMMUNITY

## 2024-02-28 RX ORDER — LISINOPRIL 5 MG/1
5 TABLET ORAL
Refills: 0 | Status: COMPLETED | COMMUNITY

## 2024-02-28 RX ORDER — PREDNISONE 20 MG/1
20 TABLET ORAL
Qty: 60 | Refills: 2 | Status: ACTIVE | COMMUNITY
Start: 2024-02-28 | End: 1900-01-01

## 2024-02-28 RX ORDER — PREDNISONE 20 MG/1
20 TABLET ORAL DAILY
Qty: 42 | Refills: 0 | Status: COMPLETED | COMMUNITY

## 2024-02-28 RX ORDER — PREDNISONE 10 MG/1
10 TABLET ORAL
Qty: 20 | Refills: 0 | Status: DISCONTINUED | COMMUNITY
Start: 2024-02-12 | End: 2024-02-28

## 2024-02-28 RX ORDER — PANTOPRAZOLE SODIUM 40 MG/1
40 TABLET, DELAYED RELEASE ORAL
Refills: 0 | Status: COMPLETED | COMMUNITY

## 2024-02-29 PROBLEM — K21.9 GERD (GASTROESOPHAGEAL REFLUX DISEASE): Status: ACTIVE | Noted: 2024-01-08

## 2024-02-29 PROBLEM — I50.9 CHF (CONGESTIVE HEART FAILURE): Status: ACTIVE | Noted: 2024-02-12

## 2024-03-02 LAB — HMGCR ANTIBODY, IGG: 29 UNITS

## 2024-03-06 ENCOUNTER — APPOINTMENT (OUTPATIENT)
Dept: CARDIOLOGY | Facility: CLINIC | Age: 84
End: 2024-03-06
Payer: MEDICARE

## 2024-03-06 VITALS
BODY MASS INDEX: 26.52 KG/M2 | DIASTOLIC BLOOD PRESSURE: 84 MMHG | HEIGHT: 66 IN | SYSTOLIC BLOOD PRESSURE: 130 MMHG | WEIGHT: 165 LBS | TEMPERATURE: 98.6 F | OXYGEN SATURATION: 97 % | HEART RATE: 70 BPM

## 2024-03-06 VITALS — SYSTOLIC BLOOD PRESSURE: 128 MMHG | DIASTOLIC BLOOD PRESSURE: 64 MMHG

## 2024-03-06 DIAGNOSIS — F12.90 CANNABIS USE, UNSPECIFIED, UNCOMPLICATED: ICD-10-CM

## 2024-03-06 DIAGNOSIS — Z78.9 OTHER SPECIFIED HEALTH STATUS: ICD-10-CM

## 2024-03-06 PROCEDURE — 93000 ELECTROCARDIOGRAM COMPLETE: CPT

## 2024-03-06 PROCEDURE — 99204 OFFICE O/P NEW MOD 45 MIN: CPT | Mod: 25

## 2024-03-06 RX ORDER — PREDNISONE 20 MG/1
20 TABLET ORAL DAILY
Qty: 90 | Refills: 0 | Status: DISCONTINUED | COMMUNITY
Start: 2024-01-08 | End: 2024-03-06

## 2024-03-06 RX ORDER — PREDNISONE 20 MG/1
20 TABLET ORAL DAILY
Qty: 90 | Refills: 0 | Status: DISCONTINUED | COMMUNITY
Start: 2024-02-12 | End: 2024-03-06

## 2024-03-06 RX ORDER — PANTOPRAZOLE 40 MG/1
40 TABLET, DELAYED RELEASE ORAL DAILY
Qty: 30 | Refills: 0 | Status: DISCONTINUED | COMMUNITY
Start: 2024-01-08 | End: 2024-03-06

## 2024-03-06 RX ORDER — LISINOPRIL 5 MG/1
5 TABLET ORAL DAILY
Qty: 90 | Refills: 0 | Status: DISCONTINUED | COMMUNITY
Start: 2024-01-08 | End: 2024-03-06

## 2024-03-06 RX ORDER — PREDNISONE 20 MG/1
20 TABLET ORAL 3 TIMES DAILY
Qty: 21 | Refills: 0 | Status: DISCONTINUED | COMMUNITY
Start: 2024-02-12 | End: 2024-03-06

## 2024-03-06 NOTE — ASSESSMENT
[FreeTextEntry1] : 82 y/o male with HTN, hx of statin induced necrotizing myopathy who is here for evaluation of symtpoms of SOB and bilateral LE Edema that improved on lasix no echo seen in the chart will order a TTE to evaluate LV function and diastolic function  will order a pharmacological nuclear stress test to evaluate for ischemia given SOB that can be angina equivalent he used to drink beer and vodka and stopped drinking in 12/2023.   f/u in 1 month

## 2024-03-06 NOTE — HISTORY OF PRESENT ILLNESS
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 Pt initially evaluated by Dr. Jefferson while hospitalized at [facility] for acute, progressive B/L proximal UE and LE weakness for 3 weeks. Pt was on Lipitor but it was stopped 1 week prior to current hospitalization. Started on Prednisone 60mg daily, D/C'd 12/10/23. Patient referred by PCP to me for evaluation of possible CHF  dianosed because of bilateral LE edema and SOB  started on furosemide 20 mg  no exertional chest pain or  SOB, palpitations, dizziness or syncope SOB now on moderate exertion like climibing a flight of stairs

## 2024-03-16 ENCOUNTER — APPOINTMENT (OUTPATIENT)
Dept: CARDIOLOGY | Facility: CLINIC | Age: 84
End: 2024-03-16
Payer: MEDICARE

## 2024-03-16 PROCEDURE — 93306 TTE W/DOPPLER COMPLETE: CPT

## 2024-03-20 LAB
ANTI-CN-1A (NT5C1A) IBM: <20 UNITS
EJ AB SER QL: NEGATIVE
ENA JO1 AB SER IA-ACNC: <20 UNITS
ENA PM/SCL AB SER-ACNC: <20 UNITS
ENA SM+RNP AB SER IA-ACNC: <20 UNITS
ENA SS-A IGG SER QL: <20 UNITS
FIBRILLARIN AB SER QL: NEGATIVE
KU AB SER QL: NEGATIVE
MDA-5 (P140)(CADM-140): <20 UNITS
MI2 AB SER QL: NEGATIVE
NXP-2 (P140): <20 UNITS
OJ AB SER QL: NEGATIVE
PL12 AB SER QL: NEGATIVE
PL7 AB SER QL: NEGATIVE
SRP AB SERPL QL: NEGATIVE
TIF GAMMA (P155/140): <20 UNITS
U2 SNRNP AB SER QL: NEGATIVE

## 2024-03-28 ENCOUNTER — APPOINTMENT (OUTPATIENT)
Dept: CARDIOLOGY | Facility: CLINIC | Age: 84
End: 2024-03-28
Payer: MEDICARE

## 2024-03-28 PROCEDURE — 93015 CV STRESS TEST SUPVJ I&R: CPT

## 2024-03-28 PROCEDURE — 78452 HT MUSCLE IMAGE SPECT MULT: CPT

## 2024-03-28 PROCEDURE — A9502: CPT

## 2024-03-29 ENCOUNTER — TRANSCRIPTION ENCOUNTER (OUTPATIENT)
Age: 84
End: 2024-03-29

## 2024-03-30 ENCOUNTER — EMERGENCY (EMERGENCY)
Facility: HOSPITAL | Age: 84
LOS: 1 days | Discharge: HOME | End: 2024-03-30
Attending: EMERGENCY MEDICINE
Payer: MEDICARE

## 2024-03-30 VITALS
OXYGEN SATURATION: 99 % | WEIGHT: 159.61 LBS | TEMPERATURE: 98 F | HEART RATE: 66 BPM | RESPIRATION RATE: 17 BRPM | HEIGHT: 70 IN | SYSTOLIC BLOOD PRESSURE: 152 MMHG | DIASTOLIC BLOOD PRESSURE: 73 MMHG

## 2024-03-30 VITALS
TEMPERATURE: 98 F | HEART RATE: 59 BPM | DIASTOLIC BLOOD PRESSURE: 71 MMHG | OXYGEN SATURATION: 99 % | SYSTOLIC BLOOD PRESSURE: 153 MMHG | RESPIRATION RATE: 19 BRPM

## 2024-03-30 LAB
ALBUMIN SERPL ELPH-MCNC: 3.8 G/DL — SIGNIFICANT CHANGE UP (ref 3.3–5.2)
ALP SERPL-CCNC: 244 U/L — HIGH (ref 40–120)
ALT FLD-CCNC: 571 U/L — HIGH
ANION GAP SERPL CALC-SCNC: 14 MMOL/L — SIGNIFICANT CHANGE UP (ref 5–17)
AST SERPL-CCNC: 312 U/L — HIGH
BASOPHILS # BLD AUTO: 0.1 K/UL — SIGNIFICANT CHANGE UP (ref 0–0.2)
BASOPHILS NFR BLD AUTO: 0.9 % — SIGNIFICANT CHANGE UP (ref 0–2)
BILIRUB SERPL-MCNC: 0.7 MG/DL — SIGNIFICANT CHANGE UP (ref 0.4–2)
BUN SERPL-MCNC: 19.5 MG/DL — SIGNIFICANT CHANGE UP (ref 8–20)
CALCIUM SERPL-MCNC: 8.9 MG/DL — SIGNIFICANT CHANGE UP (ref 8.4–10.5)
CHLORIDE SERPL-SCNC: 101 MMOL/L — SIGNIFICANT CHANGE UP (ref 96–108)
CO2 SERPL-SCNC: 23 MMOL/L — SIGNIFICANT CHANGE UP (ref 22–29)
CREAT SERPL-MCNC: 0.53 MG/DL — SIGNIFICANT CHANGE UP (ref 0.5–1.3)
EGFR: 100 ML/MIN/1.73M2 — SIGNIFICANT CHANGE UP
EOSINOPHIL # BLD AUTO: 0.27 K/UL — SIGNIFICANT CHANGE UP (ref 0–0.5)
EOSINOPHIL NFR BLD AUTO: 2.6 % — SIGNIFICANT CHANGE UP (ref 0–6)
GIANT PLATELETS BLD QL SMEAR: PRESENT — SIGNIFICANT CHANGE UP
GLUCOSE SERPL-MCNC: 134 MG/DL — HIGH (ref 70–99)
HCT VFR BLD CALC: 42.2 % — SIGNIFICANT CHANGE UP (ref 39–50)
HGB BLD-MCNC: 14.3 G/DL — SIGNIFICANT CHANGE UP (ref 13–17)
LYMPHOCYTES # BLD AUTO: 0.18 K/UL — LOW (ref 1–3.3)
LYMPHOCYTES # BLD AUTO: 1.7 % — LOW (ref 13–44)
MANUAL SMEAR VERIFICATION: SIGNIFICANT CHANGE UP
MCHC RBC-ENTMCNC: 32.1 PG — SIGNIFICANT CHANGE UP (ref 27–34)
MCHC RBC-ENTMCNC: 33.9 GM/DL — SIGNIFICANT CHANGE UP (ref 32–36)
MCV RBC AUTO: 94.8 FL — SIGNIFICANT CHANGE UP (ref 80–100)
MONOCYTES # BLD AUTO: 0.27 K/UL — SIGNIFICANT CHANGE UP (ref 0–0.9)
MONOCYTES NFR BLD AUTO: 2.6 % — SIGNIFICANT CHANGE UP (ref 2–14)
MYELOCYTES NFR BLD: 0.9 % — HIGH (ref 0–0)
NEUTROPHILS # BLD AUTO: 9.01 K/UL — HIGH (ref 1.8–7.4)
NEUTROPHILS NFR BLD AUTO: 80.9 % — HIGH (ref 43–77)
NEUTS BAND # BLD: 4.3 % — SIGNIFICANT CHANGE UP (ref 0–8)
PLAT MORPH BLD: NORMAL — SIGNIFICANT CHANGE UP
PLATELET # BLD AUTO: 317 K/UL — SIGNIFICANT CHANGE UP (ref 150–400)
POIKILOCYTOSIS BLD QL AUTO: SLIGHT — SIGNIFICANT CHANGE UP
POLYCHROMASIA BLD QL SMEAR: SLIGHT — SIGNIFICANT CHANGE UP
POTASSIUM SERPL-MCNC: 4.3 MMOL/L — SIGNIFICANT CHANGE UP (ref 3.5–5.3)
POTASSIUM SERPL-SCNC: 4.3 MMOL/L — SIGNIFICANT CHANGE UP (ref 3.5–5.3)
PROT SERPL-MCNC: 6.6 G/DL — SIGNIFICANT CHANGE UP (ref 6.6–8.7)
RBC # BLD: 4.45 M/UL — SIGNIFICANT CHANGE UP (ref 4.2–5.8)
RBC # FLD: 14.6 % — HIGH (ref 10.3–14.5)
RBC BLD AUTO: ABNORMAL
SODIUM SERPL-SCNC: 138 MMOL/L — SIGNIFICANT CHANGE UP (ref 135–145)
VARIANT LYMPHS # BLD: 6.1 % — HIGH (ref 0–6)
WBC # BLD: 10.57 K/UL — HIGH (ref 3.8–10.5)
WBC # FLD AUTO: 10.57 K/UL — HIGH (ref 3.8–10.5)

## 2024-03-30 PROCEDURE — 99284 EMERGENCY DEPT VISIT MOD MDM: CPT

## 2024-03-30 PROCEDURE — 99285 EMERGENCY DEPT VISIT HI MDM: CPT | Mod: 25

## 2024-03-30 PROCEDURE — 71045 X-RAY EXAM CHEST 1 VIEW: CPT

## 2024-03-30 PROCEDURE — 93005 ELECTROCARDIOGRAM TRACING: CPT

## 2024-03-30 PROCEDURE — 36415 COLL VENOUS BLD VENIPUNCTURE: CPT

## 2024-03-30 PROCEDURE — 85025 COMPLETE CBC W/AUTO DIFF WBC: CPT

## 2024-03-30 PROCEDURE — 80053 COMPREHEN METABOLIC PANEL: CPT

## 2024-03-30 PROCEDURE — 96374 THER/PROPH/DIAG INJ IV PUSH: CPT

## 2024-03-30 PROCEDURE — 71045 X-RAY EXAM CHEST 1 VIEW: CPT | Mod: 26

## 2024-03-30 PROCEDURE — 93010 ELECTROCARDIOGRAM REPORT: CPT

## 2024-03-30 RX ORDER — FAMOTIDINE 10 MG/ML
20 INJECTION INTRAVENOUS ONCE
Refills: 0 | Status: COMPLETED | OUTPATIENT
Start: 2024-03-30 | End: 2024-03-30

## 2024-03-30 RX ADMIN — FAMOTIDINE 20 MILLIGRAM(S): 10 INJECTION INTRAVENOUS at 11:46

## 2024-03-30 NOTE — ED PROVIDER NOTE - OBJECTIVE STATEMENT
84 y/o M with a PMHx of HTN, GERD, Autoimmune necrotizing myopathy, Polymyositis presents with nausea. Per pt, for the last 2 days has been experiencing nausea and NBNB vomiting after taking daily medications in the morning. Per Pt Wife, pt recently started taking medications on     Endorses SOB, Chest pain    Denies dizziness, headache, vision changes, fatigue, mylagias, 84 y/o M with a PMHx of HTN, GERD, Autoimmune necrotizing myopathy, Polymyositis presents with nausea. Per pt, for the last 2 days has been experiencing nausea and NBNB vomiting after taking daily medications in the morning. Per Pt Wife, pt recently started taking medications unassisted and believes he does not eat before taking medications and believes that to be the source of nausea. Patient endorses taking all medication as prescribed and endorses eating before taking medications. No recent medications added within the last month, Azathioprine added and prednisone dose increased over a month ago. Endorses mild SOB, Chest pain. Denies dizziness, headache, vision changes, fatigue, myalgia, diarrhea, constipation.

## 2024-03-30 NOTE — ED PROVIDER NOTE - ATTENDING CONTRIBUTION TO CARE
83-year-old male with history of hypertension, GERD, polymyositis, autoimmune myopathy presents to ED with wife  complaining of increased nausea with slight amount of vomiting after taking his medications for the last 3 days.  Patient denies any associated chest pain, shortness of breath, abdominal pain, fever, chills, urinary symptoms or syncope.  Patient's wife believes patient symptoms are resulting from the fact that he is not taking his medications with food.    On exam patient awake and alert no acute distress,  PERRL, because of nares moist, neck supple, heart regular, grade 2/6 stock ejection murmur, lungs clear bilaterally, abdomen soft no localized tenderness, extremities full range of motion, neuro no focal deficits.  EKG sinus with no acute changes.  Will check labs  and reevaluate after meds

## 2024-03-30 NOTE — ED PROVIDER NOTE - PATIENT PORTAL LINK FT
You can access the FollowMyHealth Patient Portal offered by [facility] by registering at the following website: [email or web address]. By joining [employer]’s FollowMyHealth portal, you will also be able to view your health information using other applications (apps) compatible with our system.

## 2024-03-30 NOTE — ED ADULT NURSE NOTE - OBJECTIVE STATEMENT
A&Ox4, RR even and unlabored on RA. skin is warm and dry. PT coming to Ed C/O nausea after taking daily meds in the am for the past week. No nausea at this time or pain. PT reports eating while taking pills.

## 2024-03-30 NOTE — ED PROVIDER NOTE - NSICDXPASTMEDICALHX_GEN_ALL_CORE_FT
PAST MEDICAL HISTORY:  Autoimmune necrotizing myopathy     Chronic CHF     GERD (gastroesophageal reflux disease)     HLD (hyperlipidemia)     Hypertension     Polymyositis

## 2024-03-30 NOTE — ED ADULT NURSE NOTE - NSFALLUNIVINTERV_ED_ALL_ED
Bed/Stretcher in lowest position, wheels locked, appropriate side rails in place/Call bell, personal items and telephone in reach/Instruct patient to call for assistance before getting out of bed/chair/stretcher/Non-slip footwear applied when patient is off stretcher/[place] to call system/Physically safe environment - no spills, clutter or unnecessary equipment/Purposeful proactive rounding/Room/bathroom lighting operational, light cord in reach

## 2024-03-30 NOTE — ED PROVIDER NOTE - NSFOLLOWUPINSTRUCTIONS_ED_ALL_ED_FT
Please follow-up with your primary care doctor within 3 days to discuss your visit here today.     Please return to the emergency department for any new or concerning symptoms including but not limited to fever, chills, weakness, numbness, confusion, chest pain, difficulty breathing, abdominal pain, nausea, vomiting, diarrhea.     ======================================================  Nausea, Adult  Nausea is the feeling of having an upset stomach or that you are about to vomit. Nausea on its own is not usually a serious concern, but it may be an early sign of a more serious medical problem. As nausea gets worse, it can lead to vomiting. If vomiting develops, or if you are not able to drink enough fluids, you are at risk of becoming dehydrated.    Dehydration can make you tired and thirsty, cause you to have a dry mouth, and decrease how often you urinate. Older adults and people with other diseases or a weak disease-fighting system (immune system) are at higher risk for dehydration. The main goals of treating your nausea are:  To relieve your nausea.  To limit repeated nausea episodes.  To prevent vomiting and dehydration.  Follow these instructions at home:  Watch your symptoms for any changes. Tell your health care provider about them.    Eating and drinking    A bottle of clear fruit juice and glass of water.   A sign showing that a person should not drink alcohol.  Take an oral rehydration solution (ORS). This is a drink that is sold at pharmacies and retail stores.  Drink clear fluids slowly and in small amounts as you are able. Clear fluids include water, ice chips, low-calorie sports drinks, and fruit juice that has water added (diluted fruit juice).  Eat bland, easy-to-digest foods in small amounts as you are able. These foods include bananas, applesauce, rice, lean meats, toast, and crackers.  Avoid drinking fluids that contain a lot of sugar or caffeine, such as energy drinks, sports drinks, and soda.  Avoid alcohol.  Avoid spicy or fatty foods.  General instructions    Take over-the-counter and prescription medicines only as told by your health care provider.  Rest at home while you recover.  Drink enough fluid to keep your urine pale yellow.  Breathe slowly and deeply when you feel nauseous.  Avoid smelling things that have strong odors.  Wash your hands often using soap and water for at least 20 seconds. If soap and water are not available, use hand .  Make sure that everyone in your household washes their hands well and often.  Keep all follow-up visits. This is important.  Contact a health care provider if:  Your nausea gets worse.  Your nausea does not go away after two days.  You vomit multiple times.  You cannot drink fluids without vomiting.  You have any of the following:  New symptoms.  A fever.  A headache.  Muscle cramps.  A rash.  Pain while urinating.  You feel light-headed or dizzy.  Get help right away if:  You have pain in your chest, neck, arm, or jaw.  You feel extremely weak or you faint.  You have vomit that is bright red or looks like coffee grounds.  You have bloody or black stools (feces) or stools that look like tar.  You have a severe headache, a stiff neck, or both.  You have severe pain, cramping, or bloating in your abdomen.  You have difficulty breathing or are breathing very quickly.  Your heart is beating very quickly.  Your skin feels cold and clammy.  You feel confused.  You have signs of dehydration, such as:  Dark urine, very little urine, or no urine.  Cracked lips.  Dry mouth.  Sunken eyes.  Sleepiness.  Weakness.  These symptoms may be an emergency. Get help right away. Call 911.  Do not wait to see if the symptoms will go away.  Do not drive yourself to the hospital.

## 2024-03-30 NOTE — ED ADULT TRIAGE NOTE - CHIEF COMPLAINT QUOTE
Pt comes in this morning c/o that when he takes his medication in the morning that last week he gets an upset stomach. Pt has not tried calling his PCP to discuss.

## 2024-03-30 NOTE — ED PROVIDER NOTE - CLINICAL SUMMARY MEDICAL DECISION MAKING FREE TEXT BOX
82 y/o M with a PMHx of HTN, GERD, Autoimmune necrotizing myopathy, Polymyosisits presents with nausea/ NBNB vomiting of 2 days duration after taken medications. No recent medications added. Recently began to take medications unassisted and endorses take all medication with food. Per Wife, questionable if pt if taking all medications and with foot. Endorses CP, Mild SOB. Physical exam unremarkable. Likely related to gastric irritation with medication 2/2 to lack of food intake during administration. Will purse CBC, CMP, EKG, CXR. To give 1x famotidine 20mg dose. To follow up labs.

## 2024-03-31 RX ORDER — PANTOPRAZOLE SODIUM 20 MG/1
1 TABLET, DELAYED RELEASE ORAL
Qty: 14 | Refills: 0
Start: 2024-03-31 | End: 2024-04-13

## 2024-04-26 ENCOUNTER — APPOINTMENT (OUTPATIENT)
Dept: CARDIOLOGY | Facility: CLINIC | Age: 84
End: 2024-04-26
Payer: MEDICARE

## 2024-04-26 ENCOUNTER — NON-APPOINTMENT (OUTPATIENT)
Age: 84
End: 2024-04-26

## 2024-04-26 VITALS
DIASTOLIC BLOOD PRESSURE: 57 MMHG | BODY MASS INDEX: 27 KG/M2 | OXYGEN SATURATION: 96 % | TEMPERATURE: 97.4 F | HEART RATE: 71 BPM | SYSTOLIC BLOOD PRESSURE: 118 MMHG | WEIGHT: 168 LBS | HEIGHT: 66 IN

## 2024-04-26 DIAGNOSIS — T46.6X5A DRUG-INDUCED MYOPATHY: ICD-10-CM

## 2024-04-26 DIAGNOSIS — G72.0 DRUG-INDUCED MYOPATHY: ICD-10-CM

## 2024-04-26 DIAGNOSIS — R06.02 SHORTNESS OF BREATH: ICD-10-CM

## 2024-04-26 PROBLEM — G72.49 OTHER INFLAMMATORY AND IMMUNE MYOPATHIES, NOT ELSEWHERE CLASSIFIED: Chronic | Status: ACTIVE | Noted: 2024-03-30

## 2024-04-26 PROBLEM — I50.9 HEART FAILURE, UNSPECIFIED: Chronic | Status: ACTIVE | Noted: 2024-03-30

## 2024-04-26 PROBLEM — K21.9 GASTRO-ESOPHAGEAL REFLUX DISEASE WITHOUT ESOPHAGITIS: Chronic | Status: ACTIVE | Noted: 2024-03-30

## 2024-04-26 PROBLEM — M33.20 POLYMYOSITIS, ORGAN INVOLVEMENT UNSPECIFIED: Chronic | Status: ACTIVE | Noted: 2024-03-30

## 2024-04-26 PROCEDURE — 93000 ELECTROCARDIOGRAM COMPLETE: CPT

## 2024-04-26 PROCEDURE — 99213 OFFICE O/P EST LOW 20 MIN: CPT | Mod: 25

## 2024-04-26 RX ORDER — LISINOPRIL 5 MG/1
5 TABLET ORAL DAILY
Qty: 90 | Refills: 3 | Status: DISCONTINUED | COMMUNITY
Start: 2024-02-12 | End: 2024-04-26

## 2024-04-30 ENCOUNTER — RX RENEWAL (OUTPATIENT)
Age: 84
End: 2024-04-30

## 2024-05-09 RX ORDER — LISINOPRIL AND HYDROCHLOROTHIAZIDE TABLETS 20; 12.5 MG/1; MG/1
20-12.5 TABLET ORAL
Qty: 90 | Refills: 3 | Status: ACTIVE | COMMUNITY
Start: 1900-01-01 | End: 1900-01-01

## 2024-05-09 RX ORDER — FUROSEMIDE 20 MG/1
20 TABLET ORAL DAILY
Qty: 30 | Refills: 5 | Status: ACTIVE | COMMUNITY
Start: 2024-02-12 | End: 1900-01-01

## 2024-05-28 ENCOUNTER — APPOINTMENT (OUTPATIENT)
Dept: INTERNAL MEDICINE | Facility: CLINIC | Age: 84
End: 2024-05-28
Payer: MEDICARE

## 2024-05-28 VITALS
HEIGHT: 67 IN | BODY MASS INDEX: 27.31 KG/M2 | SYSTOLIC BLOOD PRESSURE: 126 MMHG | HEART RATE: 75 BPM | RESPIRATION RATE: 12 BRPM | OXYGEN SATURATION: 97 % | DIASTOLIC BLOOD PRESSURE: 62 MMHG | WEIGHT: 174 LBS

## 2024-05-28 DIAGNOSIS — M33.20 POLYMYOSITIS, ORGAN INVOLVEMENT UNSPECIFIED: ICD-10-CM

## 2024-05-28 PROCEDURE — 36415 COLL VENOUS BLD VENIPUNCTURE: CPT | Mod: GC

## 2024-05-28 PROCEDURE — G2211 COMPLEX E/M VISIT ADD ON: CPT

## 2024-05-28 PROCEDURE — 99213 OFFICE O/P EST LOW 20 MIN: CPT | Mod: GC

## 2024-05-28 NOTE — PLAN
[FreeTextEntry1] : 83 M PMHx CHF, polymyositis here for follow-up.   Polymyositis  - Patient improving  - Rheumatologist in a month - Repeat CK/CRP/ESR  - Taper prednisone if labs WNL

## 2024-05-28 NOTE — HISTORY OF PRESENT ILLNESS
[FreeTextEntry1] : Polymyositis  [de-identified] : 83 M PMHx CHF, polymyositis here for follow-up. Rheumatologist in a month, biopsy of muscle/MRI in hospital 2/28 diagnosing with polymyositis. Patient now walking w/o assistance and can drive. Still taking Prednisone 20 mg PO BID. Some SOB with walking, resolves with rest.

## 2024-05-28 NOTE — HEALTH RISK ASSESSMENT
[No] : No [No falls in past year] : Patient reported no falls in the past year [Little interest or pleasure doing things] : 1) Little interest or pleasure doing things [Feeling down, depressed, or hopeless] : 2) Feeling down, depressed, or hopeless [0] : 2) Feeling down, depressed, or hopeless: Not at all (0) [PHQ-2 Negative - No further assessment needed] : PHQ-2 Negative - No further assessment needed [[id number]] : 0

## 2024-05-28 NOTE — REVIEW OF SYSTEMS
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     HTN:  -Well controlled on current regimen  -Continue lisinopril and carvedilol      Syncope:  -No further episodes, remains off midodrine      Hyperlipidemia:  -Continue statin   -Due for repeat lipids     DVT/PE:  -Hx of RML and RLL segmental PE and bilateral DVTs in L common fem and R posterior tibial in   -Continue eliquis       The patient's previous laboratory and cardiac diagnostic testing listed below has been reviewed and was utilized to establish my current plan of care.  Previous outside notes were reviewed and taken into consideration when deciding further treatment.    Orders listed below    I personally reviewed: TTE    Discussed with: Patient and     Return to Clinic: Return in about 6 months (around 2023). Patient instructed to have all ordered testing prior to follow-up visit.     History of Present Illness:  We had the pleasure of seeing Susan Quijano in the [facility]. Thank you for allowing me to see this patient in the office. As you know, this is a 82 year old female who presents with the chief complaint of Hypertension  -No new cardiac complaints      Review of Systems:  A medically appropriate Review of Systems was performed for this visit and is negative except for HPI.     Physical Exam:  Visit Vitals  /76   Ht 5' (1.524 m)   Wt 57.2 kg  (126 lb)   BMI 24.61 kg/m²     Wt Readings from Last 4 Encounters:   06/23/23 57.2 kg (126 lb)   06/02/23 58.5 kg (129 lb)   08/01/16 62.1 kg (137 lb)   07/25/12 65.8 kg (145 lb)     Vital signs and previous weights were reviewed during today's visit.    Gen: no acute distress, AOx3  Neck: Supple, no JVP, no carotid bruit  CV: RRR, S1, S2, No G/R/M  Chest: Lungs BCTA, non-labored respirations   Ext: warm, well perfused, no LE edema    ALLERGIES:   Allergen Reactions   • Codeine Other (See Comments)     Makes me crazy,\"I'M sensitive to all narcotics\" per pt  Makes me crazy,\"I'M sensitive to all narcotics\" per pt  Makes me crazy,\"I'M sensitive to all narcotics\" per pt  Per patient \"makes her crazy\".     • Pregabalin Other (See Comments)   • Cefepime Other (See Comments)     Cefepime-induced encephalopathy  Cefepime-induced encephalopathy     • Oxycodone Other (See Comments)   • Scopolamine Other (See Comments)     Mental status changes  Causes vasovagal,confusion  Mental status changes  Mental status changes     • Sulfa Antibiotics PRURITUS       Current Medications    ATORVASTATIN (LIPITOR) 10 MG TABLET    Take 10 mg by mouth every evening.    CARVEDILOL (COREG) 6.25 MG TABLET    Take 6.25 mg by mouth 2 times daily.    DOXYCYCLINE HYCLATE (VIBRAMYCIN) 100 MG CAPSULE    100 mg  in the morning and 100 mg in the evening.    ELIQUIS 5 MG TAB    Take 5 mg by mouth in the morning and 5 mg in the evening.    HYDROCORTISONE (CORTIZONE) 2.5 % CREAM    APPLY EXTERNALLY TO THE AFFECTED AREA ON GROIN TWICE DAILY X 1-2 WEEKS THEN USE AS NEEDED    LEVOTHYROXINE 100 MCG CAPSULE    Take by mouth daily.    LIDOCAINE (LIDODERM) 5 % PATCH    lidocaine 5 % topical patch    LISINOPRIL (ZESTRIL) 5 MG TABLET    Take 1 tablet by mouth nightly.    PANTOPRAZOLE (PROTONIX) 40 MG TABLET    pantoprazole 40 mg tablet,delayed release   TAKE 1 TABLET BY MOUTH EVERY DAY    TRAZODONE (DESYREL) 50 MG TABLET    Take 50 mg by mouth.       Olimpia  Allergies and Medications were reviewed with the patient and updated during today's visit. In addition, I discussed medication dosage, usage, goals of therapy, and side effects with her.    Labs:        Invalid input(s): BMP, AGAP, FST1  Recent Labs     02/28/23  0000   WBC 6.1   RBC 3.96*   HGB 10.7*   HCT 35.3*   MCV 89.1   MCHC 30.3*   RDWCV 17.4*      TLYMPH 14*       Orders Placed During This Encounter:  No orders of the defined types were placed in this encounter.       Thank you for allowing me to see this patient in our office. Please call our office with any questions. Correspondence will be sent to your office.    Pablo Pate M.D., F.A.C.C.  [facility]  [facility] [place]   [Shortness Of Breath] : shortness of breath [Dyspnea on Exertion] : dyspnea on exertion [Negative] : Heme/Lymph [Wheezing] : no wheezing [Cough] : no cough

## 2024-05-29 LAB
CK SERPL-CCNC: 65 U/L
CRP SERPL-MCNC: <3 MG/L
ERYTHROCYTE [SEDIMENTATION RATE] IN BLOOD BY WESTERGREN METHOD: 24 MM/HR

## 2024-06-26 ENCOUNTER — APPOINTMENT (OUTPATIENT)
Dept: RHEUMATOLOGY | Facility: CLINIC | Age: 84
End: 2024-06-26

## 2024-06-26 VITALS
HEIGHT: 67 IN | OXYGEN SATURATION: 98 % | HEART RATE: 76 BPM | TEMPERATURE: 98.2 F | DIASTOLIC BLOOD PRESSURE: 86 MMHG | SYSTOLIC BLOOD PRESSURE: 126 MMHG

## 2024-06-26 DIAGNOSIS — Z79.624 LONG TERM (CURRENT) USE OF INHIBITORS OF NUCLEOTIDE SYNTHESIS: ICD-10-CM

## 2024-06-26 DIAGNOSIS — G72.49 OTHER INFLAMMATORY AND IMMUNE MYOPATHIES, NOT ELSEWHERE CLASSIFIED: ICD-10-CM

## 2024-06-26 DIAGNOSIS — Z79.52 LONG TERM (CURRENT) USE OF SYSTEMIC STEROIDS: ICD-10-CM

## 2024-06-26 LAB
ALBUMIN SERPL ELPH-MCNC: 4.4 G/DL
ALP BLD-CCNC: 70 U/L
ALT SERPL-CCNC: 20 U/L
ANION GAP SERPL CALC-SCNC: 12 MMOL/L
AST SERPL-CCNC: 17 U/L
BASOPHILS # BLD AUTO: 0.08 K/UL
BASOPHILS NFR BLD AUTO: 0.7 %
BILIRUB SERPL-MCNC: 0.6 MG/DL
BUN SERPL-MCNC: 21 MG/DL
CALCIUM SERPL-MCNC: 9.6 MG/DL
CHLORIDE SERPL-SCNC: 98 MMOL/L
CK SERPL-CCNC: 56 U/L
CO2 SERPL-SCNC: 26 MMOL/L
CREAT SERPL-MCNC: 0.8 MG/DL
CRP SERPL-MCNC: <3 MG/L
EGFR: 88 ML/MIN/1.73M2
EOSINOPHIL # BLD AUTO: 0.02 K/UL
EOSINOPHIL NFR BLD AUTO: 0.2 %
ERYTHROCYTE [SEDIMENTATION RATE] IN BLOOD BY WESTERGREN METHOD: 14 MM/HR
GLUCOSE SERPL-MCNC: 151 MG/DL
HCT VFR BLD CALC: 41.3 %
HGB BLD-MCNC: 13.7 G/DL
IMM GRANULOCYTES NFR BLD AUTO: 2.4 %
LYMPHOCYTES # BLD AUTO: 0.95 K/UL
LYMPHOCYTES NFR BLD AUTO: 8.6 %
MAN DIFF?: NORMAL
MCHC RBC-ENTMCNC: 33.2 GM/DL
MCHC RBC-ENTMCNC: 33.4 PG
MCV RBC AUTO: 100.7 FL
MONOCYTES # BLD AUTO: 0.59 K/UL
MONOCYTES NFR BLD AUTO: 5.3 %
NEUTROPHILS # BLD AUTO: 9.17 K/UL
NEUTROPHILS NFR BLD AUTO: 82.8 %
PLATELET # BLD AUTO: 336 K/UL
POTASSIUM SERPL-SCNC: 4.5 MMOL/L
PROT SERPL-MCNC: 6.8 G/DL
RBC # BLD: 4.1 M/UL
RBC # FLD: 14.8 %
SODIUM SERPL-SCNC: 136 MMOL/L
WBC # FLD AUTO: 11.08 K/UL

## 2024-06-26 PROCEDURE — 36415 COLL VENOUS BLD VENIPUNCTURE: CPT

## 2024-06-26 PROCEDURE — G2211 COMPLEX E/M VISIT ADD ON: CPT

## 2024-06-26 PROCEDURE — 99214 OFFICE O/P EST MOD 30 MIN: CPT

## 2024-06-26 RX ORDER — AZATHIOPRINE 50 1/1
50 TABLET ORAL
Qty: 60 | Refills: 4 | Status: ACTIVE | COMMUNITY
Start: 2024-02-28 | End: 1900-01-01

## 2024-06-28 LAB — HMGCR ANTIBODY, IGG: 9 UNITS

## 2024-08-24 ENCOUNTER — NON-APPOINTMENT (OUTPATIENT)
Age: 84
End: 2024-08-24

## 2024-08-28 ENCOUNTER — LABORATORY RESULT (OUTPATIENT)
Age: 84
End: 2024-08-28

## 2024-08-28 ENCOUNTER — APPOINTMENT (OUTPATIENT)
Dept: RHEUMATOLOGY | Facility: CLINIC | Age: 84
End: 2024-08-28

## 2024-08-28 VITALS
HEART RATE: 75 BPM | BODY MASS INDEX: 27.31 KG/M2 | SYSTOLIC BLOOD PRESSURE: 110 MMHG | OXYGEN SATURATION: 94 % | RESPIRATION RATE: 17 BRPM | DIASTOLIC BLOOD PRESSURE: 70 MMHG | WEIGHT: 174 LBS | TEMPERATURE: 98.2 F | HEIGHT: 67 IN

## 2024-08-28 DIAGNOSIS — G72.49 OTHER INFLAMMATORY AND IMMUNE MYOPATHIES, NOT ELSEWHERE CLASSIFIED: ICD-10-CM

## 2024-08-28 DIAGNOSIS — Z79.624 LONG TERM (CURRENT) USE OF INHIBITORS OF NUCLEOTIDE SYNTHESIS: ICD-10-CM

## 2024-08-28 DIAGNOSIS — Z79.52 LONG TERM (CURRENT) USE OF SYSTEMIC STEROIDS: ICD-10-CM

## 2024-08-28 PROCEDURE — 99214 OFFICE O/P EST MOD 30 MIN: CPT

## 2024-08-28 PROCEDURE — G2211 COMPLEX E/M VISIT ADD ON: CPT

## 2024-08-28 PROCEDURE — 36415 COLL VENOUS BLD VENIPUNCTURE: CPT

## 2024-08-28 RX ORDER — PREDNISONE 2.5 MG/1
2.5 TABLET ORAL
Qty: 60 | Refills: 0 | Status: ACTIVE | COMMUNITY
Start: 2024-08-28 | End: 1900-01-01

## 2024-08-29 ENCOUNTER — NON-APPOINTMENT (OUTPATIENT)
Age: 84
End: 2024-08-29

## 2024-08-29 LAB
ALBUMIN SERPL ELPH-MCNC: 4.2 G/DL
ALP BLD-CCNC: 86 U/L
ALT SERPL-CCNC: 17 U/L
ANION GAP SERPL CALC-SCNC: 14 MMOL/L
AST SERPL-CCNC: 22 U/L
BASOPHILS # BLD AUTO: 0.1 K/UL
BASOPHILS NFR BLD AUTO: 0.9 %
BILIRUB SERPL-MCNC: 0.5 MG/DL
BUN SERPL-MCNC: 20 MG/DL
CALCIUM SERPL-MCNC: 9.5 MG/DL
CHLORIDE SERPL-SCNC: 100 MMOL/L
CK SERPL-CCNC: 148 U/L
CO2 SERPL-SCNC: 27 MMOL/L
CREAT SERPL-MCNC: 0.79 MG/DL
CRP SERPL-MCNC: 26 MG/L
EGFR: 89 ML/MIN/1.73M2
EOSINOPHIL # BLD AUTO: 0.07 K/UL
EOSINOPHIL NFR BLD AUTO: 0.6 %
ERYTHROCYTE [SEDIMENTATION RATE] IN BLOOD BY WESTERGREN METHOD: 27 MM/HR
GLUCOSE SERPL-MCNC: 122 MG/DL
HCT VFR BLD CALC: 42.4 %
HGB BLD-MCNC: 13.1 G/DL
IMM GRANULOCYTES NFR BLD AUTO: 1.3 %
LYMPHOCYTES # BLD AUTO: 1.57 K/UL
LYMPHOCYTES NFR BLD AUTO: 13.5 %
MAN DIFF?: NORMAL
MCHC RBC-ENTMCNC: 30.9 GM/DL
MCHC RBC-ENTMCNC: 33.9 PG
MCV RBC AUTO: 109.8 FL
MONOCYTES # BLD AUTO: 0.8 K/UL
MONOCYTES NFR BLD AUTO: 6.9 %
NEUTROPHILS # BLD AUTO: 8.93 K/UL
NEUTROPHILS NFR BLD AUTO: 76.8 %
PLATELET # BLD AUTO: 392 K/UL
POTASSIUM SERPL-SCNC: 4.1 MMOL/L
PROT SERPL-MCNC: 6.9 G/DL
RBC # BLD: 3.86 M/UL
RBC # FLD: 13.8 %
SODIUM SERPL-SCNC: 140 MMOL/L
WBC # FLD AUTO: 11.62 K/UL

## 2024-08-30 ENCOUNTER — TRANSCRIPTION ENCOUNTER (OUTPATIENT)
Age: 84
End: 2024-08-30

## 2024-09-05 ENCOUNTER — NON-APPOINTMENT (OUTPATIENT)
Age: 84
End: 2024-09-05

## 2024-10-16 ENCOUNTER — APPOINTMENT (OUTPATIENT)
Dept: CARDIOLOGY | Facility: CLINIC | Age: 84
End: 2024-10-16

## 2024-11-20 ENCOUNTER — APPOINTMENT (OUTPATIENT)
Dept: RHEUMATOLOGY | Facility: CLINIC | Age: 84
End: 2024-11-20

## 2024-11-20 VITALS
HEIGHT: 67 IN | HEART RATE: 75 BPM | WEIGHT: 174 LBS | BODY MASS INDEX: 27.31 KG/M2 | TEMPERATURE: 98.3 F | OXYGEN SATURATION: 96 % | DIASTOLIC BLOOD PRESSURE: 70 MMHG | SYSTOLIC BLOOD PRESSURE: 132 MMHG

## 2024-11-20 DIAGNOSIS — G72.49 OTHER INFLAMMATORY AND IMMUNE MYOPATHIES, NOT ELSEWHERE CLASSIFIED: ICD-10-CM

## 2024-11-20 DIAGNOSIS — T46.6X5A DRUG-INDUCED MYOPATHY: ICD-10-CM

## 2024-11-20 DIAGNOSIS — G72.0 DRUG-INDUCED MYOPATHY: ICD-10-CM

## 2024-11-20 DIAGNOSIS — Z79.52 LONG TERM (CURRENT) USE OF SYSTEMIC STEROIDS: ICD-10-CM

## 2024-11-20 DIAGNOSIS — Z79.624 LONG TERM (CURRENT) USE OF INHIBITORS OF NUCLEOTIDE SYNTHESIS: ICD-10-CM

## 2024-11-20 DIAGNOSIS — M33.20 POLYMYOSITIS, ORGAN INVOLVEMENT UNSPECIFIED: ICD-10-CM

## 2024-11-20 PROCEDURE — G2211 COMPLEX E/M VISIT ADD ON: CPT

## 2024-11-20 PROCEDURE — 99214 OFFICE O/P EST MOD 30 MIN: CPT

## 2024-11-20 PROCEDURE — 36415 COLL VENOUS BLD VENIPUNCTURE: CPT

## 2024-11-21 LAB
ALBUMIN SERPL ELPH-MCNC: 4 G/DL
ALP BLD-CCNC: 100 U/L
ALT SERPL-CCNC: 17 U/L
ANION GAP SERPL CALC-SCNC: 12 MMOL/L
AST SERPL-CCNC: 23 U/L
BASOPHILS # BLD AUTO: 0.15 K/UL
BASOPHILS NFR BLD AUTO: 2.3 %
BILIRUB SERPL-MCNC: 0.5 MG/DL
BUN SERPL-MCNC: 10 MG/DL
CALCIUM SERPL-MCNC: 9.6 MG/DL
CHLORIDE SERPL-SCNC: 99 MMOL/L
CHOLEST SERPL-MCNC: 166 MG/DL
CK SERPL-CCNC: 88 U/L
CO2 SERPL-SCNC: 26 MMOL/L
CREAT SERPL-MCNC: 0.71 MG/DL
CRP SERPL-MCNC: <3 MG/L
EGFR: 91 ML/MIN/1.73M2
EOSINOPHIL # BLD AUTO: 0.43 K/UL
EOSINOPHIL NFR BLD AUTO: 6.6 %
ERYTHROCYTE [SEDIMENTATION RATE] IN BLOOD BY WESTERGREN METHOD: 25 MM/HR
GLUCOSE SERPL-MCNC: 146 MG/DL
HCT VFR BLD CALC: 41.6 %
HDLC SERPL-MCNC: 62 MG/DL
HGB BLD-MCNC: 14.1 G/DL
IMM GRANULOCYTES NFR BLD AUTO: 0.3 %
LDH SERPL-CCNC: 193 U/L
LDLC SERPL CALC-MCNC: 90 MG/DL
LYMPHOCYTES # BLD AUTO: 1.54 K/UL
LYMPHOCYTES NFR BLD AUTO: 23.7 %
MAN DIFF?: NORMAL
MCHC RBC-ENTMCNC: 33 PG
MCHC RBC-ENTMCNC: 33.9 G/DL
MCV RBC AUTO: 97.4 FL
MONOCYTES # BLD AUTO: 0.84 K/UL
MONOCYTES NFR BLD AUTO: 12.9 %
MYOGLOBIN SERPL-MCNC: 40 NG/ML
NEUTROPHILS # BLD AUTO: 3.51 K/UL
NEUTROPHILS NFR BLD AUTO: 54.2 %
NONHDLC SERPL-MCNC: 103 MG/DL
PLATELET # BLD AUTO: 323 K/UL
POTASSIUM SERPL-SCNC: 4.3 MMOL/L
PROT SERPL-MCNC: 6.8 G/DL
RBC # BLD: 4.27 M/UL
RBC # FLD: 14 %
SODIUM SERPL-SCNC: 137 MMOL/L
TRIGL SERPL-MCNC: 72 MG/DL
WBC # FLD AUTO: 6.49 K/UL

## 2024-11-23 LAB — HMGCR ANTIBODY, IGG: 20 UNITS

## 2024-11-25 NOTE — ED ADULT NURSE NOTE - DOES PATIENT HAVE ADVANCE DIRECTIVE
"----- Message from Sonia sent at 11/25/2024  8:55 AM CST -----  Regarding: FW: colonoscopy    ----- Message -----  From: Hilda Hansen MD  Sent: 11/24/2024   9:20 AM CST  To: [facility]  Subject: colonoscopy                                      Procedure: Colonoscopy    Diagnosis: Rectal bleeding    Procedure Timing: Within 4 weeks (Urgent)    #If within 4 weeks selected, please kasia as high priority#    #If greater than 12 weeks, please select "5-12 weeks" and delay sending until 3 months prior to requested date#     Location: [facility] with me    Additional Scheduling Information: No scheduling concerns    Prep Specifications:Standard prep    Is the patient taking a GLP-1 Agonist:no    Have you attached a patient to this message: yes  " Yes

## 2024-11-27 LAB
CK BB SERPL ELPH-CCNC: 0 % (ref 0–?)
CK MB CFR SERPL ELPH: 0 %
CK MM SERPL ELPH-CCNC: 100 %
CREATINE KINASE,TOTAL,SERUM: 104 U/L
MACRO TYPE 1: 0 %
MACRO TYPE 2: 0 %

## 2024-12-04 NOTE — PROGRESS NOTE ADULT - ASSESSMENT
Case Management Assessment & Discharge Planning Note    Patient name Lakeisha Langston  Location /-01 MRN [id number]  : 1936 Date 2024       Current Admission Date: 12/3/2024  Current Admission Diagnosis:Ambulatory dysfunction   Patient Active Problem List    Diagnosis Date Noted Date Diagnosed    COVID-19 2024     Weakness 2024     Ambulatory dysfunction 2024     Left-sided back pain 08/15/2024     Depression, recurrent (HCC) 2023     Stage 3b chronic kidney disease (HCC) 02/10/2022     Chronic diastolic CHF (congestive heart failure) ([facility]) 2022     Left knee injury 2021     Ankle swelling 2021     B12 deficiency 2021     Neuropathy 2021     Syncope 2021     Hiatal hernia 2020     Eustachian tube dysfunction, bilateral 2020     Tension-type headache, not intractable 2020     Closed fracture of tenth thoracic vertebra with routine healing 10/16/2019     Fall as cause of accidental injury in home as place of occurrence 10/16/2019     Age-related osteoporosis without current pathological fracture 2019     Mild intermittent asthma without complication 2019     Impaired fasting glucose 2018     Venous insufficiency 2018     AVM (arteriovenous malformation) 2018     Chest heaviness 2018     Anxiety 2018     Abnormal EKG 2018     Iron deficiency anemia 2016     Atrophic vaginitis 2016     GERD without esophagitis 2014     Vitamin D deficiency 2014     Irritable bowel syndrome 2014     Hypothyroidism 2014     Hypertension 2014     Hyperlipidemia 2014     Benign familial tremor 2014     Dyslipidemia, goal to be determined 2007     Generalized anxiety disorder 2007     Osteoporosis 2007       LOS (days): 0  Geometric Mean LOS (GMLOS) (days):   Days to GMLOS:     OBJECTIVE:  PATIENT READMITTED TO  HOSPITAL        Current admission status: Inpatient       Preferred Pharmacy:   [id number] - [place] - [address]  [address]  [facility] [address]  Phone: [phone] Fax: [phone]    Primary Care Provider: Ezekiel Vargas MD    Primary Insurance: MEDICARE  Secondary Insurance: AARP    ASSESSMENT:  Active Health Care Proxies       Katerine Curry Health Care Agent - Daughter   Primary Phone: [phone] (Mobile)                 Advance Directives  Does patient have a Health Care POA?: Yes  Does patient have Advance Directives?: Yes  Advance Directives: Power of  for health care, Power of  for finance  Primary Contact: Patient's Daughter (Katerine)    Readmission Root Cause  30 Day Readmission: Yes  During your hospital stay, did someone (provider, nurse, ) explain your care to you in a way you could understand?: Yes  Did you feel medically stable to leave the hospital?: Yes  Were you able to pay for your medication at the pharmacy?: No (Patient's daughter reported that she went to the pharmacy, but no medications were there for .)  Did you have reliable transportation to take you to your appointments?: Yes  During previous admission, was a post-acute recommendation made?: Yes  What post-acute resources were offered?: Offered, but declined, STR  Patient was readmitted due to: ambulatory dysfunction  Action Plan: discharge to [facility] once medically stable.    Patient Information  Admitted from:: Home  Mental Status: Alert  During Assessment patient was accompanied by: Daughter  Assessment information provided by:: Daughter  Primary Caregiver: Family  Caregiver's Name:: Katerine  Caregiver's Relationship to Patient:: Family Member  Caregiver's Telephone Number:: [phone]  Support Systems: Self, Daughter  County of Residence: [place]  What city do you live in?: [place]  Home entry access options. Select all that apply.: Stairs  Number of  steps to enter home.: 3  Do the steps have railings?: Yes  Type of Current Residence: [facility]  Living Arrangements: Lives w/ Daughter  Is patient a ?: No    Activities of Daily Living Prior to Admission  Functional Status: Assistance  Completes ADLs independently?: No  Level of ADL dependence: Assistance  Ambulates independently?: No  Level of ambulatory dependence: Assistance  Does patient use assisted devices?: Yes  Assisted Devices (DME) used: Walker, Straight Cane, Shower Chair  Does patient currently own DME?: Yes  What DME does the patient currently own?: Shower Chair, Walker, Straight Cane  Does patient have a history of Outpatient Therapy (PT/OT)?: No  Does the patient have a history of Short-Term Rehab?: No  Does patient have a history of HHC?: Yes ([facility])  Does patient currently have HHC?: Yes    Current Home Health Care  Type of Current Home Care Services: Home PT, Home OT, Nurse visit  Home Health Agency Name:: [facility]  Current Home Health Follow-Up Provider:: PCP    Patient Information Continued  Income Source: SSI/SSD  Does patient have prescription coverage?: Yes  Does patient receive dialysis treatments?: No  Does patient have a history of substance abuse?: No  Does patient have a history of Mental Health Diagnosis?: No    Means of Transportation  Means of Transport to Appts:: Family transport    DISCHARGE DETAILS:    Discharge planning discussed with:: Patient's Daughter  Freedom of Choice: Yes  Comments - Freedom of Choice: CM spoke with patient's daughter via phone and introduced self and role. CM discussed FOC as it pertains to discharge planning. Level II PT/OT Recommendation reviewed, and patient and daughter are agreeable. They denied any preferences as to where they wish to go and are agreeable with CM sending a blanket referral.  CM contacted family/caregiver?: Yes  Were Treatment Team discharge recommendations reviewed with patient/caregiver?: Yes  Did patient/caregiver  verbalize understanding of patient care needs?: N/A- going to facility  Were patient/caregiver advised of the risks associated with not following Treatment Team discharge recommendations?: Yes    Contacts  Patient Contacts: Katerine  Relationship to Patient:: Family  Contact Method: Phone  Phone Number: [phone]  Reason/Outcome: Continuity of Care, Discharge Planning    Requested Home Health Care         Is the patient interested in HHC at discharge?: No  Home Health Agency Name:: [facility]    DME Referral Provided  Referral made for DME?: No    Other Referral/Resources/Interventions Provided:  Interventions: Short Term Rehab  Referral Comments: CM sent a blanket referral for STR via [facility] to determine who can accept.    Would you like to participate in our [facility] service program?  : No - Declined    Treatment Team Recommendation: Short Term Rehab  Discharge Destination Plan:: Short Term Rehab  Transport at Discharge : Stephenie rey     84yo M w/ hx of HTN and HL presents with progressive muscle weakness and labs/imaging findings concerning for polymyositis. Given recent code sepsis, WBC 17, and concern for developing PNA, will hold off on OR scheduling until pt is optimized from an infection standpoint.     Plan:  - will trend fever curve, WBC  - f/u blood cultures  - monitor infectious symptoms  - continue to plan for muscle biopsy once pt is optimized  - general surgery will continue to follow  - Rest of care per primary team 82yo M w/ hx of HTN and HL presents with progressive muscle weakness and labs/imaging findings concerning for polymyositis. Given recent code sepsis, WBC 17, and concern for developing PNA, will hold off on OR scheduling until pt is optimized from an infection standpoint.     Plan:  - will trend fever curve, WBC  - f/u blood cultures  - monitor infectious symptoms  - continue to plan for muscle biopsy once pt is optimized  - general surgery will continue to follow  - Rest of care per primary team

## 2025-02-19 ENCOUNTER — APPOINTMENT (OUTPATIENT)
Dept: RHEUMATOLOGY | Facility: CLINIC | Age: 85
End: 2025-02-19
Payer: MEDICARE

## 2025-02-19 ENCOUNTER — NON-APPOINTMENT (OUTPATIENT)
Age: 85
End: 2025-02-19

## 2025-02-19 VITALS
RESPIRATION RATE: 17 BRPM | SYSTOLIC BLOOD PRESSURE: 154 MMHG | WEIGHT: 175 LBS | HEART RATE: 72 BPM | BODY MASS INDEX: 27.47 KG/M2 | OXYGEN SATURATION: 97 % | DIASTOLIC BLOOD PRESSURE: 74 MMHG | TEMPERATURE: 98.7 F | HEIGHT: 67 IN

## 2025-02-19 DIAGNOSIS — G72.0 DRUG-INDUCED MYOPATHY: ICD-10-CM

## 2025-02-19 DIAGNOSIS — Z79.624 LONG TERM (CURRENT) USE OF INHIBITORS OF NUCLEOTIDE SYNTHESIS: ICD-10-CM

## 2025-02-19 DIAGNOSIS — T46.6X5A DRUG-INDUCED MYOPATHY: ICD-10-CM

## 2025-02-19 PROCEDURE — G2211 COMPLEX E/M VISIT ADD ON: CPT

## 2025-02-19 PROCEDURE — 36415 COLL VENOUS BLD VENIPUNCTURE: CPT

## 2025-02-19 PROCEDURE — 99214 OFFICE O/P EST MOD 30 MIN: CPT

## 2025-02-20 LAB
ALBUMIN SERPL ELPH-MCNC: 4 G/DL
ALP BLD-CCNC: 151 U/L
ALT SERPL-CCNC: 13 U/L
ANION GAP SERPL CALC-SCNC: 11 MMOL/L
AST SERPL-CCNC: 21 U/L
BASOPHILS # BLD AUTO: 0.13 K/UL
BASOPHILS NFR BLD AUTO: 2.1 %
BILIRUB SERPL-MCNC: 0.4 MG/DL
BUN SERPL-MCNC: 12 MG/DL
CALCIUM SERPL-MCNC: 9.6 MG/DL
CHLORIDE SERPL-SCNC: 98 MMOL/L
CK SERPL-CCNC: 65 U/L
CO2 SERPL-SCNC: 26 MMOL/L
CREAT SERPL-MCNC: 0.67 MG/DL
CRP SERPL-MCNC: <3 MG/L
EGFR: 93 ML/MIN/1.73M2
EOSINOPHIL # BLD AUTO: 0.42 K/UL
EOSINOPHIL NFR BLD AUTO: 6.7 %
ERYTHROCYTE [SEDIMENTATION RATE] IN BLOOD BY WESTERGREN METHOD: 20 MM/HR
GLUCOSE SERPL-MCNC: 110 MG/DL
HCT VFR BLD CALC: 38 %
HGB BLD-MCNC: 13.3 G/DL
IMM GRANULOCYTES NFR BLD AUTO: 0.6 %
LYMPHOCYTES # BLD AUTO: 1.89 K/UL
LYMPHOCYTES NFR BLD AUTO: 30 %
MAN DIFF?: NORMAL
MCHC RBC-ENTMCNC: 33.8 PG
MCHC RBC-ENTMCNC: 35 G/DL
MCV RBC AUTO: 96.4 FL
MONOCYTES # BLD AUTO: 0.82 K/UL
MONOCYTES NFR BLD AUTO: 13 %
NEUTROPHILS # BLD AUTO: 2.99 K/UL
NEUTROPHILS NFR BLD AUTO: 47.6 %
PLATELET # BLD AUTO: 304 K/UL
POTASSIUM SERPL-SCNC: 4.7 MMOL/L
PROT SERPL-MCNC: 6.9 G/DL
RBC # BLD: 3.94 M/UL
RBC # FLD: 14.7 %
SODIUM SERPL-SCNC: 135 MMOL/L
WBC # FLD AUTO: 6.29 K/UL

## 2025-02-27 ENCOUNTER — NON-APPOINTMENT (OUTPATIENT)
Age: 85
End: 2025-02-27

## 2025-02-27 ENCOUNTER — APPOINTMENT (OUTPATIENT)
Dept: INTERNAL MEDICINE | Facility: CLINIC | Age: 85
End: 2025-02-27
Payer: MEDICARE

## 2025-02-27 VITALS
RESPIRATION RATE: 12 BRPM | HEART RATE: 78 BPM | SYSTOLIC BLOOD PRESSURE: 132 MMHG | BODY MASS INDEX: 27.47 KG/M2 | HEIGHT: 67 IN | DIASTOLIC BLOOD PRESSURE: 82 MMHG | WEIGHT: 175 LBS

## 2025-02-27 DIAGNOSIS — L85.3 XEROSIS CUTIS: ICD-10-CM

## 2025-02-27 DIAGNOSIS — G72.49 OTHER INFLAMMATORY AND IMMUNE MYOPATHIES, NOT ELSEWHERE CLASSIFIED: ICD-10-CM

## 2025-02-27 DIAGNOSIS — Z00.00 ENCOUNTER FOR GENERAL ADULT MEDICAL EXAMINATION W/OUT ABNORMAL FINDINGS: ICD-10-CM

## 2025-02-27 DIAGNOSIS — Z23 ENCOUNTER FOR IMMUNIZATION: ICD-10-CM

## 2025-02-27 DIAGNOSIS — M33.20 POLYMYOSITIS, ORGAN INVOLVEMENT UNSPECIFIED: ICD-10-CM

## 2025-02-27 LAB — HMGCR ANTIBODY, IGG: 24 UNITS

## 2025-02-27 PROCEDURE — 90471 IMMUNIZATION ADMIN: CPT

## 2025-02-27 PROCEDURE — 36415 COLL VENOUS BLD VENIPUNCTURE: CPT

## 2025-02-27 PROCEDURE — 90715 TDAP VACCINE 7 YRS/> IM: CPT

## 2025-02-27 PROCEDURE — G0439: CPT

## 2025-02-27 PROCEDURE — G0009: CPT | Mod: 59

## 2025-02-27 PROCEDURE — 90677 PCV20 VACCINE IM: CPT

## 2025-02-27 PROCEDURE — 93000 ELECTROCARDIOGRAM COMPLETE: CPT

## 2025-02-27 RX ORDER — AMMONIUM LACTATE 12 %
12 CREAM (GRAM) TOPICAL TWICE DAILY
Qty: 1 | Refills: 3 | Status: ACTIVE | COMMUNITY
Start: 2025-02-27 | End: 1900-01-01

## 2025-02-28 LAB
CHOLEST SERPL-MCNC: 156 MG/DL
ESTIMATED AVERAGE GLUCOSE: 111 MG/DL
HBA1C MFR BLD HPLC: 5.5 %
HDLC SERPL-MCNC: 64 MG/DL
LDLC SERPL CALC-MCNC: 78 MG/DL
NONHDLC SERPL-MCNC: 93 MG/DL
PSA SERPL-MCNC: 2.19 NG/ML
T3FREE SERPL-MCNC: 3.3 PG/ML
T4 FREE SERPL-MCNC: 1.2 NG/DL
TRIGL SERPL-MCNC: 74 MG/DL
TSH SERPL-ACNC: 1.64 UIU/ML

## 2025-03-01 LAB
APPEARANCE: CLEAR
BACTERIA: NEGATIVE /HPF
BILIRUBIN URINE: NEGATIVE
BLOOD URINE: NEGATIVE
CAST: 0 /LPF
COLOR: YELLOW
EPITHELIAL CELLS: 0 /HPF
GLUCOSE QUALITATIVE U: NEGATIVE MG/DL
KETONES URINE: NEGATIVE MG/DL
LEUKOCYTE ESTERASE URINE: NEGATIVE
MICROSCOPIC-UA: NORMAL
NITRITE URINE: NEGATIVE
PH URINE: 6.5
PROTEIN URINE: NEGATIVE MG/DL
RED BLOOD CELLS URINE: 1 /HPF
SPECIFIC GRAVITY URINE: 1.01
UROBILINOGEN URINE: 0.2 MG/DL
WHITE BLOOD CELLS URINE: 0 /HPF

## 2025-03-19 ENCOUNTER — OFFICE (OUTPATIENT)
Dept: URBAN - METROPOLITAN AREA CLINIC 112 | Facility: CLINIC | Age: 85
Setting detail: OPHTHALMOLOGY
End: 2025-03-19
Payer: MEDICARE

## 2025-03-19 DIAGNOSIS — H26.492: ICD-10-CM

## 2025-03-19 DIAGNOSIS — Z96.1: ICD-10-CM

## 2025-03-19 DIAGNOSIS — H35.3132: ICD-10-CM

## 2025-03-19 DIAGNOSIS — H26.491: ICD-10-CM

## 2025-03-19 PROCEDURE — 92250 FUNDUS PHOTOGRAPHY W/I&R: CPT | Performed by: OPHTHALMOLOGY

## 2025-03-19 PROCEDURE — 92014 COMPRE OPH EXAM EST PT 1/>: CPT | Performed by: OPHTHALMOLOGY

## 2025-03-19 ASSESSMENT — REFRACTION_MANIFEST
OD_ADD: +3.25
OD_VA1: 20/40-
OD_CYLINDER: -0.75
OD_AXIS: 90
OS_ADD: +3.25
OD_SPHERE: PL
OS_AXIS: 85
OS_SPHERE: -0.25
OS_CYLINDER: -0.75
OS_VA1: 20/50-

## 2025-03-19 ASSESSMENT — REFRACTION_CURRENTRX
OD_OVR_VA: 20/
OS_SPHERE: +2.25
OD_AXIS: 41
OD_CYLINDER: -0.25
OD_SPHERE: +4.25
OS_OVR_VA: 20/
OD_OVR_VA: 20/
OD_SPHERE: +2.25
OS_SPHERE: +4.25
OS_OVR_VA: 20/

## 2025-03-19 ASSESSMENT — REFRACTION_AUTOREFRACTION
OS_SPHERE: -0.50
OD_CYLINDER: -1.00
OS_CYLINDER: -1.50
OS_AXIS: 086
OD_AXIS: 090
OD_SPHERE: -0.25

## 2025-03-19 ASSESSMENT — TONOMETRY
OD_IOP_MMHG: 18
OS_IOP_MMHG: 19

## 2025-03-19 ASSESSMENT — KERATOMETRY
OD_K2POWER_DIOPTERS: 45.00
OS_AXISANGLE_DEGREES: 156
OD_AXISANGLE_DEGREES: 171
OD_K1POWER_DIOPTERS: 44.25
METHOD_AUTO_MANUAL: AUTO
OS_K2POWER_DIOPTERS: 44.75
OS_K1POWER_DIOPTERS: 44.50

## 2025-03-19 ASSESSMENT — CONFRONTATIONAL VISUAL FIELD TEST (CVF)
OS_FINDINGS: FULL
OD_FINDINGS: FULL

## 2025-03-19 ASSESSMENT — VISUAL ACUITY
OS_BCVA: 20/40
OD_BCVA: 20/40

## 2025-03-25 ENCOUNTER — EMERGENCY (EMERGENCY)
Facility: HOSPITAL | Age: 85
LOS: 1 days | Discharge: HOME | End: 2025-03-25
Attending: EMERGENCY MEDICINE
Payer: MEDICARE

## 2025-03-25 VITALS
WEIGHT: 166.67 LBS | HEART RATE: 74 BPM | OXYGEN SATURATION: 97 % | RESPIRATION RATE: 17 BRPM | TEMPERATURE: 98 F | SYSTOLIC BLOOD PRESSURE: 170 MMHG | DIASTOLIC BLOOD PRESSURE: 92 MMHG

## 2025-03-25 LAB
ANION GAP SERPL CALC-SCNC: 11 MMOL/L — SIGNIFICANT CHANGE UP (ref 5–17)
BUN SERPL-MCNC: 12.4 MG/DL — SIGNIFICANT CHANGE UP (ref 8–20)
CALCIUM SERPL-MCNC: 9 MG/DL — SIGNIFICANT CHANGE UP (ref 8.4–10.5)
CO2 SERPL-SCNC: 24 MMOL/L — SIGNIFICANT CHANGE UP (ref 22–29)
CREAT SERPL-MCNC: 0.53 MG/DL — SIGNIFICANT CHANGE UP (ref 0.5–1.3)
EGFR: 99 ML/MIN/1.73M2 — SIGNIFICANT CHANGE UP
EGFR: 99 ML/MIN/1.73M2 — SIGNIFICANT CHANGE UP
FOLATE SERPL-MCNC: >20 NG/ML — SIGNIFICANT CHANGE UP
GLUCOSE SERPL-MCNC: 94 MG/DL — SIGNIFICANT CHANGE UP (ref 70–99)
HCT VFR BLD CALC: 37.8 % — LOW (ref 39–50)
HGB BLD-MCNC: 13.5 G/DL — SIGNIFICANT CHANGE UP (ref 13–17)
MAGNESIUM SERPL-MCNC: 2 MG/DL — SIGNIFICANT CHANGE UP (ref 1.6–2.6)
MCHC RBC-ENTMCNC: 34.4 PG — HIGH (ref 27–34)
MCHC RBC-ENTMCNC: 35.7 G/DL — SIGNIFICANT CHANGE UP (ref 32–36)
MCV RBC AUTO: 96.2 FL — SIGNIFICANT CHANGE UP (ref 80–100)
NRBC # BLD AUTO: 0 K/UL — SIGNIFICANT CHANGE UP (ref 0–0)
NRBC # FLD: 0 K/UL — SIGNIFICANT CHANGE UP (ref 0–0)
NRBC BLD AUTO-RTO: 0 /100 WBCS — SIGNIFICANT CHANGE UP (ref 0–0)
PHOSPHATE SERPL-MCNC: 3.4 MG/DL — SIGNIFICANT CHANGE UP (ref 2.4–4.7)
POTASSIUM SERPL-MCNC: 4.4 MMOL/L — SIGNIFICANT CHANGE UP (ref 3.5–5.3)
POTASSIUM SERPL-SCNC: 4.4 MMOL/L — SIGNIFICANT CHANGE UP (ref 3.5–5.3)
RBC # BLD: 3.93 M/UL — LOW (ref 4.2–5.8)
RBC # FLD: 13.3 % — SIGNIFICANT CHANGE UP (ref 10.3–14.5)
SODIUM SERPL-SCNC: 133 MMOL/L — LOW (ref 135–145)
TSH SERPL-MCNC: 1.51 UIU/ML — SIGNIFICANT CHANGE UP (ref 0.27–4.2)
VIT B12 SERPL-MCNC: 543 PG/ML — SIGNIFICANT CHANGE UP (ref 232–1245)
WBC # BLD: 8.96 K/UL — SIGNIFICANT CHANGE UP (ref 3.8–10.5)
WBC # FLD AUTO: 8.96 K/UL — SIGNIFICANT CHANGE UP (ref 3.8–10.5)

## 2025-03-25 PROCEDURE — 72125 CT NECK SPINE W/O DYE: CPT | Mod: 26

## 2025-03-25 PROCEDURE — 99223 1ST HOSP IP/OBS HIGH 75: CPT

## 2025-03-25 PROCEDURE — 70450 CT HEAD/BRAIN W/O DYE: CPT | Mod: 26

## 2025-03-25 RX ORDER — LISINOPRIL 5 MG/1
20 TABLET ORAL DAILY
Refills: 0 | Status: DISCONTINUED | OUTPATIENT
Start: 2025-03-25 | End: 2025-04-01

## 2025-03-25 RX ORDER — FOLIC ACID 1 MG/1
1 TABLET ORAL DAILY
Refills: 0 | Status: DISCONTINUED | OUTPATIENT
Start: 2025-03-25 | End: 2025-04-01

## 2025-03-25 RX ORDER — AZATHIOPRINE 50 MG/1
50 TABLET ORAL DAILY
Refills: 0 | Status: DISCONTINUED | OUTPATIENT
Start: 2025-03-25 | End: 2025-04-01

## 2025-03-25 RX ORDER — ACETAMINOPHEN 500 MG/5ML
650 LIQUID (ML) ORAL EVERY 6 HOURS
Refills: 0 | Status: DISCONTINUED | OUTPATIENT
Start: 2025-03-25 | End: 2025-04-01

## 2025-03-25 RX ORDER — AZATHIOPRINE 50 MG/1
1 TABLET ORAL
Refills: 0 | DISCHARGE

## 2025-03-25 RX ORDER — LISINOPRIL AND HYDROCHLOROTHIAZIDE 12.5; 2 MG/1; MG/1
1 TABLET ORAL
Refills: 0 | DISCHARGE

## 2025-03-25 NOTE — ED CDU PROVIDER INITIAL DAY NOTE - PHYSICAL EXAMINATION
Gen: No acute distress, non toxic  Head: NCAT  Eyes: pink conjunctivae, EOMI, PERRL  Neuro: A&O x 3, CN II-XII intact, sensorimotor intact without deficits, 5/5 str ext x 4. subjective decreased sensation to finger tips of b/l hands.   MSK: No spine or joint tenderness to palpation, Full ROM ext x 4  Vasc: Radial pulses 2+ b/l, cap refill < 2sec  Skin: No rashes. intact and perfused.

## 2025-03-25 NOTE — ED CDU PROVIDER INITIAL DAY NOTE - PROGRESS NOTE DETAILS
Received during sign out. Patient in NAD. A&Ox3. MAEx4. Pending MRI. Patient updated. No complaints.

## 2025-03-25 NOTE — ED PROVIDER NOTE - CLINICAL SUMMARY MEDICAL DECISION MAKING FREE TEXT BOX
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 Symptoms are affecting patient ADLs likely getting dressed and tying shoes. Have progressed over past month. Labs with mild hyponatremia, likely noncontributory, other electrolytes wnl. CK normal. CT head and cervical spine without acute findings. Will need MRI, patient to go to observation pending imaging. Discussed case with Dr. Pedro. Less likely related to myopathy, or azathioprine. Bilateral numbness commonly presents with cervical pathology. Possibly new autoimmune condition but less likely.

## 2025-03-25 NOTE — ED ADULT TRIAGE NOTE - CHIEF COMPLAINT QUOTE
bilateral hand numbness/weakness x 2-3 weeks hx IMNM, on imuran. denies further neurological deficits or symptoms.

## 2025-03-25 NOTE — ED ADULT NURSE REASSESSMENT NOTE - NS ED NURSE REASSESS COMMENT FT1
Pt received from ED at 1900. Patient seen and assessed at bedside. Patient is A&Ox4, neurologically intact, no deficits, independently ambulates in hallway, NAD. Pt denies pain/CP/SOB/N/V/D. PIV noted, intact, WNL. Plan of care reviewed-awaiting MR C-spine and Head for new onset b/l hand numbness and tingling. Call bell within reach. Safety maintained.

## 2025-03-25 NOTE — ED CDU PROVIDER INITIAL DAY NOTE - CLINICAL SUMMARY MEDICAL DECISION MAKING FREE TEXT BOX
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 Symptoms are affecting patient's ADLs like getting dressed and tying shoes. Symptoms have progressed over past month. Labs with mild hyponatremia, likely noncontributory, other electrolytes wnl. CK normal. CT head and cervical spine without acute findings. Pt recently had azathioprine dose decreased from 100mg to 50mg daily. Pt placed in obs for MRI head and c-spine. ED doctor discussed case with Dr. Pedro - Less likely related to myopathy or azathioprine.

## 2025-03-25 NOTE — ED ADULT NURSE REASSESSMENT NOTE - NS ED NURSE REASSESS COMMENT FT1
Assumed care of pt at 1308 as stated in report from RN . Charting as noted. Patient A&O x4, denies pain/discomfort, denies CP/SOB. Updated on the plan of care. Call bell within reach, bed locked in lowest position. IV site flushed w/ NS. No redness, swelling or pain noted to site. No signs of acute distress noted, safety maintained.

## 2025-03-25 NOTE — ED CDU PROVIDER INITIAL DAY NOTE - OBJECTIVE STATEMENT
83yo M PMHx HTN, HLD, autoimmune necrotizing myopathy on azathioprine (follows Dr. Pedro) presented to ED with bilateral finger numbness for 3 weeks. Reports numbness started in right finger tips and has progressed to fingers of b/l hands. Patient reports difficulty buttoning his shirts and tying his shoes. Denies weakness, joint pain, joint swelling, injury, trauma, neck pain, fever, chills, headache, dizziness, cp, sob. Pt recently had azathioprine dose decreased from 100mg to 50mg daily. ED labs unremarkable other than mild hyponatremia. CT head and c-spine negative. Pt placed in obs for MRI head and c-spine.

## 2025-03-25 NOTE — ED PROVIDER NOTE - PHYSICAL EXAMINATION
Gen: No acute distress, comfortably in bed  HENT: Atraumatic, normocephalic  Eyes: PERRLA, conjunctivae are clear  NEURO: A&Ox3, no focal deficits, moving all extremities spontaneously, CN II-XII grossly intact, strength intact  Resp: CTAB, no wrr, non-labored breathing  Cardio: Regular rate and rhythm, S1/S2 heard, no murmurs, gallops or rubs  Abdominal: Soft, non-tender, non-distended, no appreciable masses, normoactive bowel sounds  : No CVA tenderness  Extremities: Nontender, no clubbing, cyanosis, or edema  Skin: Warm, dry, intact without rashes or lesions

## 2025-03-25 NOTE — ED PROVIDER NOTE - ATTENDING CONTRIBUTION TO CARE
Pt states that for the past several weeks he has had worsening numbness in B/L fingers. Pt states that it is too the point that he is having trouble with dextrous tasks like tying shoes and buttoning buttons. Pt states that he called his rheumatologist dr. poole who advised neuro f/up as this was not related to the azathioprine or his auto-immune myopathy.    physical - rrr. ctab. abd - soft, nt. no edema. no rash. neuro - strength 5/5 x4. sensation intact x4. good  strength B/L.  diminished sensation to fingers.  CN II-XII intact. normal gait.    plan - labs and iamging reviewed. 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 case d/w dr. melchor who aric ccept to obs for mri.

## 2025-03-25 NOTE — ED ADULT NURSE NOTE - OBJECTIVE STATEMENT
Pt presents to ED c/o bilateral hand numbness x1 month. Denies trauma. PM Hx autoimmune necrotizing myopathy, HTN. AOx3, alert and calm. Airway patent RR even unlabored.

## 2025-03-25 NOTE — ED PROVIDER NOTE - OBJECTIVE STATEMENT
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 Numbness started in right finer tips and has progressed to bilateral fingers. Patient reports difficulty buttoning his shirts. Denies muscle weakness. Denies chest pain. Denies shortness of breath. Denies rash. Endorses experiencing red itchy rash after initially starting azathioprine but declines rash at this present time.    Recently aza dose decreased 2mg to 1mg.    Denies joint pains, swelling, redness, dysphagia. Endorses good PO intake. Denies diarrhea or changes in stool.
no

## 2025-03-25 NOTE — ED PROVIDER NOTE - TOBACCO USE
Patient to recheck TSH level in 4 weeks, can you please call to schedule? Thank you! Lab ordered.    Never smoker

## 2025-03-25 NOTE — ED CDU PROVIDER INITIAL DAY NOTE - ATTENDING APP SHARED VISIT CONTRIBUTION OF CARE
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 Symptoms have progressed over past month. Labs with mild hyponatremia, likely noncontributory, other electrolytes wnl. Pt placed in obs for MRI head and c-spine. ED doctor discussed case with Dr. Pedro - Less likely related to myopathy or azathioprine.

## 2025-03-26 VITALS
DIASTOLIC BLOOD PRESSURE: 85 MMHG | TEMPERATURE: 98 F | SYSTOLIC BLOOD PRESSURE: 173 MMHG | HEART RATE: 65 BPM | OXYGEN SATURATION: 95 % | RESPIRATION RATE: 18 BRPM

## 2025-03-26 PROCEDURE — 84443 ASSAY THYROID STIM HORMONE: CPT

## 2025-03-26 PROCEDURE — 72125 CT NECK SPINE W/O DYE: CPT | Mod: MC

## 2025-03-26 PROCEDURE — 70450 CT HEAD/BRAIN W/O DYE: CPT | Mod: MC

## 2025-03-26 PROCEDURE — 70551 MRI BRAIN STEM W/O DYE: CPT | Mod: 26

## 2025-03-26 PROCEDURE — G0378: CPT

## 2025-03-26 PROCEDURE — 70551 MRI BRAIN STEM W/O DYE: CPT | Mod: MC

## 2025-03-26 PROCEDURE — 99239 HOSP IP/OBS DSCHRG MGMT >30: CPT

## 2025-03-26 PROCEDURE — 80048 BASIC METABOLIC PNL TOTAL CA: CPT

## 2025-03-26 PROCEDURE — 82746 ASSAY OF FOLIC ACID SERUM: CPT

## 2025-03-26 PROCEDURE — 99284 EMERGENCY DEPT VISIT MOD MDM: CPT | Mod: 25

## 2025-03-26 PROCEDURE — 82607 VITAMIN B-12: CPT

## 2025-03-26 PROCEDURE — 83735 ASSAY OF MAGNESIUM: CPT

## 2025-03-26 PROCEDURE — 36415 COLL VENOUS BLD VENIPUNCTURE: CPT

## 2025-03-26 PROCEDURE — 72141 MRI NECK SPINE W/O DYE: CPT | Mod: MC

## 2025-03-26 PROCEDURE — 84100 ASSAY OF PHOSPHORUS: CPT

## 2025-03-26 PROCEDURE — 80076 HEPATIC FUNCTION PANEL: CPT

## 2025-03-26 PROCEDURE — 72141 MRI NECK SPINE W/O DYE: CPT | Mod: 26

## 2025-03-26 PROCEDURE — 85027 COMPLETE CBC AUTOMATED: CPT

## 2025-03-26 PROCEDURE — 82550 ASSAY OF CK (CPK): CPT

## 2025-03-26 RX ADMIN — LISINOPRIL 20 MILLIGRAM(S): 5 TABLET ORAL at 06:18

## 2025-03-26 NOTE — ED ADULT NURSE REASSESSMENT NOTE - NS ED NURSE REASSESS COMMENT FT1
Pt A&Ox4 resting comfortably, shows no s/s of distress RR even and unlabored. Pt updated on plan of care.

## 2025-03-26 NOTE — ED CDU PROVIDER DISPOSITION NOTE - CARE PROVIDER_API CALL
Piyush George  Neurology  [address], [address]  [place] [address]  Phone: [phone]  Fax: [phone]  Follow Up Time: Urgent

## 2025-03-26 NOTE — ED CDU PROVIDER DISPOSITION NOTE - NSFOLLOWUPINSTRUCTIONS_ED_ALL_ED_FT
please follow with outpatient primary medical clinician as well as outpatient rheumatologist and referral for neurologist   please continue all daily prescribed medications   new or worsening condition seek re-evaluation

## 2025-03-26 NOTE — ED CDU PROVIDER SUBSEQUENT DAY NOTE - CLINICAL SUMMARY MEDICAL DECISION MAKING FREE TEXT BOX
85 yo male PMHx autoimmune necrotizing myopathy, HTN, HLD placed in OBS for MRI after initially presenting to ED c/o b/l finger numbness x3 weeks. CT head/neck negative. MRI completed, pending results.

## 2025-03-26 NOTE — ED CDU PROVIDER DISPOSITION NOTE - ATTENDING CONTRIBUTION TO CARE
obs for MRI  feeling better   MRI neg for any acute finding pt would like to go home  neurologically intact no focal findings  will dc with neurology follow up notes already sees a rheumatologist for his condition

## 2025-03-26 NOTE — ED ADULT NURSE REASSESSMENT NOTE - NS ED NURSE REASSESS COMMENT FT1
Pt A&Ox4 resting comfortably, shows no s/s of distress RR even and unlabored. Pt updated on plan of care-taken to MRI.

## 2025-03-26 NOTE — ED CDU PROVIDER DISPOSITION NOTE - PATIENT PORTAL LINK FT
You can access the FollowMyHealth Patient Portal offered by [facility] by registering at the following website: [email or web address]. By joining [employer]’s FollowMyHealth portal, you will also be able to view your health information using other applications (apps) compatible with our system.

## 2025-03-26 NOTE — ED CDU PROVIDER DISPOSITION NOTE - CLINICAL COURSE
85 yo male htn hld autoimmune necrotizing myopathy presented to the ED with bilateral finger numbness for the last 3 weeks. on azathioprine with recent decrease of dosage. placed in observation following initial ed evaluation and underwent MR head and cervical spine of which results have been reviewed with patient, advised on outpatient fu with neurology referral as well as continued fu with established rheumatologist and pcp. return precautions advised and pt verbalized understanding

## 2025-04-01 ENCOUNTER — APPOINTMENT (OUTPATIENT)
Dept: NEUROLOGY | Facility: CLINIC | Age: 85
End: 2025-04-01
Payer: MEDICARE

## 2025-04-01 VITALS
DIASTOLIC BLOOD PRESSURE: 79 MMHG | HEIGHT: 68 IN | SYSTOLIC BLOOD PRESSURE: 143 MMHG | BODY MASS INDEX: 25.76 KG/M2 | WEIGHT: 170 LBS

## 2025-04-01 DIAGNOSIS — R20.2 PARESTHESIA OF SKIN: ICD-10-CM

## 2025-04-01 DIAGNOSIS — G62.9 POLYNEUROPATHY, UNSPECIFIED: ICD-10-CM

## 2025-04-01 PROCEDURE — 99214 OFFICE O/P EST MOD 30 MIN: CPT

## 2025-05-06 ENCOUNTER — APPOINTMENT (OUTPATIENT)
Dept: NEUROLOGY | Facility: CLINIC | Age: 85
End: 2025-05-06
Payer: MEDICARE

## 2025-05-06 DIAGNOSIS — G56.03 CARPAL TUNNEL SYNDROM,BILATERAL UPPER LIMBS: ICD-10-CM

## 2025-05-06 PROCEDURE — 95910 NRV CNDJ TEST 7-8 STUDIES: CPT

## 2025-05-06 PROCEDURE — 95886 MUSC TEST DONE W/N TEST COMP: CPT

## 2025-05-21 ENCOUNTER — APPOINTMENT (OUTPATIENT)
Dept: RHEUMATOLOGY | Facility: CLINIC | Age: 85
End: 2025-05-21

## 2025-06-25 ENCOUNTER — APPOINTMENT (OUTPATIENT)
Dept: NEUROLOGY | Facility: CLINIC | Age: 85
End: 2025-06-25

## 2025-08-28 ENCOUNTER — APPOINTMENT (OUTPATIENT)
Dept: INTERNAL MEDICINE | Facility: CLINIC | Age: 85
End: 2025-08-28

## 2025-08-28 VITALS
DIASTOLIC BLOOD PRESSURE: 64 MMHG | BODY MASS INDEX: 25.76 KG/M2 | SYSTOLIC BLOOD PRESSURE: 123 MMHG | HEART RATE: 68 BPM | RESPIRATION RATE: 13 BRPM | HEIGHT: 68 IN | WEIGHT: 170 LBS

## 2025-08-28 DIAGNOSIS — Z23 ENCOUNTER FOR IMMUNIZATION: ICD-10-CM

## 2025-08-28 DIAGNOSIS — T46.6X5A DRUG-INDUCED MYOPATHY: ICD-10-CM

## 2025-08-28 DIAGNOSIS — M33.20 POLYMYOSITIS, ORGAN INVOLVEMENT UNSPECIFIED: ICD-10-CM

## 2025-08-28 DIAGNOSIS — G72.0 DRUG-INDUCED MYOPATHY: ICD-10-CM

## 2025-08-28 DIAGNOSIS — I10 ESSENTIAL (PRIMARY) HYPERTENSION: ICD-10-CM

## 2025-08-28 DIAGNOSIS — R20.2 PARESTHESIA OF SKIN: ICD-10-CM

## 2025-08-28 LAB
ALBUMIN SERPL ELPH-MCNC: 4.4 G/DL
ALP BLD-CCNC: 133 U/L
ALT SERPL-CCNC: 18 U/L
ANION GAP SERPL CALC-SCNC: 13 MMOL/L
AST SERPL-CCNC: 24 U/L
BILIRUB SERPL-MCNC: 0.5 MG/DL
BUN SERPL-MCNC: 13 MG/DL
CALCIUM SERPL-MCNC: 9.5 MG/DL
CHLORIDE SERPL-SCNC: 102 MMOL/L
CHOLEST SERPL-MCNC: 189 MG/DL
CK SERPL-CCNC: 114 U/L
CO2 SERPL-SCNC: 25 MMOL/L
CREAT SERPL-MCNC: 0.69 MG/DL
EGFRCR SERPLBLD CKD-EPI 2021: 92 ML/MIN/1.73M2
GLUCOSE SERPL-MCNC: 82 MG/DL
HDLC SERPL-MCNC: 51 MG/DL
LDLC SERPL-MCNC: 106 MG/DL
NONHDLC SERPL-MCNC: 138 MG/DL
POTASSIUM SERPL-SCNC: 4.6 MMOL/L
PROT SERPL-MCNC: 7 G/DL
SODIUM SERPL-SCNC: 139 MMOL/L
TRIGL SERPL-MCNC: 186 MG/DL

## 2025-08-28 PROCEDURE — G0008: CPT

## 2025-08-28 PROCEDURE — 90662 IIV NO PRSV INCREASED AG IM: CPT

## 2025-08-28 PROCEDURE — G2211 COMPLEX E/M VISIT ADD ON: CPT

## 2025-08-28 PROCEDURE — 99214 OFFICE O/P EST MOD 30 MIN: CPT

## 2025-08-28 PROCEDURE — 36415 COLL VENOUS BLD VENIPUNCTURE: CPT

## 2025-08-29 LAB
BASOPHILS # BLD AUTO: 0.11 K/UL
BASOPHILS NFR BLD AUTO: 1.4 %
EOSINOPHIL # BLD AUTO: 0.39 K/UL
EOSINOPHIL NFR BLD AUTO: 5 %
ERYTHROCYTE [SEDIMENTATION RATE] IN BLOOD BY WESTERGREN METHOD: 29 MM/HR
ESTIMATED AVERAGE GLUCOSE: 120 MG/DL
HBA1C MFR BLD HPLC: 5.8 %
HCT VFR BLD CALC: 40.3 %
HGB BLD-MCNC: 13.1 G/DL
IMM GRANULOCYTES NFR BLD AUTO: 0.3 %
LYMPHOCYTES # BLD AUTO: 2.13 K/UL
LYMPHOCYTES NFR BLD AUTO: 27.3 %
MAN DIFF?: NORMAL
MCHC RBC-ENTMCNC: 32.5 G/DL
MCHC RBC-ENTMCNC: 32.5 PG
MCV RBC AUTO: 100 FL
MONOCYTES # BLD AUTO: 1.06 K/UL
MONOCYTES NFR BLD AUTO: 13.6 %
NEUTROPHILS # BLD AUTO: 4.08 K/UL
NEUTROPHILS NFR BLD AUTO: 52.4 %
PLATELET # BLD AUTO: 299 K/UL
RBC # BLD: 4.03 M/UL
RBC # FLD: 13.2 %
WBC # FLD AUTO: 7.79 K/UL

## (undated) DEVICE — VENODYNE/SCD SLEEVE CALF MEDIUM

## (undated) DEVICE — SYR CONTROL LUER LOK 10CC

## (undated) DEVICE — ELCTR GROUNDING PAD ADULT COVIDIEN

## (undated) DEVICE — SOL IRR POUR NS 0.9% 1000ML

## (undated) DEVICE — PACK MINOR WITH LAP

## (undated) DEVICE — SUT MONOCRYL 4-0 27" PS-2 UNDYED

## (undated) DEVICE — GLV 7.5 PROTEXIS (WHITE)

## (undated) DEVICE — MARKING PEN W RULER / LABELS

## (undated) DEVICE — DRSG DERMABOND 0.7ML

## (undated) DEVICE — SOL IRR POUR H2O 1000ML

## (undated) DEVICE — ELCTR ROCKER SWITCH PENCIL BLUE 10FT